# Patient Record
Sex: MALE | Race: WHITE | Employment: OTHER | URBAN - METROPOLITAN AREA
[De-identification: names, ages, dates, MRNs, and addresses within clinical notes are randomized per-mention and may not be internally consistent; named-entity substitution may affect disease eponyms.]

---

## 2017-06-15 ENCOUNTER — HOSPITAL ENCOUNTER (OUTPATIENT)
Dept: LAB | Age: 72
Discharge: HOME | End: 2017-06-15

## 2017-06-15 PROCEDURE — 88305 TISSUE EXAM BY PATHOLOGIST: CPT | Performed by: INTERNAL MEDICINE

## 2017-09-07 ENCOUNTER — ANESTHESIA EVENT (OUTPATIENT)
Dept: ENDOSCOPY | Age: 72
End: 2017-09-07
Payer: MEDICARE

## 2017-09-07 RX ORDER — SODIUM CHLORIDE 0.9 % (FLUSH) 0.9 %
5-10 SYRINGE (ML) INJECTION AS NEEDED
Status: CANCELLED | OUTPATIENT
Start: 2017-09-07

## 2017-09-07 RX ORDER — OXYCODONE HYDROCHLORIDE 5 MG/1
10 TABLET ORAL
Status: CANCELLED | OUTPATIENT
Start: 2017-09-07

## 2017-09-07 RX ORDER — DIPHENHYDRAMINE HYDROCHLORIDE 50 MG/ML
12.5 INJECTION, SOLUTION INTRAMUSCULAR; INTRAVENOUS ONCE
Status: CANCELLED | OUTPATIENT
Start: 2017-09-07 | End: 2017-09-07

## 2017-09-07 RX ORDER — ACETAMINOPHEN 500 MG
500 TABLET ORAL ONCE
Status: CANCELLED | OUTPATIENT
Start: 2017-09-07 | End: 2017-09-07

## 2017-09-07 RX ORDER — SODIUM CHLORIDE, SODIUM LACTATE, POTASSIUM CHLORIDE, CALCIUM CHLORIDE 600; 310; 30; 20 MG/100ML; MG/100ML; MG/100ML; MG/100ML
75 INJECTION, SOLUTION INTRAVENOUS CONTINUOUS
Status: CANCELLED | OUTPATIENT
Start: 2017-09-07

## 2017-09-07 RX ORDER — NALOXONE HYDROCHLORIDE 0.4 MG/ML
0.1 INJECTION, SOLUTION INTRAMUSCULAR; INTRAVENOUS; SUBCUTANEOUS AS NEEDED
Status: CANCELLED | OUTPATIENT
Start: 2017-09-07 | End: 2017-09-07

## 2017-09-07 RX ORDER — OXYCODONE HYDROCHLORIDE 5 MG/1
5 TABLET ORAL
Status: CANCELLED | OUTPATIENT
Start: 2017-09-07

## 2017-09-07 RX ORDER — HYDROMORPHONE HYDROCHLORIDE 2 MG/ML
0.5 INJECTION, SOLUTION INTRAMUSCULAR; INTRAVENOUS; SUBCUTANEOUS
Status: CANCELLED | OUTPATIENT
Start: 2017-09-07

## 2017-09-07 RX ORDER — ONDANSETRON 2 MG/ML
4 INJECTION INTRAMUSCULAR; INTRAVENOUS ONCE
Status: CANCELLED | OUTPATIENT
Start: 2017-09-07 | End: 2017-09-07

## 2017-09-08 ENCOUNTER — HOSPITAL ENCOUNTER (OUTPATIENT)
Age: 72
Setting detail: OUTPATIENT SURGERY
Discharge: HOME | End: 2017-09-08
Attending: INTERNAL MEDICINE | Admitting: INTERNAL MEDICINE
Payer: MEDICARE

## 2017-09-08 ENCOUNTER — ANESTHESIA (OUTPATIENT)
Dept: ENDOSCOPY | Age: 72
End: 2017-09-08
Payer: MEDICARE

## 2017-09-08 VITALS
OXYGEN SATURATION: 98 % | WEIGHT: 242 LBS | RESPIRATION RATE: 16 BRPM | DIASTOLIC BLOOD PRESSURE: 79 MMHG | HEIGHT: 67 IN | BODY MASS INDEX: 37.98 KG/M2 | TEMPERATURE: 98.6 F | SYSTOLIC BLOOD PRESSURE: 165 MMHG | HEART RATE: 65 BPM

## 2017-09-08 LAB — GLUCOSE BLD STRIP.AUTO-MCNC: 112 MG/DL (ref 65–100)

## 2017-09-08 PROCEDURE — 74011250636 HC RX REV CODE- 250/636: Performed by: ANESTHESIOLOGY

## 2017-09-08 PROCEDURE — 77030011640 HC PAD GRND REM COVD -A: Performed by: INTERNAL MEDICINE

## 2017-09-08 PROCEDURE — 74011250636 HC RX REV CODE- 250/636

## 2017-09-08 PROCEDURE — 77030029929: Performed by: INTERNAL MEDICINE

## 2017-09-08 PROCEDURE — 82962 GLUCOSE BLOOD TEST: CPT

## 2017-09-08 PROCEDURE — 88305 TISSUE EXAM BY PATHOLOGIST: CPT | Performed by: INTERNAL MEDICINE

## 2017-09-08 PROCEDURE — 77030014179 HC APPL CLP RESOL BSC -C: Performed by: INTERNAL MEDICINE

## 2017-09-08 PROCEDURE — 74011000250 HC RX REV CODE- 250

## 2017-09-08 PROCEDURE — 76040000026: Performed by: INTERNAL MEDICINE

## 2017-09-08 PROCEDURE — 76060000032 HC ANESTHESIA 0.5 TO 1 HR: Performed by: INTERNAL MEDICINE

## 2017-09-08 PROCEDURE — 77030009426 HC FCPS BIOP ENDOSC BSC -B: Performed by: INTERNAL MEDICINE

## 2017-09-08 PROCEDURE — 77030013991 HC SNR POLYP ENDOSC BSC -A: Performed by: INTERNAL MEDICINE

## 2017-09-08 RX ORDER — LIDOCAINE HYDROCHLORIDE 20 MG/ML
INJECTION, SOLUTION EPIDURAL; INFILTRATION; INTRACAUDAL; PERINEURAL AS NEEDED
Status: DISCONTINUED | OUTPATIENT
Start: 2017-09-08 | End: 2017-09-08 | Stop reason: HOSPADM

## 2017-09-08 RX ORDER — SODIUM CHLORIDE, SODIUM LACTATE, POTASSIUM CHLORIDE, CALCIUM CHLORIDE 600; 310; 30; 20 MG/100ML; MG/100ML; MG/100ML; MG/100ML
1000 INJECTION, SOLUTION INTRAVENOUS CONTINUOUS
Status: DISCONTINUED | OUTPATIENT
Start: 2017-09-08 | End: 2017-09-08 | Stop reason: HOSPADM

## 2017-09-08 RX ORDER — SODIUM CHLORIDE 0.9 % (FLUSH) 0.9 %
5-10 SYRINGE (ML) INJECTION AS NEEDED
Status: DISCONTINUED | OUTPATIENT
Start: 2017-09-08 | End: 2017-09-08 | Stop reason: HOSPADM

## 2017-09-08 RX ORDER — MIDAZOLAM HYDROCHLORIDE 1 MG/ML
2 INJECTION, SOLUTION INTRAMUSCULAR; INTRAVENOUS
Status: DISCONTINUED | OUTPATIENT
Start: 2017-09-08 | End: 2017-09-08 | Stop reason: HOSPADM

## 2017-09-08 RX ORDER — PROPOFOL 10 MG/ML
INJECTION, EMULSION INTRAVENOUS AS NEEDED
Status: DISCONTINUED | OUTPATIENT
Start: 2017-09-08 | End: 2017-09-08 | Stop reason: HOSPADM

## 2017-09-08 RX ORDER — SODIUM CHLORIDE 0.9 % (FLUSH) 0.9 %
5-10 SYRINGE (ML) INJECTION EVERY 8 HOURS
Status: DISCONTINUED | OUTPATIENT
Start: 2017-09-08 | End: 2017-09-08 | Stop reason: HOSPADM

## 2017-09-08 RX ORDER — PROPOFOL 10 MG/ML
INJECTION, EMULSION INTRAVENOUS
Status: DISCONTINUED | OUTPATIENT
Start: 2017-09-08 | End: 2017-09-08 | Stop reason: HOSPADM

## 2017-09-08 RX ORDER — FENTANYL CITRATE 50 UG/ML
100 INJECTION, SOLUTION INTRAMUSCULAR; INTRAVENOUS AS NEEDED
Status: DISCONTINUED | OUTPATIENT
Start: 2017-09-08 | End: 2017-09-08 | Stop reason: HOSPADM

## 2017-09-08 RX ORDER — LIDOCAINE HYDROCHLORIDE 10 MG/ML
0.1 INJECTION INFILTRATION; PERINEURAL AS NEEDED
Status: DISCONTINUED | OUTPATIENT
Start: 2017-09-08 | End: 2017-09-08 | Stop reason: HOSPADM

## 2017-09-08 RX ORDER — DIPHENHYDRAMINE HCL 25 MG
25 CAPSULE ORAL
COMMUNITY
End: 2018-01-03

## 2017-09-08 RX ADMIN — PROPOFOL 30 MG: 10 INJECTION, EMULSION INTRAVENOUS at 08:35

## 2017-09-08 RX ADMIN — LIDOCAINE HYDROCHLORIDE 60 MG: 20 INJECTION, SOLUTION EPIDURAL; INFILTRATION; INTRACAUDAL; PERINEURAL at 08:35

## 2017-09-08 RX ADMIN — PROPOFOL 180 MCG/KG/MIN: 10 INJECTION, EMULSION INTRAVENOUS at 08:35

## 2017-09-08 RX ADMIN — SODIUM CHLORIDE, SODIUM LACTATE, POTASSIUM CHLORIDE, AND CALCIUM CHLORIDE 1000 ML: 600; 310; 30; 20 INJECTION, SOLUTION INTRAVENOUS at 08:02

## 2017-09-08 NOTE — ANESTHESIA PREPROCEDURE EVALUATION
Anesthetic History   No history of anesthetic complications            Review of Systems / Medical History  Patient summary reviewed and pertinent labs reviewed    Pulmonary  Within defined limits                 Neuro/Psych       CVA: no residual symptoms       Cardiovascular    Hypertension: well controlled          CAD and cardiac stents (2003)    Exercise tolerance: >4 METS  Comments: Denies cardiac problems but endorses ECKERT   GI/Hepatic/Renal     GERD: well controlled           Endo/Other    Diabetes         Other Findings   Comments: Dysphagia           Physical Exam    Airway  Mallampati: III  TM Distance: 4 - 6 cm  Neck ROM: normal range of motion   Mouth opening: Normal     Cardiovascular    Rhythm: regular  Rate: normal         Dental  No notable dental hx       Pulmonary  Breath sounds clear to auscultation               Abdominal  GI exam deferred       Other Findings            Anesthetic Plan    ASA: 3  Anesthesia type: total IV anesthesia          Induction: Intravenous  Anesthetic plan and risks discussed with: Patient

## 2017-09-08 NOTE — IP AVS SNAPSHOT
[employer] 
 
 
 [address] [facility] [address] [facility] 
[phone] Patient: Kadeem Daley MRN: [id number] :1945 You are allergic to the following Allergen Reactions Pcn (Penicillins) Rash Recent Documentation Height Weight BMI Smoking Status 1.702 m 109.8 kg 37.9 kg/m2 Former Smoker Emergency Contacts Name Discharge Info Relation Home Work Mobile Frantz Yi  Son [22]   [phone] About your hospitalization You were admitted on:  2017 You last received care in the:  [facility] You were discharged on:  2017 Unit phone number:  [phone] Why you were hospitalized Your primary diagnosis was:  Not on File Providers Seen During Your Hospitalizations Provider Role Specialty Primary office phone Lei Finn MD Attending Provider Gastroenterology [phone] Your Primary Care Physician (PCP) Primary Care Physician Office Phone Office Fax [facility] [phone] Follow-up Information None Current Discharge Medication List  
  
ASK your doctor about these medications Dose & Instructions Dispensing Information Comments Morning Noon Evening Bedtime  
 allopurinol 100 mg tablet Commonly known as:  Erin Manzano Your last dose was: Your next dose is:    
   
   
 Dose:  100 mg Take 1 Tab by mouth daily. Quantity:  90 Tab Refills:  0  
     
   
   
   
  
 aspirin delayed-release 81 mg tablet Your last dose was: Your next dose is: Take  by mouth daily. Refills:  0  
     
   
   
   
  
 BENADRYL 25 mg capsule Generic drug:  diphenhydrAMINE Your last dose was: Your next dose is:    
   
   
 Dose:  25 mg Take 25 mg by mouth every six (6) hours as needed. Refills:  0 Blood-Glucose Meter Misc Commonly known as:  [address] Your last dose was: Your next dose is:    
   
   
 Ck fbs q day adn  A 2 hr prn bs after lunch or supper  Indications: sample given Quantity:  1 Each Refills:  0  
     
   
   
   
  
 colchicine 0.6 mg tablet Commonly known as:  COLCRYS Your last dose was: Your next dose is:    
   
   
 Dose:  0.6 mg Take 1 Tab by mouth daily. Indications: GOUT PREVENTION Quantity:  30 Tab Refills:  1  
     
   
   
   
  
 glucose blood VI test strips strip Commonly known as:  Fredderick Cover Your last dose was: Your next dose is:    
   
   
 Check fbs and  2 hr pc bs q  Day Quantity:  100 Strip Refills:  5  
     
   
   
   
  
 hydroCHLOROthiazide 25 mg tablet Commonly known as:  HYDRODIURIL Your last dose was: Your next dose is: Take 1 Tab by mouth daily. Quantity:  90 Tab Refills:  1  
     
   
   
   
  
 ibuprofen 200 mg tablet Commonly known as:  MOTRIN Your last dose was: Your next dose is:    
   
   
 Dose:  800 mg Take 800 mg by mouth nightly. Refills:  0  
     
   
   
   
  
 lancets 30 gauge Misc Your last dose was: Your next dose is:    
   
   
 Ck fbs and  2 hr pc bs q day and log it down daily Quantity:  100 Package Refills:  5  
     
   
   
   
  
 metFORMIN 500 mg tablet Commonly known as:  GLUCOPHAGE Your last dose was: Your next dose is:    
   
   
 Dose:  500 mg Take 1 Tab by mouth two (2) times daily (with meals). Quantity:  180 Tab Refills:  1  
     
   
   
   
  
 metoprolol succinate 100 mg tablet Commonly known as:  TOPROL-XL Your last dose was: Your next dose is: Take 1 Tab by mouth daily. Quantity:  90 Tab Refills:  1  
     
   
   
   
  
 nitroglycerin 0.4 mg SL tablet Commonly known as:  NITROSTAT Your last dose was: Your next dose is:    
   
   
 Dose:  0.4 mg  
1 Tab by SubLINGual route every five (5) minutes as needed for Chest Pain. Quantity:  1 Bottle Refills:  1  
     
   
   
   
  
 omeprazole 20 mg capsule Commonly known as:  PRILOSEC Your last dose was: Your next dose is: Take 1 Cap by mouth two (2) times a day. Quantity:  180 Cap Refills:  1  
     
   
   
   
  
 predniSONE 20 mg tablet Commonly known as:  Darene Reels Your last dose was: Your next dose is:    
   
   
 2 tabs today, then 1 tablet daily x 1 week Quantity:  9 Tab Refills:  0  
     
   
   
   
  
 simvastatin 40 mg tablet Commonly known as:  ZOCOR Your last dose was: Your next dose is:    
   
   
 Dose:  40 mg Take 1 Tab by mouth daily. Quantity:  90 Tab Refills:  1  
     
   
   
   
  
 spironolactone 25 mg tablet Commonly known as:  ALDACTONE Your last dose was: Your next dose is: Take 1 Tab by mouth daily. Quantity:  90 Tab Refills:  1  
     
   
   
   
  
 zolpidem 10 mg tablet Commonly known as:  AMBIEN Your last dose was: Your next dose is: TAKE 1 TABLET NIGHTLY AS NEEDED FOR SLEEP. MAX 1 TAB DAILY. Quantity:  30 Tab Refills:  5 Discharge Instructions Gastrointestinal Colonoscopy/Flexible Sigmoidoscopy - Lower Exam Discharge Instructions 1. Call Dr. Raya Hoyt at [phone] for any problems or questions. 2. Contact the doctors office for follow up appointment as directed 3. Medication may cause drowsiness for several hours, therefore, do not drive or operate machinery for remainder of the day. 4. No alcohol today. 5. Ordinarily, you may resume regular diet and activity after exam unless otherwise specified by your physician.  
6. Because of air put into your colon during exam, you may experience some abdominal distension, relieved by the passage of gas, for several hours. 7. Contact your physician if you have any of the following: 
a. Excessive amount of bleeding  large amount when having a bowel movement. Occasional specks of blood with bowel movement would not be unusual. 
b. Severe abdominal pain 
c. Fever or Chills 8. Polyp Removal  follow these additional instructions 
a. Resume regular diet  
b. Take Metamucil  1 tablespoon in juice every morning for 3 days 
c. No Aspirin, Advil, Aleve, Nuprin, Ibuprofen, or medications that contain these drugs for 2 weeks. Any additional instructions:  
 
- Post polypectomy instructions - Colonoscopy in 9months Instructions given to Palmer Cool and other family members. Instructions given by:  Gillian Osborne RN Discharge Orders None Introducing [facility] SERVICES! Janae Genao introduces Cree patient portal. Now you can access parts of your medical record, email your doctor's office, and request medication refills online. 1. In your internet browser, go to [email or web address] [employer]/[facility] 2. Click on the First Time User? Click Here link in the Sign In box. You will see the New Member Sign Up page. 3. Enter your Cree Access Code exactly as it appears below. You will not need to use this code after youve completed the sign-up process. If you do not sign up before the expiration date, you must request a new code. · Cree Access Code: [email or web address] Expires: 12/7/2017  7:04 AM 
 
4. Enter the last four digits of your Social Security Number (xxxx) and Date of Birth (mm/dd/yyyy) as indicated and click Submit. You will be taken to the next sign-up page. 5. Create a [employer] ID. This will be your Cree login ID and cannot be changed, so think of one that is secure and easy to remember. 6. Create a [employer] password. You can change your password at any time. 7. Enter your Password Reset Question and Answer. This can be used at a later time if you forget your password. 8. Enter your e-mail address. You will receive e-mail notification when new information is available in [address] 9. Click Sign Up. You can now view and download portions of your medical record. 10. Click the Download Summary menu link to download a portable copy of your medical information. If you have questions, please visit the Frequently Asked Questions section of the [employer] website. Remember, [employer] is NOT to be used for urgent needs. For medical emergencies, dial 911. Now available from your iPhone and Android! General Information Please provide this summary of care documentation to your next provider. Patient Signature:  ____________________________________________________________ Date:  ____________________________________________________________  
  
Marifer Gould Provider Signature:  ____________________________________________________________ Date:  ____________________________________________________________

## 2017-09-08 NOTE — ANESTHESIA POSTPROCEDURE EVALUATION
Post-Anesthesia Evaluation and Assessment    Patient: Kinza Zamudio MRN: [id number]  SSN: [id number]    YOB: 1945  Age: 73 y.o. Sex: male       Cardiovascular Function/Vital Signs  Visit Vitals    /85    Pulse 63    Temp 37 °C (98.6 °F)    Resp 16    Ht 5' 7\" (1.702 m)    Wt 109.8 kg (242 lb)    SpO2 98%    BMI 37.9 kg/m2       Patient is status post total IV anesthesia anesthesia for Procedure(s):  COLONOSCOPY WITH EMR/ 34  INJECTION  ENDOSCOPIC MUCOSAL RESECTION  ENDOSCOPIC POLYPECTOMY  RESOLUTION CLIP. Nausea/Vomiting: None    Postoperative hydration reviewed and adequate. Pain:  Pain Scale 1: Numeric (0 - 10) (09/08/17 0933)  Pain Intensity 1: 0 (09/08/17 0933)   Managed    Neurological Status: At baseline    Mental Status and Level of Consciousness: Arousable    Pulmonary Status:   O2 Device: Room air (09/08/17 0933)   Adequate oxygenation and airway patent    Complications related to anesthesia: None    Post-anesthesia assessment completed.  No concerns    Signed By: Jayde March MD     September 8, 2017

## 2017-09-08 NOTE — H&P
History and Physical      Patient: Goldie Skinner    Physician: Kun Lopes MD    Referring Physician: Felipa Aguirre MD    Chief Complaint: For colonoscopy    History of Present Illness: Pt presents for colonoscopy. Hx of HF polyp partially resected. History:  Past Medical History:   Diagnosis Date    Anxiety     r/t difficulty swallowing    CAD (coronary artery disease)     stent placed 2003    [facility]    type 2; avg blood sugar-100-120    Dysphasia     Esophageal dilatation     x 2 times, last in 2-12     GERD (gastroesophageal reflux disease)     controlled with medication    Hypercholesteremia     Hypertension     medication    Kidney stones     hx    Stroke ([facility]     2003-TIA    Unspecified adverse effect of anesthesia      Past Surgical History:   Procedure Laterality Date    HX OTHER SURGICAL      rectal polyps removed    HX OTHER SURGICAL      esophageal dilation with biopsy      Social History     Social History    Marital status:      Spouse name: N/A    Number of children: N/A    Years of education: N/A     Social History Main Topics    Smoking status: Former Smoker     Packs/day: 2.00     Years: 5.00     Types: Cigarettes     Quit date: 1/1/1980    Smokeless tobacco: Never Used    Alcohol use No    Drug use: No    Sexual activity: Not Currently     Other Topics Concern    None     Social History Narrative      Family History   Problem Relation Age of Onset    Heart Disease Mother     Heart Disease Father     Stroke Father     Cancer Father        Medications:   Prior to Admission medications    Medication Sig Start Date End Date Taking? Authorizing Provider   diphenhydrAMINE (BENADRYL) 25 mg capsule Take 25 mg by mouth every six (6) hours as needed. Yes Historical Provider   zolpidem (AMBIEN) 10 mg tablet TAKE 1 TABLET NIGHTLY AS NEEDED FOR SLEEP. MAX 1 TAB DAILY.  7/18/17  Yes Felipa Aguirre MD   metoprolol succinate (TOPROL-XL) 100 mg tablet Take 1 Tab by mouth daily. 7/18/17  Yes Chadd Barriga MD   spironolactone (ALDACTONE) 25 mg tablet Take 1 Tab by mouth daily. 7/18/17  Yes Chadd Barriga MD   hydroCHLOROthiazide (HYDRODIURIL) 25 mg tablet Take 1 Tab by mouth daily. 7/18/17  Yes Chadd Barriga MD   omeprazole (PRILOSEC) 20 mg capsule Take 1 Cap by mouth two (2) times a day. 7/18/17  Yes Chadd Barriga MD   allopurinol (ZYLOPRIM) 100 mg tablet Take 1 Tab by mouth daily. 4/28/17  Yes Leonidas Scott PA-C   colchicine (COLCRYS) 0.6 mg tablet Take 1 Tab by mouth daily. Indications: GOUT PREVENTION 4/28/17  Yes Leonidas Scott PA-C   aspirin delayed-release 81 mg tablet Take  by mouth daily. Yes Historical Provider   metFORMIN (GLUCOPHAGE) 500 mg tablet Take 1 Tab by mouth two (2) times daily (with meals). 11/16/16  Yes Chadd Barriga MD   simvastatin (ZOCOR) 40 mg tablet Take 1 Tab by mouth daily. 11/16/16  Yes Chadd Barriga MD   ibuprofen (MOTRIN) 200 mg tablet Take 800 mg by mouth nightly. Yes Historical Provider   glucose blood VI test strips (ONETOUCH VERIO) strip Check fbs and  2 hr pc bs q  Day 2/5/16  Yes Chadd Barriga MD   Blood-Glucose Meter (ONETOUCH VERIO SYSTEM) misc Ck fbs q day adn  A 2 hr prn bs after lunch or supper  Indications: sample given 2/5/16  Yes Chadd Barriga MD   lancets 30 gauge misc Ck fbs and  2 hr pc bs q day and log it down daily 2/5/16  Yes Chadd Barriga MD   predniSONE (DELTASONE) 20 mg tablet 2 tabs today, then 1 tablet daily x 1 week 4/26/17   Leonidas Scott PA-C   nitroglycerin (NITROSTAT) 0.4 mg SL tablet 1 Tab by SubLINGual route every five (5) minutes as needed for Chest Pain. 6/10/16   Chadd Barriga MD       Allergies:    Allergies   Allergen Reactions    Pcn [Penicillins] Rash       Physical Exam:     Vital Signs:   Visit Vitals    /77    Pulse 70    Temp 98.5 °F (36.9 °C)    Resp 18    Ht 5' 7\" (1.702 m)    Wt 109.8 kg (242 lb)    SpO2 96%    BMI 37.9 kg/m2     . General: no distress      Heart: regular   Lungs: unlabored   Abdominal: soft   Neurological: Grossly normal        Findings/Diagnosis: Colon polyp    Plan of Care/Planned Procedure: Colonoscopy, possible polypectomy and EMR. Pt/designee agree to proceed.       Signed:  Glen Zuñiga MD   9/8/2017

## 2017-09-08 NOTE — DISCHARGE INSTRUCTIONS
Gastrointestinal Colonoscopy/Flexible Sigmoidoscopy - Lower Exam Discharge Instructions  1. Call Dr. Jakob Mayberry at [phone] for any problems or questions. 2. Contact the doctors office for follow up appointment as directed  3. Medication may cause drowsiness for several hours, therefore, do not drive or operate machinery for remainder of the day. 4. No alcohol today. 5. Ordinarily, you may resume regular diet and activity after exam unless otherwise specified by your physician. 6. Because of air put into your colon during exam, you may experience some abdominal distension, relieved by the passage of gas, for several hours. 7. Contact your physician if you have any of the following:  a. Excessive amount of bleeding - large amount when having a bowel movement. Occasional specks of blood with bowel movement would not be unusual.  b. Severe abdominal pain  c. Fever or Chills  8. Polyp Removal - follow these additional instructions  a. Resume regular diet   b. Take Metamucil - 1 tablespoon in juice every morning for 3 days  c. No Aspirin, Advil, Aleve, Nuprin, Ibuprofen, or medications that contain these drugs for 2 weeks. Any additional instructions:     - Post polypectomy instructions  - Colonoscopy in 9months    Instructions given to Dudley Doctor and other family members.   Instructions given by:  Rafiq Ramon RN

## 2018-01-03 PROBLEM — F51.01 PRIMARY INSOMNIA: Chronic | Status: ACTIVE | Noted: 2018-01-03

## 2018-01-03 PROBLEM — K21.00 REFLUX ESOPHAGITIS: Chronic | Status: ACTIVE | Noted: 2018-01-03

## 2018-01-03 PROBLEM — I10 ESSENTIAL HYPERTENSION: Chronic | Status: ACTIVE | Noted: 2018-01-03

## 2018-01-03 PROBLEM — M1A.9XX0 CHRONIC GOUT INVOLVING TOE OF LEFT FOOT: Chronic | Status: ACTIVE | Noted: 2018-01-03

## 2019-05-15 PROBLEM — E78.2 MIXED HYPERLIPIDEMIA: Status: ACTIVE | Noted: 2019-05-15

## 2019-05-15 PROBLEM — I25.10 CORONARY ARTERY DISEASE INVOLVING NATIVE CORONARY ARTERY OF NATIVE HEART WITHOUT ANGINA PECTORIS: Status: ACTIVE | Noted: 2019-05-15

## 2019-05-15 PROBLEM — E78.5 HYPERLIPIDEMIA LDL GOAL <70: Status: ACTIVE | Noted: 2019-05-15

## 2019-05-15 PROBLEM — E79.0 HYPERURICEMIA: Status: ACTIVE | Noted: 2019-05-15

## 2019-05-15 PROBLEM — E11.40 CONTROLLED TYPE 2 DIABETES MELLITUS WITH DIABETIC NEUROPATHY, WITHOUT LONG-TERM CURRENT USE OF INSULIN (HCC): Status: ACTIVE | Noted: 2019-05-15

## 2019-06-05 PROBLEM — E04.2 MULTINODULAR GOITER: Status: ACTIVE | Noted: 2019-06-05

## 2019-06-05 PROBLEM — E05.90 HYPERTHYROIDISM: Status: ACTIVE | Noted: 2019-06-05

## 2019-06-05 PROBLEM — R13.19 ESOPHAGEAL DYSPHAGIA: Status: ACTIVE | Noted: 2019-06-05

## 2019-07-23 DIAGNOSIS — E05.90 HYPERTHYROIDISM: ICD-10-CM

## 2019-07-23 DIAGNOSIS — E05.20 TOXIC MULTINODULAR GOITER: Primary | ICD-10-CM

## 2019-07-24 ENCOUNTER — HOSPITAL ENCOUNTER (OUTPATIENT)
Dept: SURGERY | Age: 74
Discharge: HOME | End: 2019-07-24

## 2019-07-24 NOTE — PERIOP NOTES
Patient verified name and . Order for consent found in EHR and matches case posting; patient verifies procedure. total thyroidectomy    Type 2 surgery, PAT phone assessment complete. Orders received. Labs per surgeon: none  Labs per anesthesia protocol: Hgb, K+ and creatine- Pt instructed to come for lab work at entrance B (Monday- Friday 8:30 AM- 4:00 PM) prior to surgery day. Pt voiced an understanding. EKG: Done on 19 at [facility]. EKG tracing, Echo and stress test requested via fax to [facility] to be faxed to [phone]. Last cardiology office note dated 19 placed on chart for anesthesia reference. Last endocrinology office note dated 19 found in EHR if needed for anesthesia reference. Patient answered medical/surgical history questions at their best of ability. All prior to admission medications documented in [facility]. Patient instructed to take the following medications the day of surgery according to anesthesia guidelines with a small sip of water: allopurinol, aspirin, methimazole, metoprolol and prilosec if needed. Hold all vitamins 7 days prior to surgery and NSAIDS 5 days prior to surgery. Prescription meds to hold: ibuprofen. Patient instructed on the following:  Arrive at A Entrance, time of arrival to be called the day before by 1700  NPO after midnight including gum, mints, and ice chips  Responsible adult must drive patient to the hospital, stay during surgery, and patient will need supervision 24 hours after anesthesia  Use antibacterial soap in shower the night before surgery and on the morning of surgery  All piercings must be removed prior to arrival.    Leave all valuables (money and jewelry) at home but bring insurance card and ID on DOS. Do not wear make-up, nail polish, lotions, cologne, perfumes, powders, or oil on skin. Patient teach back successful and patient demonstrates knowledge of instruction.

## 2019-07-25 ENCOUNTER — HOSPITAL ENCOUNTER (OUTPATIENT)
Dept: LAB | Age: 74
Discharge: HOME | End: 2019-07-25
Attending: OTOLARYNGOLOGY
Payer: MEDICARE

## 2019-07-25 LAB
CREAT SERPL-MCNC: 1 MG/DL (ref 0.8–1.5)
HGB BLD-MCNC: 14.8 G/DL (ref 13.6–17.2)
POTASSIUM SERPL-SCNC: 4 MMOL/L (ref 3.5–5.1)

## 2019-07-25 PROCEDURE — 36415 COLL VENOUS BLD VENIPUNCTURE: CPT

## 2019-07-25 PROCEDURE — 82565 ASSAY OF CREATININE: CPT

## 2019-07-25 PROCEDURE — 85018 HEMOGLOBIN: CPT

## 2019-07-25 PROCEDURE — 84132 ASSAY OF SERUM POTASSIUM: CPT

## 2019-07-25 NOTE — PERIOP NOTES
Received faxed EKG (5/13/19) and echo (11/26/18) from [facility]. Placed on chart for reference.

## 2019-07-25 NOTE — PERIOP NOTES
Lab results Hgb, K+ and Creatinine within anesthesia guidelines.     Recent Results (from the past 12 hour(s))   CREATININE    Collection Time: 07/25/19  3:00 PM   Result Value Ref Range    Creatinine 1.00 0.8 - 1.5 MG/DL   HEMOGLOBIN    Collection Time: 07/25/19  3:00 PM   Result Value Ref Range    HGB 14.8 13.6 - 17.2 g/dL   POTASSIUM    Collection Time: 07/25/19  3:00 PM   Result Value Ref Range    Potassium 4.0 3.5 - 5.1 mmol/L

## 2019-07-29 ENCOUNTER — ANESTHESIA EVENT (OUTPATIENT)
Dept: SURGERY | Age: 74
End: 2019-07-29
Payer: MEDICARE

## 2019-07-29 RX ORDER — FENTANYL CITRATE 50 UG/ML
100 INJECTION, SOLUTION INTRAMUSCULAR; INTRAVENOUS ONCE
Status: CANCELLED | OUTPATIENT
Start: 2019-07-29 | End: 2019-07-29

## 2019-07-30 ENCOUNTER — HOSPITAL ENCOUNTER (OUTPATIENT)
Age: 74
Setting detail: OBSERVATION
Discharge: HOME | End: 2019-07-31
Attending: OTOLARYNGOLOGY | Admitting: OTOLARYNGOLOGY
Payer: MEDICARE

## 2019-07-30 ENCOUNTER — ANESTHESIA (OUTPATIENT)
Dept: SURGERY | Age: 74
End: 2019-07-30
Payer: MEDICARE

## 2019-07-30 DIAGNOSIS — E05.90 HYPERTHYROIDISM: ICD-10-CM

## 2019-07-30 DIAGNOSIS — E05.20 TOXIC MULTINODULAR GOITER: ICD-10-CM

## 2019-07-30 LAB
CALCIUM SERPL-MCNC: 9 MG/DL (ref 8.3–10.4)
GLUCOSE BLD STRIP.AUTO-MCNC: 109 MG/DL (ref 65–100)

## 2019-07-30 PROCEDURE — 77030021678 HC GLIDESCP STAT DISP VERT -B: Performed by: ANESTHESIOLOGY

## 2019-07-30 PROCEDURE — 77030040356 HC CORD BPLR FRCP COVD -A: Performed by: OTOLARYNGOLOGY

## 2019-07-30 PROCEDURE — 99218 HC RM OBSERVATION: CPT

## 2019-07-30 PROCEDURE — 77030019908 HC STETH ESOPH SIMS -A: Performed by: ANESTHESIOLOGY

## 2019-07-30 PROCEDURE — 77030031139 HC SUT VCRL2 J&J -A: Performed by: OTOLARYNGOLOGY

## 2019-07-30 PROCEDURE — 77030016570 HC BLNKT BAIR HGGR 3M -B: Performed by: ANESTHESIOLOGY

## 2019-07-30 PROCEDURE — 76010000175 HC OR TIME 4 TO 4.5 HR INTENSV-TIER 1: Performed by: OTOLARYNGOLOGY

## 2019-07-30 PROCEDURE — 74011000250 HC RX REV CODE- 250

## 2019-07-30 PROCEDURE — 74011250636 HC RX REV CODE- 250/636: Performed by: ANESTHESIOLOGY

## 2019-07-30 PROCEDURE — 77030020255 HC SOL INJ LR 1000ML BG

## 2019-07-30 PROCEDURE — 77030002933 HC SUT MCRYL J&J -A: Performed by: OTOLARYNGOLOGY

## 2019-07-30 PROCEDURE — 77030032988 HC TU ET NIM TRIVNTG EMG MEDT -D: Performed by: OTOLARYNGOLOGY

## 2019-07-30 PROCEDURE — 77030012406 HC DRN WND PENRS BARD -A: Performed by: OTOLARYNGOLOGY

## 2019-07-30 PROCEDURE — 76210000006 HC OR PH I REC 0.5 TO 1 HR: Performed by: OTOLARYNGOLOGY

## 2019-07-30 PROCEDURE — 74011250636 HC RX REV CODE- 250/636

## 2019-07-30 PROCEDURE — 77010033678 HC OXYGEN DAILY

## 2019-07-30 PROCEDURE — 77030011267 HC ELECTRD BLD COVD -A: Performed by: OTOLARYNGOLOGY

## 2019-07-30 PROCEDURE — 77030002996 HC SUT SLK J&J -A: Performed by: OTOLARYNGOLOGY

## 2019-07-30 PROCEDURE — 77030018836 HC SOL IRR NACL ICUM -A: Performed by: OTOLARYNGOLOGY

## 2019-07-30 PROCEDURE — 94760 N-INVAS EAR/PLS OXIMETRY 1: CPT

## 2019-07-30 PROCEDURE — 74011250636 HC RX REV CODE- 250/636: Performed by: OTOLARYNGOLOGY

## 2019-07-30 PROCEDURE — 77030019655 HC PRB STIM CRAN MEDT -B: Performed by: OTOLARYNGOLOGY

## 2019-07-30 PROCEDURE — 77030010512 HC APPL CLP LIG J&J -C: Performed by: OTOLARYNGOLOGY

## 2019-07-30 PROCEDURE — 77030032490 HC SLV COMPR SCD KNE COVD -B: Performed by: OTOLARYNGOLOGY

## 2019-07-30 PROCEDURE — 76060000039 HC ANESTHESIA 4 TO 4.5 HR: Performed by: OTOLARYNGOLOGY

## 2019-07-30 PROCEDURE — 82310 ASSAY OF CALCIUM: CPT

## 2019-07-30 PROCEDURE — 88307 TISSUE EXAM BY PATHOLOGIST: CPT

## 2019-07-30 PROCEDURE — 82962 GLUCOSE BLOOD TEST: CPT

## 2019-07-30 PROCEDURE — 74011000250 HC RX REV CODE- 250: Performed by: OTOLARYNGOLOGY

## 2019-07-30 PROCEDURE — 36415 COLL VENOUS BLD VENIPUNCTURE: CPT

## 2019-07-30 PROCEDURE — 74011250637 HC RX REV CODE- 250/637: Performed by: ANESTHESIOLOGY

## 2019-07-30 PROCEDURE — 74011250637 HC RX REV CODE- 250/637: Performed by: OTOLARYNGOLOGY

## 2019-07-30 RX ORDER — HYDROMORPHONE HYDROCHLORIDE 2 MG/ML
INJECTION, SOLUTION INTRAMUSCULAR; INTRAVENOUS; SUBCUTANEOUS AS NEEDED
Status: DISCONTINUED | OUTPATIENT
Start: 2019-07-30 | End: 2019-07-30 | Stop reason: HOSPADM

## 2019-07-30 RX ORDER — EPINEPHRINE 1 MG/ML
INJECTION INTRAMUSCULAR; INTRAVENOUS; SUBCUTANEOUS AS NEEDED
Status: DISCONTINUED | OUTPATIENT
Start: 2019-07-30 | End: 2019-07-30 | Stop reason: HOSPADM

## 2019-07-30 RX ORDER — NALOXONE HYDROCHLORIDE 0.4 MG/ML
0.4 INJECTION, SOLUTION INTRAMUSCULAR; INTRAVENOUS; SUBCUTANEOUS AS NEEDED
Status: DISCONTINUED | OUTPATIENT
Start: 2019-07-30 | End: 2019-07-31 | Stop reason: HOSPADM

## 2019-07-30 RX ORDER — EPHEDRINE SULFATE 50 MG/ML
INJECTION, SOLUTION INTRAVENOUS AS NEEDED
Status: DISCONTINUED | OUTPATIENT
Start: 2019-07-30 | End: 2019-07-30 | Stop reason: HOSPADM

## 2019-07-30 RX ORDER — ONDANSETRON 2 MG/ML
4 INJECTION INTRAMUSCULAR; INTRAVENOUS
Status: DISCONTINUED | OUTPATIENT
Start: 2019-07-30 | End: 2019-07-31 | Stop reason: HOSPADM

## 2019-07-30 RX ORDER — MIDAZOLAM HYDROCHLORIDE 1 MG/ML
2 INJECTION, SOLUTION INTRAMUSCULAR; INTRAVENOUS
Status: DISCONTINUED | OUTPATIENT
Start: 2019-07-30 | End: 2019-07-30 | Stop reason: HOSPADM

## 2019-07-30 RX ORDER — METFORMIN HYDROCHLORIDE 500 MG/1
500 TABLET ORAL 2 TIMES DAILY WITH MEALS
Status: DISCONTINUED | OUTPATIENT
Start: 2019-07-31 | End: 2019-07-31 | Stop reason: HOSPADM

## 2019-07-30 RX ORDER — PROPOFOL 10 MG/ML
INJECTION, EMULSION INTRAVENOUS AS NEEDED
Status: DISCONTINUED | OUTPATIENT
Start: 2019-07-30 | End: 2019-07-30 | Stop reason: HOSPADM

## 2019-07-30 RX ORDER — HEPARIN SODIUM 5000 [USP'U]/ML
5000 INJECTION, SOLUTION INTRAVENOUS; SUBCUTANEOUS EVERY 8 HOURS
Status: DISCONTINUED | OUTPATIENT
Start: 2019-07-31 | End: 2019-07-31 | Stop reason: HOSPADM

## 2019-07-30 RX ORDER — SODIUM CHLORIDE 0.9 % (FLUSH) 0.9 %
5-40 SYRINGE (ML) INJECTION AS NEEDED
Status: DISCONTINUED | OUTPATIENT
Start: 2019-07-30 | End: 2019-07-31 | Stop reason: HOSPADM

## 2019-07-30 RX ORDER — ACETAMINOPHEN 500 MG
1000 TABLET ORAL
Status: DISCONTINUED | OUTPATIENT
Start: 2019-07-30 | End: 2019-07-30 | Stop reason: HOSPADM

## 2019-07-30 RX ORDER — FAMOTIDINE 20 MG/1
20 TABLET, FILM COATED ORAL ONCE
Status: COMPLETED | OUTPATIENT
Start: 2019-07-30 | End: 2019-07-30

## 2019-07-30 RX ORDER — MORPHINE SULFATE 2 MG/ML
2 INJECTION, SOLUTION INTRAMUSCULAR; INTRAVENOUS
Status: DISCONTINUED | OUTPATIENT
Start: 2019-07-30 | End: 2019-07-31 | Stop reason: HOSPADM

## 2019-07-30 RX ORDER — LIDOCAINE HYDROCHLORIDE 10 MG/ML
0.1 INJECTION INFILTRATION; PERINEURAL AS NEEDED
Status: DISCONTINUED | OUTPATIENT
Start: 2019-07-30 | End: 2019-07-30 | Stop reason: HOSPADM

## 2019-07-30 RX ORDER — DEXAMETHASONE SODIUM PHOSPHATE 4 MG/ML
INJECTION, SOLUTION INTRA-ARTICULAR; INTRALESIONAL; INTRAMUSCULAR; INTRAVENOUS; SOFT TISSUE AS NEEDED
Status: DISCONTINUED | OUTPATIENT
Start: 2019-07-30 | End: 2019-07-30 | Stop reason: HOSPADM

## 2019-07-30 RX ORDER — SODIUM CHLORIDE, SODIUM LACTATE, POTASSIUM CHLORIDE, CALCIUM CHLORIDE 600; 310; 30; 20 MG/100ML; MG/100ML; MG/100ML; MG/100ML
150 INJECTION, SOLUTION INTRAVENOUS CONTINUOUS
Status: DISCONTINUED | OUTPATIENT
Start: 2019-07-30 | End: 2019-07-30 | Stop reason: HOSPADM

## 2019-07-30 RX ORDER — SUCCINYLCHOLINE CHLORIDE 20 MG/ML
INJECTION INTRAMUSCULAR; INTRAVENOUS AS NEEDED
Status: DISCONTINUED | OUTPATIENT
Start: 2019-07-30 | End: 2019-07-30 | Stop reason: HOSPADM

## 2019-07-30 RX ORDER — DOCUSATE SODIUM 100 MG/1
100 CAPSULE, LIQUID FILLED ORAL
Status: DISCONTINUED | OUTPATIENT
Start: 2019-07-30 | End: 2019-07-31 | Stop reason: HOSPADM

## 2019-07-30 RX ORDER — DIPHENHYDRAMINE HCL 25 MG
25 CAPSULE ORAL
Status: DISCONTINUED | OUTPATIENT
Start: 2019-07-30 | End: 2019-07-31 | Stop reason: HOSPADM

## 2019-07-30 RX ORDER — EPHEDRINE SULFATE 50 MG/ML
INJECTION, SOLUTION INTRAVENOUS AS NEEDED
Status: DISCONTINUED | OUTPATIENT
Start: 2019-07-30 | End: 2019-07-30

## 2019-07-30 RX ORDER — HYDROCODONE BITARTRATE AND ACETAMINOPHEN 5; 325 MG/1; MG/1
1 TABLET ORAL AS NEEDED
Status: DISCONTINUED | OUTPATIENT
Start: 2019-07-30 | End: 2019-07-30 | Stop reason: HOSPADM

## 2019-07-30 RX ORDER — PANTOPRAZOLE SODIUM 40 MG/1
40 TABLET, DELAYED RELEASE ORAL
Status: DISCONTINUED | OUTPATIENT
Start: 2019-07-31 | End: 2019-07-31 | Stop reason: HOSPADM

## 2019-07-30 RX ORDER — ASPIRIN 81 MG/1
81 TABLET ORAL DAILY
Status: DISCONTINUED | OUTPATIENT
Start: 2019-07-31 | End: 2019-07-31 | Stop reason: HOSPADM

## 2019-07-30 RX ORDER — ROSUVASTATIN CALCIUM 20 MG/1
20 TABLET, COATED ORAL
Status: DISCONTINUED | OUTPATIENT
Start: 2019-07-30 | End: 2019-07-31 | Stop reason: HOSPADM

## 2019-07-30 RX ORDER — SODIUM CHLORIDE 9 MG/ML
50 INJECTION, SOLUTION INTRAVENOUS CONTINUOUS
Status: DISCONTINUED | OUTPATIENT
Start: 2019-07-30 | End: 2019-07-30 | Stop reason: HOSPADM

## 2019-07-30 RX ORDER — FERROUS SULFATE, DRIED 160(50) MG
2 TABLET, EXTENDED RELEASE ORAL
Status: DISCONTINUED | OUTPATIENT
Start: 2019-07-30 | End: 2019-07-31 | Stop reason: HOSPADM

## 2019-07-30 RX ORDER — LIDOCAINE HYDROCHLORIDE AND EPINEPHRINE 10; 10 MG/ML; UG/ML
INJECTION, SOLUTION INFILTRATION; PERINEURAL AS NEEDED
Status: DISCONTINUED | OUTPATIENT
Start: 2019-07-30 | End: 2019-07-30 | Stop reason: HOSPADM

## 2019-07-30 RX ORDER — EPLERENONE 50 MG/1
50 TABLET, FILM COATED ORAL DAILY
Status: DISCONTINUED | OUTPATIENT
Start: 2019-07-31 | End: 2019-07-31 | Stop reason: HOSPADM

## 2019-07-30 RX ORDER — LISINOPRIL 20 MG/1
20 TABLET ORAL DAILY
Status: DISCONTINUED | OUTPATIENT
Start: 2019-07-31 | End: 2019-07-31 | Stop reason: HOSPADM

## 2019-07-30 RX ORDER — ALLOPURINOL 300 MG/1
300 TABLET ORAL DAILY
Status: DISCONTINUED | OUTPATIENT
Start: 2019-07-31 | End: 2019-07-31 | Stop reason: HOSPADM

## 2019-07-30 RX ORDER — HYDROCODONE BITARTRATE AND ACETAMINOPHEN 5; 325 MG/1; MG/1
1 TABLET ORAL
Status: DISCONTINUED | OUTPATIENT
Start: 2019-07-30 | End: 2019-07-31 | Stop reason: HOSPADM

## 2019-07-30 RX ORDER — NITROGLYCERIN 0.4 MG/1
0.4 TABLET SUBLINGUAL
Status: DISCONTINUED | OUTPATIENT
Start: 2019-07-30 | End: 2019-07-31 | Stop reason: HOSPADM

## 2019-07-30 RX ORDER — SODIUM CHLORIDE 0.9 % (FLUSH) 0.9 %
5-40 SYRINGE (ML) INJECTION EVERY 8 HOURS
Status: DISCONTINUED | OUTPATIENT
Start: 2019-07-30 | End: 2019-07-31 | Stop reason: HOSPADM

## 2019-07-30 RX ORDER — LEVOTHYROXINE SODIUM 75 UG/1
150 TABLET ORAL
Status: DISCONTINUED | OUTPATIENT
Start: 2019-07-31 | End: 2019-07-31 | Stop reason: HOSPADM

## 2019-07-30 RX ORDER — ROCURONIUM BROMIDE 10 MG/ML
INJECTION, SOLUTION INTRAVENOUS AS NEEDED
Status: DISCONTINUED | OUTPATIENT
Start: 2019-07-30 | End: 2019-07-30 | Stop reason: HOSPADM

## 2019-07-30 RX ORDER — HYDROMORPHONE HYDROCHLORIDE 2 MG/ML
0.5 INJECTION, SOLUTION INTRAMUSCULAR; INTRAVENOUS; SUBCUTANEOUS
Status: DISCONTINUED | OUTPATIENT
Start: 2019-07-30 | End: 2019-07-30 | Stop reason: HOSPADM

## 2019-07-30 RX ORDER — SODIUM CHLORIDE, SODIUM LACTATE, POTASSIUM CHLORIDE, CALCIUM CHLORIDE 600; 310; 30; 20 MG/100ML; MG/100ML; MG/100ML; MG/100ML
100 INJECTION, SOLUTION INTRAVENOUS CONTINUOUS
Status: DISCONTINUED | OUTPATIENT
Start: 2019-07-30 | End: 2019-07-31 | Stop reason: HOSPADM

## 2019-07-30 RX ORDER — LIDOCAINE HYDROCHLORIDE 20 MG/ML
INJECTION, SOLUTION EPIDURAL; INFILTRATION; INTRACAUDAL; PERINEURAL AS NEEDED
Status: DISCONTINUED | OUTPATIENT
Start: 2019-07-30 | End: 2019-07-30 | Stop reason: HOSPADM

## 2019-07-30 RX ORDER — ONDANSETRON 2 MG/ML
INJECTION INTRAMUSCULAR; INTRAVENOUS AS NEEDED
Status: DISCONTINUED | OUTPATIENT
Start: 2019-07-30 | End: 2019-07-30 | Stop reason: HOSPADM

## 2019-07-30 RX ORDER — METOPROLOL SUCCINATE 100 MG/1
100 TABLET, EXTENDED RELEASE ORAL DAILY
Status: DISCONTINUED | OUTPATIENT
Start: 2019-07-31 | End: 2019-07-31 | Stop reason: HOSPADM

## 2019-07-30 RX ORDER — FENTANYL CITRATE 50 UG/ML
INJECTION, SOLUTION INTRAMUSCULAR; INTRAVENOUS AS NEEDED
Status: DISCONTINUED | OUTPATIENT
Start: 2019-07-30 | End: 2019-07-30 | Stop reason: HOSPADM

## 2019-07-30 RX ADMIN — SODIUM CHLORIDE, SODIUM LACTATE, POTASSIUM CHLORIDE, AND CALCIUM CHLORIDE: 600; 310; 30; 20 INJECTION, SOLUTION INTRAVENOUS at 14:44

## 2019-07-30 RX ADMIN — ROSUVASTATIN CALCIUM 20 MG: 20 TABLET, COATED ORAL at 21:45

## 2019-07-30 RX ADMIN — HYDROMORPHONE HYDROCHLORIDE 0.2 MG: 2 INJECTION, SOLUTION INTRAMUSCULAR; INTRAVENOUS; SUBCUTANEOUS at 15:30

## 2019-07-30 RX ADMIN — PROPOFOL 200 MG: 10 INJECTION, EMULSION INTRAVENOUS at 13:20

## 2019-07-30 RX ADMIN — EPHEDRINE SULFATE 10 MG: 50 INJECTION, SOLUTION INTRAVENOUS at 13:51

## 2019-07-30 RX ADMIN — SODIUM CHLORIDE, SODIUM LACTATE, POTASSIUM CHLORIDE, AND CALCIUM CHLORIDE 150 ML/HR: 600; 310; 30; 20 INJECTION, SOLUTION INTRAVENOUS at 12:46

## 2019-07-30 RX ADMIN — SODIUM CHLORIDE, SODIUM LACTATE, POTASSIUM CHLORIDE, AND CALCIUM CHLORIDE 100 ML/HR: 600; 310; 30; 20 INJECTION, SOLUTION INTRAVENOUS at 21:49

## 2019-07-30 RX ADMIN — FENTANYL CITRATE 50 MCG: 50 INJECTION, SOLUTION INTRAMUSCULAR; INTRAVENOUS at 13:59

## 2019-07-30 RX ADMIN — SODIUM CHLORIDE, SODIUM LACTATE, POTASSIUM CHLORIDE, AND CALCIUM CHLORIDE: 600; 310; 30; 20 INJECTION, SOLUTION INTRAVENOUS at 13:36

## 2019-07-30 RX ADMIN — PHENOL 1 SPRAY: 1.5 LIQUID ORAL at 21:43

## 2019-07-30 RX ADMIN — SUCCINYLCHOLINE CHLORIDE 180 MG: 20 INJECTION INTRAMUSCULAR; INTRAVENOUS at 13:20

## 2019-07-30 RX ADMIN — HYDROMORPHONE HYDROCHLORIDE 0.5 MG: 2 INJECTION INTRAMUSCULAR; INTRAVENOUS; SUBCUTANEOUS at 17:39

## 2019-07-30 RX ADMIN — EPHEDRINE SULFATE 10 MG: 50 INJECTION, SOLUTION INTRAVENOUS at 13:39

## 2019-07-30 RX ADMIN — HYDROMORPHONE HYDROCHLORIDE 0.2 MG: 2 INJECTION, SOLUTION INTRAMUSCULAR; INTRAVENOUS; SUBCUTANEOUS at 14:43

## 2019-07-30 RX ADMIN — HYDROMORPHONE HYDROCHLORIDE 0.2 MG: 2 INJECTION, SOLUTION INTRAMUSCULAR; INTRAVENOUS; SUBCUTANEOUS at 15:54

## 2019-07-30 RX ADMIN — Medication 10 ML: at 21:46

## 2019-07-30 RX ADMIN — FENTANYL CITRATE 100 MCG: 50 INJECTION, SOLUTION INTRAMUSCULAR; INTRAVENOUS at 13:20

## 2019-07-30 RX ADMIN — ROCURONIUM BROMIDE 5 MG: 10 INJECTION, SOLUTION INTRAVENOUS at 13:21

## 2019-07-30 RX ADMIN — ONDANSETRON 4 MG: 2 INJECTION INTRAMUSCULAR; INTRAVENOUS at 21:44

## 2019-07-30 RX ADMIN — FENTANYL CITRATE 50 MCG: 50 INJECTION, SOLUTION INTRAMUSCULAR; INTRAVENOUS at 13:45

## 2019-07-30 RX ADMIN — EPHEDRINE SULFATE 10 MG: 50 INJECTION, SOLUTION INTRAVENOUS at 13:30

## 2019-07-30 RX ADMIN — FAMOTIDINE 20 MG: 20 TABLET, FILM COATED ORAL at 12:46

## 2019-07-30 RX ADMIN — HYDROMORPHONE HYDROCHLORIDE 0.2 MG: 2 INJECTION, SOLUTION INTRAMUSCULAR; INTRAVENOUS; SUBCUTANEOUS at 15:10

## 2019-07-30 RX ADMIN — ONDANSETRON 4 MG: 2 INJECTION INTRAMUSCULAR; INTRAVENOUS at 13:20

## 2019-07-30 RX ADMIN — CALCIUM CARBONATE 500 MG (1,250 MG)-VITAMIN D3 200 UNIT TABLET 2 TABLET: at 21:44

## 2019-07-30 RX ADMIN — LIDOCAINE HYDROCHLORIDE 0.1 ML: 10 INJECTION, SOLUTION INFILTRATION; PERINEURAL at 12:46

## 2019-07-30 RX ADMIN — HYDROMORPHONE HYDROCHLORIDE 0.2 MG: 2 INJECTION, SOLUTION INTRAMUSCULAR; INTRAVENOUS; SUBCUTANEOUS at 16:44

## 2019-07-30 RX ADMIN — HEPARIN SODIUM 5000 UNITS: 5000 INJECTION INTRAVENOUS; SUBCUTANEOUS at 23:52

## 2019-07-30 RX ADMIN — LIDOCAINE HYDROCHLORIDE 80 MG: 20 INJECTION, SOLUTION EPIDURAL; INFILTRATION; INTRACAUDAL; PERINEURAL at 13:20

## 2019-07-30 RX ADMIN — DEXAMETHASONE SODIUM PHOSPHATE 10 MG: 4 INJECTION, SOLUTION INTRA-ARTICULAR; INTRALESIONAL; INTRAMUSCULAR; INTRAVENOUS; SOFT TISSUE at 13:20

## 2019-07-30 RX ADMIN — HYDROMORPHONE HYDROCHLORIDE 0.5 MG: 2 INJECTION INTRAMUSCULAR; INTRAVENOUS; SUBCUTANEOUS at 17:44

## 2019-07-30 NOTE — BRIEF OP NOTE
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]      Procedure(s):  TOTAL THYROIDECTOMY / NIMS    Surgeon(s) and Role:     * Alice Smalls MD - Primary        Surgical Staff:  Circ-1: Gena Gotti RN  Circ-Relief: Carrington Milan RN  Scrub Tech-1: Lindsey Dumont  Scrub Tech-2: Rose Amezquita; Nabila Pacheco  Scrub Tech-Relief: Gonzalo Spring    Event Time In Time Out   Incision Start 1332    Incision Close       Anesthesia: General     IVF: 1800 cc    Estimated Blood Loss: 40 cc    Specimens:   ID Type Source Tests Collected by Time Destination   1 : total thyroidectomy [place]  Alice Smalls MD 7/30/2019 1617 Pathology      Findings: 8 cm L thyroid nodule    Complications: none    Implants: * No implants in log *

## 2019-07-30 NOTE — ANESTHESIA POSTPROCEDURE EVALUATION
Procedure(s):  TOTAL THYROIDECTOMY / NIMS.     general    Anesthesia Post Evaluation      Multimodal analgesia: multimodal analgesia used between 6 hours prior to anesthesia start to PACU discharge  Patient location during evaluation: PACU  Patient participation: complete - patient participated  Level of consciousness: responsive to verbal stimuli  Pain management: adequate  Airway patency: patent  Anesthetic complications: no  Cardiovascular status: acceptable  Respiratory status: acceptable, spontaneous ventilation and nonlabored ventilation  Hydration status: acceptable  Post anesthesia nausea and vomiting:  none      Vitals Value Taken Time   /74 7/30/2019  5:55 PM   Temp 36.2 °C (97.2 °F) 7/30/2019  5:25 PM   Pulse 93 7/30/2019  5:55 PM   Resp 18 7/30/2019  5:55 PM   SpO2 98 % 7/30/2019  5:55 PM

## 2019-07-30 NOTE — H&P
71 yo male who was dx recently w/ toxic MNG. He had lost considerable wt- almost 80 lbs- and had TSH checked and it was low. He has been on Tapazole and his most recent TSH was still too low. He c/o wt fluctuation and fatigue. There are no palpitations or excessive sweating. Denies any FH of thyroid cancer. His US revealed several nodules on L side and the dominant nodule was bx'd but the FNA was non-dx. He c/o some dysphagia to solid foods and pills and feels like it often takes a few swallows to get large pills to pass.      Past Medical History:   Diagnosis Date    Anxiety     r/t difficulty swallowing    Arthritis     CAD (coronary artery disease)     stent placed 2003, Folowed by Dr. Angelina Shea Chronic pain     Knees, hips, feet and neck     Diabetes ([facility] [address] 2003    type 2; oral meds, avg blood sugar-100-120, Last A1C 6.4 on 5/10/19, Hypoglycemic signs at 50    Dysphasia     Esophageal dilatation     x 2 times, last in 2-12     GERD (gastroesophageal reflux disease)     controlled with as needed medication    Gout     Hypercholesteremia     Daily meds     Hypertension     Managed with medication    Kidney stones     hx    Sleep apnea     Pt states history of and no longer uses c-pap machine     Stroke ([facility] [address]     2003-TIA- no deficits     Unspecified adverse effect of anesthesia      Past Surgical History:   Procedure Laterality Date    COLONOSCOPY N/A 9/8/2017    COLONOSCOPY/ 34 performed by Camryn Rowley MD at [facility]    HX COLONOSCOPY  2016    repeat in 2019    HX HEART CATHETERIZATION  2003    One stent     HX OTHER SURGICAL      rectal polyps removed    HX OTHER SURGICAL  2010    esophageal dilation with biopsy    HX WISDOM TEETH EXTRACTION      age 50     Social History     Socioeconomic History    Marital status:      Spouse name: Not on file    Number of children: Not on file    Years of education: Not on file    Highest education level: Not on file   Occupational History    Not on file   Social Needs    Financial resource strain: Not on file    Food insecurity:     Worry: Not on file     Inability: Not on file    Transportation needs:     Medical: Not on file     Non-medical: Not on file   Tobacco Use    Smoking status: Former Smoker     Packs/day: 2.00     Years: 5.00     Pack years: 10.00     Types: Cigarettes     Last attempt to quit: 1980     Years since quittin.6    Smokeless tobacco: Never Used   Substance and Sexual Activity    Alcohol use: No     Alcohol/week: 0.0 standard drinks    Drug use: No    Sexual activity: Not Currently   Lifestyle    Physical activity:     Days per week: Not on file     Minutes per session: Not on file    Stress: Not on file   Relationships    Social connections:     Talks on phone: Not on file     Gets together: Not on file     Attends Protestant service: Not on file     Active member of club or organization: Not on file     Attends meetings of clubs or organizations: Not on file     Relationship status: Not on file    Intimate partner violence:     Fear of current or ex partner: Not on file     Emotionally abused: Not on file     Physically abused: Not on file     Forced sexual activity: Not on file   Other Topics Concern    Not on file   Social History Narrative    Not on file     Family History   Problem Relation Age of Onset    Heart Disease Mother     Heart Disease Father     Stroke Father     Cancer Father         prostate cancer    No Known Problems Maternal Grandmother     No Known Problems Maternal Grandfather     No Known Problems Paternal Grandmother     No Known Problems Paternal Grandfather     Colon Cancer Brother     Prostate Cancer Brother     Heart Attack Paternal Uncle     Colon Cancer Brother     Thyroid Disease Daughter      Allergies   Allergen Reactions    Pcn [Penicillins] Rash     No current facility-administered medications on file prior to encounter.       Current Outpatient Medications on File Prior to Encounter   Medication Sig Dispense Refill    calcium-vitamin D (OYSTER SHELL) 500 mg(1,250mg) -200 unit per tablet Take 2 Tabs by mouth three (3) times daily (with meals). Start taking 1 week prior to surgery 100 Tab 0    eplerenone (INSPRA) 50 mg tab tablet Take 50 mg by mouth daily.  methIMAzole (TAPAZOLE) 5 mg tablet Take 1 Tab by mouth daily. 30 Tab 5    metFORMIN (GLUCOPHAGE) 500 mg tablet Take 1 Tab by mouth two (2) times daily (with meals). 180 Tab 1    benazepril (LOTENSIN) 20 mg tablet Take 1 Tab by mouth daily. 90 Tab 1    rosuvastatin (CRESTOR) 20 mg tablet Take 1 Tab by mouth nightly. 90 Tab 1    allopurinol (ZYLOPRIM) 300 mg tablet Take 1 Tab by mouth daily. Indications: treatment to prevent acute gout attack 90 Tab 1    metoprolol succinate (TOPROL-XL) 100 mg tablet Take 1 Tab by mouth daily. 90 Tab 1    aspirin delayed-release 81 mg tablet Take 1 Tab by mouth daily. 90 Tab 3    ibuprofen (MOTRIN) 200 mg tablet Take 800 mg by mouth nightly as needed.  levothyroxine (SYNTHROID) 150 mcg tablet Take 1 Tab by mouth Daily (before breakfast). Start after thyroid surgery (Patient not taking: Reported on 7/24/2019) 30 Tab 11    omeprazole (PRILOSEC) 20 mg capsule Take 1 Cap by mouth two (2) times a day. (Patient taking differently: Take 20 mg by mouth two (2) times daily as needed. Take 1 Cap by mouth two (2) times a day.) 180 Cap 1    colchicine (COLCRYS) 0.6 mg tablet Take 1 Tab by mouth daily. Indications: prevention of acute gout attack (Patient taking differently: Take 0.6 mg by mouth daily as needed. Indications: treatment to prevent acute gout attack) 30 Tab 0    nitroglycerin (NITROSTAT) 0.4 mg SL tablet 1 Tab by SubLINGual route every five (5) minutes as needed for Chest Pain.  1 Bottle 1    glucose blood VI test strips (ONETOUCH VERIO) strip Check fbs and  2 hr pc bs q  Day 100 Strip 5    Blood-Glucose Meter (ONETOUCH VERIO SYSTEM) misc Ck fbs q day adn  A 2 hr prn bs after lunch or supper  Indications: sample given 1 Each 0    lancets 30 gauge misc Ck fbs and  2 hr pc bs q day and log it down daily 100 Package 5     EXAM:  Visit Vitals  /89 (BP 1 Location: Right arm, BP Patient Position: At rest;Sitting)   Pulse 60   Temp 97.9 °F (36.6 °C)   Resp 16   Ht 5' 9\" (1.753 m)   Wt 215 lb 6.4 oz (97.7 kg)   SpO2 97%   BMI 31.81 kg/m²     General: NAD, well-appearing  Neuro: No gross neuro deficits. CN's II-XII intact. No facial weakness. Eyes: EOMI. Pupils reactive. No periorbital edema/ecchymosis. No nystagmus. Skin: No facial erythema, rashes or concerning lesions. Nose: No external deviations or saddling. Intranasally, septum is midline without perforations, nasal mucosa appears healthy with no erythema, mucopurulence, or polyps. Mouth: Moist mucus membranes, normal tongue/palate mobility, no concerning mucosal lesions. Oropharynx clear with no erythema/exudate, no tonsillar hypertrophy. Ears: Normal appearing auricles, no hematomas. EACs clear with no cerumen impaction, healthy canal skin, TM's intact with no perforations or retraction pockets. No middle ear effusions. Neck: Soft, supple, no palpable neck masses. There is L > R thyromegaly w/ dominant L sided nodule. No surgical scars. Lymphatics: No palpable cervical LAD. Resp: No audible stridor or wheezing.   Extremities: No clubbing or cyanosis.     IMAGING:  I reviewed his recent thyroid US-     Thyroid ultrasound 6/5/2019: Right lobe 1.72 x 1.94 x 4.40 cm.  In the superior right lobe there is a solid, isoechoic nodule measuring 1.16 x 1.27 x 1.42 cm containing peripheral blood flow and no obvious microcalcifications.  Isthmus measures 0.31 cm.  Left lobe 3.74 x 4.85 x 8.22 cm.  The left lobe is markedly enlarged and nodular.  There appears to be a complex, dominant nodule in the left lobe measuring approximately 3.37 x 4.83 x 5.49 cm containing no obvious microcalcifications.  The left lobe of the thyroid is causing displacement of the trachea towards the right.  Just medially there appears to be a more solid, isoechoic nodule measuring 2.15 x 2.68 x 3.31 cm.       Lab Results   Component Value Date/Time    TSH 0.383 (L) 05/10/2019 11:24 AM     A/P:  He has a toxic MNG w/ several large nodules on L side. I agree w/ Dr. Angie Peck recommendation for total thyroidectomy which would cure him of his hyperthyroidism, help w/ swallowing and ensure no underlying malignancy in his nodule. I discussed the risks of thyroid surgery including bleeding/hematoma, damage to recurrent laryngeal nerve w/ subsequent hoarseness or stridor, possible need for tracheostomy, hypocalcemia, need for lifetime thyroid supplementation, recurrence, and need for further procedures.      Juan J 1

## 2019-07-30 NOTE — PROGRESS NOTES
TRANSFER - IN REPORT:    Verbal report received from Rozina Xie RN on Adonna Band  being received from PACU for routine progression of care      Report consisted of patients Situation, Background, Assessment and   Recommendations(SBAR). Information from the following report(s) SBAR, Kardex, OR Summary and MAR was reviewed with the receiving nurse. Opportunity for questions and clarification was provided. Assessment completed upon patients arrival to unit and care assumed.

## 2019-07-30 NOTE — ANESTHESIA PREPROCEDURE EVALUATION
Anesthetic History   No history of anesthetic complications            Review of Systems / Medical History  Patient summary reviewed and pertinent labs reviewed    Pulmonary  Within defined limits                 Neuro/Psych       CVA: no residual symptoms       Cardiovascular    Hypertension: well controlled          CAD and cardiac stents (2003)    Exercise tolerance: >4 METS  Comments: Denies cardiac problems but endorses ECKERT   GI/Hepatic/Renal     GERD: well controlled           Endo/Other    Diabetes         Other Findings   Comments: Dysphagia       Anesthetic History   No history of anesthetic complications            Review of Systems / Medical History  Patient summary reviewed and pertinent labs reviewed    Pulmonary  Within defined limits                 Neuro/Psych       CVA: no residual symptoms       Cardiovascular    Hypertension: well controlled          CAD and cardiac stents (2003)    Exercise tolerance: >4 METS  Comments: Denies cardiac problems but endorses ECKERT   GI/Hepatic/Renal     GERD: well controlled           Endo/Other    Diabetes         Other Findings   Comments: Dysphagia           Physical Exam    Airway  Mallampati: III  TM Distance: 4 - 6 cm  Neck ROM: normal range of motion   Mouth opening: Normal     Cardiovascular    Rhythm: regular  Rate: normal         Dental  No notable dental hx       Pulmonary  Breath sounds clear to auscultation               Abdominal  GI exam deferred       Other Findings            Anesthetic Plan    ASA: 3  Anesthesia type: total IV anesthesia          Induction: Intravenous  Anesthetic plan and risks discussed with: Patient              Physical Exam    Airway  Mallampati: III  TM Distance: 4 - 6 cm  Neck ROM: normal range of motion   Mouth opening: Normal     Cardiovascular    Rhythm: regular  Rate: normal         Dental  No notable dental hx       Pulmonary  Breath sounds clear to auscultation               Abdominal  GI exam deferred       Other Findings            Anesthetic Plan    ASA: 3  Anesthesia type: general          Induction: Intravenous  Anesthetic plan and risks discussed with: Patient

## 2019-07-30 NOTE — PERIOP NOTES
TRANSFER - OUT REPORT:    Verbal report given Darcy WATKINS on Beatrice Chapman  being transferred to [facility] for routine progression of care       Report consisted of patients Situation, Background, Assessment and   Recommendations(SBAR). Information from the following report(s) SBAR was reviewed with the receiving nurse. Lines:   Peripheral IV 07/30/19 Posterior;Right Wrist (Active)   Site Assessment Clean, dry, & intact 7/30/2019  5:28 PM   Phlebitis Assessment 0 7/30/2019  5:28 PM   Infiltration Assessment 0 7/30/2019  5:28 PM   Dressing Status Clean, dry, & intact 7/30/2019  5:28 PM   Dressing Type Tape 7/30/2019  5:28 PM   Hub Color/Line Status Green; Infusing 7/30/2019 12:47 PM   Alcohol Cap Used No 7/30/2019 12:47 PM        Opportunity for questions and clarification was provided.       Patient transported with:   O2 @ 2 liters

## 2019-07-31 VITALS
HEART RATE: 79 BPM | BODY MASS INDEX: 31.9 KG/M2 | HEIGHT: 69 IN | OXYGEN SATURATION: 96 % | DIASTOLIC BLOOD PRESSURE: 67 MMHG | RESPIRATION RATE: 16 BRPM | WEIGHT: 215.4 LBS | TEMPERATURE: 97.3 F | SYSTOLIC BLOOD PRESSURE: 137 MMHG

## 2019-07-31 LAB
CALCIUM SERPL-MCNC: 8.3 MG/DL (ref 8.3–10.4)
CALCIUM SERPL-MCNC: 8.7 MG/DL (ref 8.3–10.4)

## 2019-07-31 PROCEDURE — 36415 COLL VENOUS BLD VENIPUNCTURE: CPT

## 2019-07-31 PROCEDURE — 94760 N-INVAS EAR/PLS OXIMETRY 1: CPT

## 2019-07-31 PROCEDURE — 74011250637 HC RX REV CODE- 250/637: Performed by: OTOLARYNGOLOGY

## 2019-07-31 PROCEDURE — 74011250636 HC RX REV CODE- 250/636: Performed by: OTOLARYNGOLOGY

## 2019-07-31 PROCEDURE — 99218 HC RM OBSERVATION: CPT

## 2019-07-31 PROCEDURE — 82310 ASSAY OF CALCIUM: CPT

## 2019-07-31 RX ADMIN — MORPHINE SULFATE 2 MG: 2 INJECTION, SOLUTION INTRAMUSCULAR; INTRAVENOUS at 03:15

## 2019-07-31 RX ADMIN — METFORMIN HYDROCHLORIDE 500 MG: 500 TABLET ORAL at 07:34

## 2019-07-31 RX ADMIN — ONDANSETRON 4 MG: 2 INJECTION INTRAMUSCULAR; INTRAVENOUS at 03:15

## 2019-07-31 RX ADMIN — LISINOPRIL 20 MG: 20 TABLET ORAL at 08:24

## 2019-07-31 RX ADMIN — PANTOPRAZOLE SODIUM 40 MG: 40 TABLET, DELAYED RELEASE ORAL at 08:23

## 2019-07-31 RX ADMIN — METOPROLOL SUCCINATE 100 MG: 100 TABLET, EXTENDED RELEASE ORAL at 08:24

## 2019-07-31 RX ADMIN — MORPHINE SULFATE 2 MG: 2 INJECTION, SOLUTION INTRAMUSCULAR; INTRAVENOUS at 00:00

## 2019-07-31 RX ADMIN — ALLOPURINOL 300 MG: 300 TABLET ORAL at 08:24

## 2019-07-31 RX ADMIN — CALCIUM CARBONATE 500 MG (1,250 MG)-VITAMIN D3 200 UNIT TABLET 2 TABLET: at 07:34

## 2019-07-31 RX ADMIN — ASPIRIN 81 MG: 81 TABLET ORAL at 08:24

## 2019-07-31 RX ADMIN — LEVOTHYROXINE SODIUM 150 MCG: 75 TABLET ORAL at 08:23

## 2019-07-31 RX ADMIN — HEPARIN SODIUM 5000 UNITS: 5000 INJECTION INTRAVENOUS; SUBCUTANEOUS at 07:36

## 2019-07-31 NOTE — DISCHARGE INSTRUCTIONS
-There are steri-strips in place over your neck incision. These will be removed at your post-op visit. You may shower/bathe as long as these steri-strips remain out of the water.  -No strenuous activity or heavy lifting for 2 weeks  -Please start w/ soft foods and advance to a regular diet  -You are stopping your Methimazole and will start taking Synthroid daily  -Please decrease your dose of Oscal-D to 1 tab TID and we will recheck your Ca at your next visit      DISCHARGE SUMMARY from Nurse    PATIENT INSTRUCTIONS:    After general anesthesia or intravenous sedation, for 24 hours or while taking prescription Narcotics:  · Limit your activities  · Do not drive and operate hazardous machinery  · Do not make important personal or business decisions  · Do  not drink alcoholic beverages  · If you have not urinated within 8 hours after discharge, please contact your surgeon on call. Report the following to your surgeon:  · Excessive pain, swelling, redness or odor of or around the surgical area  · Temperature over 100.5  · Nausea and vomiting lasting longer than 4 hours or if unable to take medications  · Any signs of decreased circulation or nerve impairment to extremity: change in color, persistent  numbness, tingling, coldness or increase pain  · Any questions    What to do at Home:  Recommended activity: Activity as tolerated, see above instructions from surgeon     If you experience any of the following symptoms: severe pain, nausea, or vomiting; fever or chills; s/s of wound infection; other concerns or questions, please follow up with Dr. Rosio Irvin. *  Please give a list of your current medications to your Primary Care Provider. *  Please update this list whenever your medications are discontinued, doses are      changed, or new medications (including over-the-counter products) are added. *  Please carry medication information at all times in case of emergency situations.     These are general instructions for a healthy lifestyle:    No smoking/ No tobacco products/ Avoid exposure to second hand smoke  Surgeon General's Warning:  Quitting smoking now greatly reduces serious risk to your health. Obesity, smoking, and sedentary lifestyle greatly increases your risk for illness    A healthy diet, regular physical exercise & weight monitoring are important for maintaining a healthy lifestyle    You may be retaining fluid if you have a history of heart failure or if you experience any of the following symptoms:  Weight gain of 3 pounds or more overnight or 5 pounds in a week, increased swelling in our hands or feet or shortness of breath while lying flat in bed. Please call your doctor as soon as you notice any of these symptoms; do not wait until your next office visit. The discharge information has been reviewed with the patient. The patient verbalized understanding. Discharge medications reviewed with the patient and appropriate educational materials and side effects teaching were provided. ___________________________________________________________________________________________________________________________________    Patient Education        Thyroidectomy: What to Expect at Home  Your Recovery    Thyroidectomy is the removal of the thyroid gland, which is shaped like a butterfly and lies across the windpipe (trachea). The gland makes hormones that control how your body makes and uses energy (metabolism). A doctor removes the gland when a tumor is present. The doctor may also remove the gland if you have an enlarged thyroid that is causing symptoms that bother you. Most tumors that grow in the thyroid gland are benign, meaning they are not cancer. You may leave the hospital with stitches in the cut (incision) the doctor made. Your doctor will tell you if you need to come back to have these removed. You may still have a tube called a drain in your neck.  Your doctor will take this out a few days after your surgery. You may have some trouble chewing and swallowing after you go home. Your voice probably will be hoarse, and you may have trouble talking. For most people, these problems get better within 3 to 4 months, but it can take as long as a year. In some cases, this surgery causes permanent problems with chewing, speaking, or swallowing. This care sheet gives you a general idea about how long it will take for you to recover. But each person recovers at a different pace. Follow the steps below to get better as quickly as possible. How can you care for yourself at home? Activity    · Rest when you feel tired. Getting enough sleep will help you recover. When you lie down, put two or three pillows under your head to keep it raised.     · Try to walk each day. Start by walking a little more than you did the day before. Bit by bit, increase the amount you walk. Walking boosts blood flow and helps prevent pneumonia and constipation.     · Avoid strenuous physical activity and lifting heavy objects for 3 weeks after surgery or until your doctor says it is okay.     · Do not over-extend your neck backwards for 2 weeks after surgery.     · Ask your doctor when you can drive again.     · You may take a shower, unless you still have a drain near your incision. Pat the incision dry. If you have a drain, follow your doctor's instructions to care for it. Diet    · If it is painful to swallow, start out with cold drinks, flavored ice pops, and ice cream. Next, try soft foods like pudding, yogurt, canned or cooked fruit, scrambled eggs, and mashed potatoes. Avoid eating hard or scratchy foods like chips or raw vegetables. Avoid orange or tomato juice and other acidic foods that can sting the throat.     · If you cough right after drinking, try drinking thicker liquids, such as a smoothie.     · You may notice that your bowel movements are not regular right after your surgery. This is common.  Try to avoid constipation and straining with bowel movements. You may want to take a fiber supplement every day. If you have not had a bowel movement after a couple of days, ask your doctor about taking a mild laxative. Medicines    · Your doctor will tell you if and when you can restart your medicines. He or she will also give you instructions about taking any new medicines.     · If you take blood thinners, such as warfarin (Coumadin), clopidogrel (Plavix), or aspirin, be sure to talk to your doctor. He or she will tell you if and when to start taking those medicines again. Make sure that you understand exactly what your doctor wants you to do.     · Be safe with medicines. Take pain medicines exactly as directed. ? If the doctor gave you a prescription medicine for pain, take it as prescribed. ? If you are not taking a prescription pain medicine, ask your doctor if you can take an over-the-counter medicine.     · If you think your pain medicine is making you sick to your stomach:  ? Take your medicine after meals (unless your doctor has told you not to). ? Ask your doctor for a different pain medicine.     · Your doctor may prescribe calcium to prevent problems after surgery from low calcium. Not having enough calcium can cause symptoms such as tingling around your mouth or in your hands and feet.     · Your doctor may have prescribed antibiotics. Take them as directed. Do not stop taking them just because you feel better. You need to take the full course of antibiotics. Incision care    · If your doctor told you how to care for your incision, follow your doctor's instructions. If you did not get instructions, follow this general advice:  ? After the first 24 to 48 hours, wash around the wound with clean water 2 times a day. Don't use hydrogen peroxide or alcohol, which can slow healing.     · You may have a drain near your incision. Your doctor will tell you how to take care of it.    Follow-up care is a key part of your treatment and safety. Be sure to make and go to all appointments, and call your doctor if you are having problems. It's also a good idea to know your test results and keep a list of the medicines you take. When should you call for help? Call 911 anytime you think you may need emergency care. For example, call if:    · You passed out (lost consciousness).     · You have sudden chest pain and shortness of breath, or you cough up blood.     · You have severe trouble breathing.    Call your doctor now or seek immediate medical care if:    · You have loose stitches, or your incision comes open.     · Bleeding from your incision soaks through your bandages.     · You have signs of infection, such as:  ? Increased pain, swelling, warmth, or redness. ? Red streaks leading from the incision. ? Pus draining from the incision. ? A fever.     · You have a tingling feeling around your mouth.     · You have cramping or tingling in your hands and feet.    Watch closely for changes in your health, and be sure to contact your doctor if:    · You have trouble talking.     · You are sick to your stomach or cannot keep fluids down.     · You do not have a bowel movement after taking a laxative. Where can you learn more? Go to http://juan-garfield.info/. Enter [id number] in the search box to learn more about \"Thyroidectomy: What to Expect at Home. \"  Current as of: November 6, 2018  Content Version: 12.1  © 4505-5504 [employer]. Care instructions adapted under license by [employer] (which disclaims liability or warranty for this information). If you have questions about a medical condition or this instruction, always ask your healthcare professional. [place] [address] any warranty or liability for your use of this information.

## 2019-07-31 NOTE — PROGRESS NOTES
Report received from [facility]. Admission assessment completed. PT is AAO x 4 with normal unlabored respirations present on 2L O2 via NC. IV site is CDI. SANIYA drain is CDI, patent charged and draining. Pt states pain is 2/10 denies any needs at this time. Bed is low and locked with alarm set, call light in reach, will continue to monitor.

## 2019-07-31 NOTE — OP NOTES
[place]  OPERATIVE REPORT    Name:  Edward Craft  MR#:  [id number]  :  1945  ACCOUNT #:  [de-identified]  DATE OF SERVICE:  2019    PREOPERATIVE DIAGNOSIS:  Toxic multinodular goiter. POSTOPERATIVE DIAGNOSIS:  Toxic multinodular goiter. PROCEDURE PERFORMED:  Total thyroidectomy. SURGEON:  Jluis Schultz MD    ANESTHESIA:  General endotracheal.    ANESTHESIOLOGIST:  Glendy Monroe MD    OPERATIVE FINDINGS:  1. There was an 8.5 cm nodule within a very hypervascular left thyroid lobe, consistent with a toxic multinodular goiter. The right thyroid lobe was normal in appearance. Total thyroidectomy was performed. 2.  Both recurrent laryngeal nerves were identified and carefully dissected out. There was strong stimulation of the right nerve, but weaker stimulation of the left nerve at the end of the case. 3.  The left superior, right superior, and right inferior parathyroid glands were all identified and preserved. IV FLUIDS:  1800 mL crystalloid. COMPLICATIONS:  None. SPECIMENS REMOVED:  Total thyroidectomy - permanent. DRAINS:  SANIYA x 1. ESTIMATED BLOOD LOSS:  40 mL. Disposition:  PACU then third floor. CONDITION:  Stable. BRIEF HISTORY:  The patient is a 32-year-old male with a long history of a thyroid goiter with a dominant left sided nodule. He has had rapid weight loss and was diagnosed with a toxic multinodular goiter by the endocrinologist.  He had an ultrasound, which revealed an 8 cm nodule within a very hypervascular left thyroid lobe. He underwent fine needle aspirate, which was fortunately benign. He had been having some dysphagia and had trouble getting his TSH to a normal level. Therefore, the decision was made to take him to the operating room for total thyroidectomy. DESCRIPTION OF PROCEDURE:  The patient was brought back to the operating room, placed on the table in a supine position.  General endotracheal anesthesia was inducted without any complications. A [employer] recurrent laryngeal nerve monitoring tube was placed and its position was confirmed within the glottis using the GlideScope. He was then sterilely prepped and draped. Once he was adequately sedated, I injected a total of 9 ml of 1% lidocaine with 100,000 epinephrine along the planned neck incision. He was then sterilely prepped and draped in the usual fashion. I began by designing a 7 cm incision along a natural neck skin crease approximately two fingerbreadths above the sternal notch. I incised the skin and dermis with a 15 blade and then dissected through some subcutaneous fat and a very thick platysma muscle using Bovie electrocautery. Next, superior and inferiorly based subplatysmal skin flaps were raised for improved exposure. I then dissected down along the midline raphe and lateralized the strap muscles. I dissected down onto a normal appearing thyroid isthmus. I then developed a dissection plane just deep to the strap muscles on the left side to expose a very large and hypervascular multinodular left lobe. I dissected laterally all the way to the carotid artery. To help with exposure, I incised across the strap muscles transversely. This helped for much improved exposure laterally. The middle thyroid vein was ligated and divided. I continued dissecting superiorly up towards the superior pole. I then grasped the superior pole with a Jackson clamp and used careful blunt dissection to ligate and divide the superior vascular pedicle. I continued dissecting along the superior lateral aspect of the gland. The left superior parathyroid gland was identified during this dissection and carefully dissected away from the thyroid capsule. I moved inferiorly and I dissected down onto the anterior tracheal wall and then ligated and divided the inferior thyroid vein.   I then retracted the gland from lateral to medial and identified the left recurrent laryngeal nerve distally near its insertion of the cricothyroid joint. The nerve was found easily, but there was fairly weak stimulation using the nerve monitor, which was unusual.  I carefully traced out the course of the nerve. I divided the inferior thyroid artery. I could not identify the left inferior parathyroid gland but I stayed tight to the thyroid capsule to preserve its integrity. While protecting the underlying nerve branch I dissected across Zhao's ligament onto the anterior tracheal wall. The left thyroid lobe was resected en bloc and then I turned my attention the right side. There was a much smaller right thyroid lobe with no dominant nodules. I dissected deep to the strap muscles to expose the right thyroid lobe. I then moved superiorly and after grasping the superior pole with a Gem clamp, I carefully ligated and divided the superior vascular pedicle. I continued my dissection along the superior and lateral aspect of the gland. I identified the right superior parathyroid gland and it was carefully dissected away from the thyroid capsule and preserved. I then moved inferiorly. I previously had dissected down onto the anterior tracheal wall during my dissection of the right left lobe. I continued this plane onto the right side. Inferior thyroid vein was ligated and divided. I identified the right inferior parathyroid gland and it was carefully dissected away from the thyroid capsule and preserved. I then retracted the gland from lateral to medial and identified the right recurrent laryngeal nerve distally near its insertion at the cricothyroid joint. There was much stronger stimulation of the right nerve compared to the left side. The inferior thyroid artery was ligated and divided. While protecting the underlying nerve branch, I carefully dissected across Zhao's ligament onto the anterior tracheal wall. There was no pyramidal lobe.   The gland was removed en bloc and passed off for permanent pathology with a suture on the left side for pathologic orientation. I irrigated out the wound bed and ensured adequate hemostasis using bipolar electrocautery. I then placed a single 10-[place]ian round SANIYA drain through a separate stab incision approximately 1 cm below the incision line. This was secured in place with a 2-0 silk drain suture. I then re-approximated the strap muscles in the midline with a running 3-0 Vicryl suture. The incision was then closed in layers using 3-0 undyed Vicryl deep sutures to reapproximate the platysma and the dermis followed by 5-0 running subcutaneous Monocryl suture for the skin. Mastisol and Steri-Strips were placed over the incision. This concluded the surgical portion of procedure. The patient was then awakened from anesthesia, extubated and taken to PACU in stable condition afterwards.         Lady Marta MD      [id number]  D:  07/30/2019 17:35  T:  07/30/2019 17:40  JOB #:  [id number]

## 2019-07-31 NOTE — PROGRESS NOTES
Pt's repeat Ca was 8.7- safe for D/C home. Will have him cut Oscal dose in 1/2 at home- 1 tab TID and will recheck Ca at post-op visit.

## 2019-07-31 NOTE — PROGRESS NOTES
07/30/19 1920   Dual Skin Pressure Injury Assessment   Dual Skin Pressure Injury Assessment WDL   Second Care Provider (Based on Facility Policy) Colt Pierre RN   Skin Integumentary   Skin Integumentary (WDL) X   Skin Color Appropriate for ethnicity; Ecchymosis (comment)  (base of neck, L heel)   Skin Condition/Temp Dry; Warm   Skin Integrity Incision (comment)  (base of neck, JPD CDI)   Turgor Non-tenting

## 2019-07-31 NOTE — DISCHARGE SUMMARY
Ear/Nose/Throat Discharge Summary      Patient ID:  Delmar Bridges  [id number]  51 y.o.  1945    Allergies: Pcn [penicillins]    Admit Date: 7/30/2019    Discharge Date: 7/31/2019     Admitting Physician: Kimberly Lopes MD     Discharge Physician: AIDNA Diamond    * Admission Diagnoses: Toxic MNG    * Discharge Diagnoses: Toxic MNG    Admission Condition: good    * Discharged Condition: good    [facility] Course: Admitted after undergoing total thyroidectomy on 7/31/19. Did well in PACU and then to 3rd floor. Pain well controlled. I left SANIYA drain in place due to size of goiter. Ca was stable 9.0/8/3/8.7. Drain removed on morning of POD 1 and deemed stable for D/C home    * Procedures:   Procedure(s):  TOTAL THYROIDECTOMY / NIMS      Consults: None    Significant Diagnostic Studies: routine post-op Ca checks     Treatments: routine post-op pain control and hydration    Discharge Exam:  -NAD, well-appearing  -Neck incision w/ steri strips in place, mild ecchymosis of skin but no hematomas  -Voice strong w/ no stridor or breathiness  -No cervical LAD or neck masses    * Disposition: Home    Discharge Medications:   Current Discharge Medication List      CONTINUE these medications which have NOT CHANGED    Details   calcium-vitamin D (OYSTER SHELL) 500 mg(1,250mg) -200 unit per tablet Take 2 Tabs by mouth three (3) times daily (with meals). Start taking 1 week prior to surgery  Qty: 100 Tab, Refills: 0      eplerenone (INSPRA) 50 mg tab tablet Take 50 mg by mouth daily. metFORMIN (GLUCOPHAGE) 500 mg tablet Take 1 Tab by mouth two (2) times daily (with meals). Qty: 180 Tab, Refills: 1    Associated Diagnoses: Controlled type 2 diabetes mellitus with diabetic neuropathy, without long-term current use of insulin (HCC)      benazepril (LOTENSIN) 20 mg tablet Take 1 Tab by mouth daily.   Qty: 90 Tab, Refills: 1    Associated Diagnoses: Essential hypertension      rosuvastatin (CRESTOR) 20 mg tablet Take 1 Tab by mouth nightly. Qty: 90 Tab, Refills: 1    Associated Diagnoses: Controlled type 2 diabetes mellitus with diabetic neuropathy, without long-term current use of insulin ([facility] [address] Mixed hyperlipidemia; Hyperlipidemia LDL goal <70; Coronary artery disease involving native coronary artery of native heart without angina pectoris      allopurinol (ZYLOPRIM) 300 mg tablet Take 1 Tab by mouth daily. Indications: treatment to prevent acute gout attack  Qty: 90 Tab, Refills: 1    Associated Diagnoses: Hyperuricemia      metoprolol succinate (TOPROL-XL) 100 mg tablet Take 1 Tab by mouth daily. Qty: 90 Tab, Refills: 1    Associated Diagnoses: Essential hypertension      aspirin delayed-release 81 mg tablet Take 1 Tab by mouth daily. Qty: 80 Tab, Refills: 3    Associated Diagnoses: Coronary artery disease involving native coronary artery of native heart without angina pectoris      ibuprofen (MOTRIN) 200 mg tablet Take 800 mg by mouth nightly as needed. !! levothyroxine (SYNTHROID) 150 mcg tablet Take 1 Tab by mouth Daily (before breakfast). Qty: 30 Tab, Refills: 6      ondansetron (ZOFRAN ODT) 8 mg disintegrating tablet Take 1 Tab by mouth every eight (8) hours as needed for Nausea. Qty: 8 Tab, Refills: 0    Associated Diagnoses: Toxic multinodular goiter; Hyperthyroidism      !! levothyroxine (SYNTHROID) 150 mcg tablet Take 1 Tab by mouth Daily (before breakfast). Start after thyroid surgery  Qty: 30 Tab, Refills: 11    Associated Diagnoses: Multinodular goiter      omeprazole (PRILOSEC) 20 mg capsule Take 1 Cap by mouth two (2) times a day. Qty: 180 Cap, Refills: 1    Associated Diagnoses: Gastroesophageal reflux disease, esophagitis presence not specified      colchicine (COLCRYS) 0.6 mg tablet Take 1 Tab by mouth daily. Indications: prevention of acute gout attack  Qty: 30 Tab, Refills: 0    Associated Diagnoses: Prophylactic measure; Hyperuricemia;  Idiopathic chronic gout of foot without tophus, unspecified laterality      nitroglycerin (NITROSTAT) 0.4 mg SL tablet 1 Tab by SubLINGual route every five (5) minutes as needed for Chest Pain. Qty: 1 Bottle, Refills: 1    Associated Diagnoses: Coronary artery disease involving other coronary artery bypass graft without angina pectoris      glucose blood VI test strips (ONETOUCH VERIO) strip Check fbs and  2 hr pc bs q  Day  Qty: 100 Strip, Refills: 5    Associated Diagnoses: Type II diabetes mellitus with complication, uncontrolled (HCC)      Blood-Glucose Meter (ONETOUCH VERIO SYSTEM) misc Ck fbs q day adn  A 2 hr prn bs after lunch or supper  Indications: sample given  Qty: 1 Each, Refills: 0    Associated Diagnoses: Type II diabetes mellitus with complication, uncontrolled (HCC)      lancets 30 gauge misc Ck fbs and  2 hr pc bs q day and log it down daily  Qty: 100 Package, Refills: 5    Associated Diagnoses: Type II diabetes mellitus with complication, uncontrolled ([address]       ! ! - Potential duplicate medications found. Please discuss with provider. STOP taking these medications       methIMAzole (TAPAZOLE) 5 mg tablet Comments:   Reason for Stopping:               * Follow-up Care/Patient Instructions:    Activity: No lifting, Driving, or Strenuous exercise for 1 wk    Diet: Regular Diet    Wound Care: Keep wound clean and dry- you may shower/bathe as long as incision stays out of water    POST-OP APPT- 8/12/19 @ 1030 w/ Dr. Doyle Ochoa at [facility] [address] ENT    Signed:  Aniyah Serrato MD  7/31/2019  9:57 AM

## 2019-07-31 NOTE — PROGRESS NOTES
Discharge instructions were reviewed with the patient and his wife. Opportunity for questions given. Patient verbalized understanding of discharge and follow up instructions, as well as S/S to report to MD or return to ER for. PIV was removed. Patient will D/C to home.

## 2019-07-31 NOTE — PROGRESS NOTES
71 yo male POD 1 after total thyroidectomy- did well overnight. Minimal pain. Ambulating. Taking in some liquids. Denies numbness/tingling in extremities. Swallowing better already. Visit Vitals  /66 (BP 1 Location: Left arm, BP Patient Position: At rest;Supine)   Pulse 80   Temp 97.4 °F (36.3 °C)   Resp 18   Ht 5' 9\" (1.753 m)   Wt 215 lb 6.4 oz (97.7 kg)   SpO2 96%   BMI 31.81 kg/m²     -Neck incision c/d/i, steri strips in place, mod ecchymosis of skin but no hematomas  -SANIYA w/ serosanguinous drainage- 20 cc overall  -Strong voice- no stridor    Ca: 9.0/8.3    A/P: Toxic MNG- doing well after thyroidectomy yesterday. Ca normal but decreasing- will recheck once more this morning. Drain removed. Hopeful D/c later this morning.     Gadiel Ayala MD

## 2019-07-31 NOTE — PROGRESS NOTES
Pt is alert and talkative; reports less pain. Drain removed. Nicol meds and liquids; will advance diet as nicol. Probable discharge today after calcium recheck.

## 2019-10-23 PROBLEM — C73 PAPILLARY THYROID CARCINOMA (HCC): Status: ACTIVE | Noted: 2019-10-23

## 2019-10-23 PROBLEM — E05.20 TOXIC MULTINODUL GOITER: Status: RESOLVED | Noted: 2019-07-30 | Resolved: 2019-10-23

## 2019-10-23 PROBLEM — E04.2 MULTINODULAR GOITER: Status: RESOLVED | Noted: 2019-06-05 | Resolved: 2019-10-23

## 2019-10-23 PROBLEM — E05.90 HYPERTHYROIDISM: Status: RESOLVED | Noted: 2019-06-05 | Resolved: 2019-10-23

## 2019-10-23 PROBLEM — E89.0 POSTSURGICAL HYPOTHYROIDISM: Status: ACTIVE | Noted: 2019-10-23

## 2021-10-15 ENCOUNTER — HOSPITAL ENCOUNTER (OUTPATIENT)
Dept: MRI IMAGING | Age: 76
Discharge: HOME | End: 2021-10-15
Attending: UROLOGY

## 2021-10-15 DIAGNOSIS — R97.20 ELEVATED PSA: ICD-10-CM

## 2022-01-26 PROBLEM — C73 PAPILLARY THYROID CARCINOMA (HCC): Status: RESOLVED | Noted: 2019-10-23 | Resolved: 2022-01-26

## 2022-03-18 PROBLEM — R13.19 ESOPHAGEAL DYSPHAGIA: Status: ACTIVE | Noted: 2019-06-05

## 2022-03-18 PROBLEM — E78.5 HYPERLIPIDEMIA LDL GOAL <70: Status: ACTIVE | Noted: 2019-05-15

## 2022-03-18 PROBLEM — K21.00 REFLUX ESOPHAGITIS: Status: ACTIVE | Noted: 2018-01-03

## 2022-03-18 PROBLEM — E11.40 CONTROLLED TYPE 2 DIABETES MELLITUS WITH DIABETIC NEUROPATHY, WITHOUT LONG-TERM CURRENT USE OF INSULIN (HCC): Status: ACTIVE | Noted: 2019-05-15

## 2022-03-19 PROBLEM — I10 ESSENTIAL HYPERTENSION: Status: ACTIVE | Noted: 2018-01-03

## 2022-03-19 PROBLEM — F51.01 PRIMARY INSOMNIA: Status: ACTIVE | Noted: 2018-01-03

## 2022-03-19 PROBLEM — E89.0 POSTSURGICAL HYPOTHYROIDISM: Status: ACTIVE | Noted: 2019-10-23

## 2022-03-19 PROBLEM — E78.2 MIXED HYPERLIPIDEMIA: Status: ACTIVE | Noted: 2019-05-15

## 2022-03-19 PROBLEM — I25.10 CORONARY ARTERY DISEASE INVOLVING NATIVE CORONARY ARTERY OF NATIVE HEART WITHOUT ANGINA PECTORIS: Status: ACTIVE | Noted: 2019-05-15

## 2022-03-20 PROBLEM — M1A.9XX0 CHRONIC GOUT INVOLVING TOE OF LEFT FOOT: Status: ACTIVE | Noted: 2018-01-03

## 2022-03-20 PROBLEM — E79.0 HYPERURICEMIA: Status: ACTIVE | Noted: 2019-05-15

## 2022-04-11 PROBLEM — C73 PAPILLARY THYROID CARCINOMA (HCC): Status: ACTIVE | Noted: 2019-10-23

## 2022-04-18 ENCOUNTER — HOSPITAL ENCOUNTER (INPATIENT)
Age: 77
LOS: 3 days | Discharge: HOME | DRG: 683 | End: 2022-04-21
Attending: EMERGENCY MEDICINE | Admitting: INTERNAL MEDICINE
Payer: MEDICARE

## 2022-04-18 ENCOUNTER — NURSE TRIAGE (OUTPATIENT)
Dept: OTHER | Facility: CLINIC | Age: 77
End: 2022-04-18

## 2022-04-18 ENCOUNTER — APPOINTMENT (OUTPATIENT)
Dept: CT IMAGING | Age: 77
DRG: 683 | End: 2022-04-18
Attending: EMERGENCY MEDICINE
Payer: MEDICARE

## 2022-04-18 ENCOUNTER — APPOINTMENT (OUTPATIENT)
Dept: ULTRASOUND IMAGING | Age: 77
DRG: 683 | End: 2022-04-18
Attending: INTERNAL MEDICINE
Payer: MEDICARE

## 2022-04-18 DIAGNOSIS — N17.9 ACUTE RENAL FAILURE, UNSPECIFIED ACUTE RENAL FAILURE TYPE (HCC): Primary | ICD-10-CM

## 2022-04-18 DIAGNOSIS — R11.2 NAUSEA AND VOMITING, UNSPECIFIED VOMITING TYPE: ICD-10-CM

## 2022-04-18 DIAGNOSIS — R10.31 ABDOMINAL PAIN, RIGHT LOWER QUADRANT: ICD-10-CM

## 2022-04-18 PROBLEM — K52.9 GASTROENTERITIS: Status: ACTIVE | Noted: 2022-04-18

## 2022-04-18 PROBLEM — I51.89 DIASTOLIC DYSFUNCTION: Status: ACTIVE | Noted: 2022-04-18

## 2022-04-18 PROBLEM — E87.20 METABOLIC ACIDOSIS: Status: ACTIVE | Noted: 2022-04-18

## 2022-04-18 PROBLEM — R31.9 HEMATURIA, UNSPECIFIED: Status: ACTIVE | Noted: 2022-04-18

## 2022-04-18 PROBLEM — E87.5 HYPERKALEMIA: Status: ACTIVE | Noted: 2022-04-18

## 2022-04-18 PROBLEM — D64.9 ANEMIA: Status: ACTIVE | Noted: 2022-04-18

## 2022-04-18 PROBLEM — I51.89 DIASTOLIC DYSFUNCTION: Chronic | Status: ACTIVE | Noted: 2022-04-18

## 2022-04-18 LAB
ABO + RH BLD: NORMAL
ALBUMIN SERPL-MCNC: 4 G/DL (ref 3.2–4.6)
ALBUMIN/GLOB SERPL: 1.2 {RATIO} (ref 1.2–3.5)
ALP SERPL-CCNC: 73 U/L (ref 50–136)
ALT SERPL-CCNC: 27 U/L (ref 12–65)
ANION GAP SERPL CALC-SCNC: 15 MMOL/L (ref 7–16)
AST SERPL-CCNC: 13 U/L (ref 15–37)
BACTERIA URNS QL MICRO: 0 /HPF
BASOPHILS # BLD: 0.1 K/UL (ref 0–0.2)
BASOPHILS NFR BLD: 1 % (ref 0–2)
BILIRUB SERPL-MCNC: 0.4 MG/DL (ref 0.2–1.1)
BILIRUB UR QL: ABNORMAL
BLOOD GROUP ANTIBODIES SERPL: NORMAL
BUN SERPL-MCNC: 114 MG/DL (ref 8–23)
CALCIUM SERPL-MCNC: 9.3 MG/DL (ref 8.3–10.4)
CASTS URNS QL MICRO: NORMAL /LPF
CHLORIDE SERPL-SCNC: 108 MMOL/L (ref 98–107)
CO2 SERPL-SCNC: 14 MMOL/L (ref 21–32)
COVID-19 RAPID TEST, COVR: NOT DETECTED
COVID-19 RAPID TEST, COVR: NOT DETECTED
CREAT SERPL-MCNC: 7.7 MG/DL (ref 0.8–1.5)
DIFFERENTIAL METHOD BLD: ABNORMAL
EOSINOPHIL # BLD: 0 K/UL (ref 0–0.8)
EOSINOPHIL NFR BLD: 0 % (ref 0.5–7.8)
EPI CELLS #/AREA URNS HPF: NORMAL /HPF
ERYTHROCYTE [DISTWIDTH] IN BLOOD BY AUTOMATED COUNT: 14.8 % (ref 11.9–14.6)
GLOBULIN SER CALC-MCNC: 3.4 G/DL (ref 2.3–3.5)
GLUCOSE BLD STRIP.AUTO-MCNC: 84 MG/DL (ref 65–100)
GLUCOSE SERPL-MCNC: 126 MG/DL (ref 65–100)
GLUCOSE UR QL STRIP.AUTO: NEGATIVE MG/DL
HCT VFR BLD AUTO: 40.1 % (ref 41.1–50.3)
HEMOCCULT STL QL: POSITIVE
HGB BLD-MCNC: 13.4 G/DL (ref 13.6–17.2)
IMM GRANULOCYTES # BLD AUTO: 0 K/UL (ref 0–0.5)
IMM GRANULOCYTES NFR BLD AUTO: 0 % (ref 0–5)
KETONES UR-MCNC: ABNORMAL MG/DL
LEUKOCYTE ESTERASE UR QL STRIP: ABNORMAL
LIPASE SERPL-CCNC: 565 U/L (ref 73–393)
LYMPHOCYTES # BLD: 2.8 K/UL (ref 0.5–4.6)
LYMPHOCYTES NFR BLD: 26 % (ref 13–44)
MAGNESIUM SERPL-MCNC: 2.2 MG/DL (ref 1.8–2.4)
MCH RBC QN AUTO: 31.4 PG (ref 26.1–32.9)
MCHC RBC AUTO-ENTMCNC: 33.4 G/DL (ref 31.4–35)
MCV RBC AUTO: 93.9 FL (ref 79.6–97.8)
MONOCYTES # BLD: 1 K/UL (ref 0.1–1.3)
MONOCYTES NFR BLD: 9 % (ref 4–12)
NEUTS SEG # BLD: 6.9 K/UL (ref 1.7–8.2)
NEUTS SEG NFR BLD: 64 % (ref 43–78)
NITRITE UR QL: NEGATIVE
NRBC # BLD: 0 K/UL (ref 0–0.2)
PH UR: 5 [PH] (ref 5–9)
PLATELET # BLD AUTO: 168 K/UL (ref 150–450)
PMV BLD AUTO: 11.8 FL (ref 9.4–12.3)
POTASSIUM SERPL-SCNC: 5.2 MMOL/L (ref 3.5–5.1)
PROT SERPL-MCNC: 7.4 G/DL (ref 6.3–8.2)
PROT UR QL: 30 MG/DL
RBC # BLD AUTO: 4.27 M/UL (ref 4.23–5.6)
RBC # UR STRIP: ABNORMAL /UL
RBC #/AREA URNS HPF: NORMAL /HPF
SARS-COV-2, COV2: NORMAL
SERVICE CMNT-IMP: ABNORMAL
SERVICE CMNT-IMP: NORMAL
SODIUM SERPL-SCNC: 137 MMOL/L (ref 136–145)
SOURCE, COVRS: NORMAL
SOURCE, COVRS: NORMAL
SP GR UR: >1.03 (ref 1–1.02)
SPECIMEN EXP DATE BLD: NORMAL
UROBILINOGEN UR QL: 0.2 EU/DL (ref 0.2–1)
WBC # BLD AUTO: 10.8 K/UL (ref 4.3–11.1)
WBC URNS QL MICRO: NORMAL /HPF

## 2022-04-18 PROCEDURE — 96361 HYDRATE IV INFUSION ADD-ON: CPT

## 2022-04-18 PROCEDURE — 83690 ASSAY OF LIPASE: CPT

## 2022-04-18 PROCEDURE — 81015 MICROSCOPIC EXAM OF URINE: CPT

## 2022-04-18 PROCEDURE — 83735 ASSAY OF MAGNESIUM: CPT

## 2022-04-18 PROCEDURE — 80048 BASIC METABOLIC PNL TOTAL CA: CPT

## 2022-04-18 PROCEDURE — 65270000046 HC RM TELEMETRY

## 2022-04-18 PROCEDURE — 99285 EMERGENCY DEPT VISIT HI MDM: CPT

## 2022-04-18 PROCEDURE — 81003 URINALYSIS AUTO W/O SCOPE: CPT

## 2022-04-18 PROCEDURE — 76770 US EXAM ABDO BACK WALL COMP: CPT

## 2022-04-18 PROCEDURE — 36415 COLL VENOUS BLD VENIPUNCTURE: CPT

## 2022-04-18 PROCEDURE — 86580 TB INTRADERMAL TEST: CPT | Performed by: INTERNAL MEDICINE

## 2022-04-18 PROCEDURE — 74011250636 HC RX REV CODE- 250/636: Performed by: EMERGENCY MEDICINE

## 2022-04-18 PROCEDURE — 74011000258 HC RX REV CODE- 258: Performed by: EMERGENCY MEDICINE

## 2022-04-18 PROCEDURE — 74011250636 HC RX REV CODE- 250/636: Performed by: INTERNAL MEDICINE

## 2022-04-18 PROCEDURE — 85025 COMPLETE CBC W/AUTO DIFF WBC: CPT

## 2022-04-18 PROCEDURE — 74011250637 HC RX REV CODE- 250/637: Performed by: INTERNAL MEDICINE

## 2022-04-18 PROCEDURE — 87635 SARS-COV-2 COVID-19 AMP PRB: CPT

## 2022-04-18 PROCEDURE — 74176 CT ABD & PELVIS W/O CONTRAST: CPT

## 2022-04-18 PROCEDURE — 96374 THER/PROPH/DIAG INJ IV PUSH: CPT

## 2022-04-18 PROCEDURE — 82962 GLUCOSE BLOOD TEST: CPT

## 2022-04-18 PROCEDURE — C9113 INJ PANTOPRAZOLE SODIUM, VIA: HCPCS | Performed by: INTERNAL MEDICINE

## 2022-04-18 PROCEDURE — 74011000250 HC RX REV CODE- 250: Performed by: INTERNAL MEDICINE

## 2022-04-18 PROCEDURE — 82272 OCCULT BLD FECES 1-3 TESTS: CPT

## 2022-04-18 PROCEDURE — 86900 BLOOD TYPING SEROLOGIC ABO: CPT

## 2022-04-18 PROCEDURE — 80053 COMPREHEN METABOLIC PANEL: CPT

## 2022-04-18 PROCEDURE — 87086 URINE CULTURE/COLONY COUNT: CPT

## 2022-04-18 PROCEDURE — 93005 ELECTROCARDIOGRAM TRACING: CPT | Performed by: INTERNAL MEDICINE

## 2022-04-18 RX ORDER — SODIUM CHLORIDE 0.9 % (FLUSH) 0.9 %
10 SYRINGE (ML) INJECTION
Status: ACTIVE | OUTPATIENT
Start: 2022-04-18 | End: 2022-04-19

## 2022-04-18 RX ORDER — SODIUM CHLORIDE 9 MG/ML
125 INJECTION, SOLUTION INTRAVENOUS CONTINUOUS
Status: DISCONTINUED | OUTPATIENT
Start: 2022-04-18 | End: 2022-04-19

## 2022-04-18 RX ORDER — ACETAMINOPHEN 325 MG/1
650 TABLET ORAL
Status: DISCONTINUED | OUTPATIENT
Start: 2022-04-18 | End: 2022-04-21 | Stop reason: HOSPADM

## 2022-04-18 RX ORDER — ONDANSETRON 4 MG/1
4 TABLET, ORALLY DISINTEGRATING ORAL
Status: DISCONTINUED | OUTPATIENT
Start: 2022-04-18 | End: 2022-04-21 | Stop reason: HOSPADM

## 2022-04-18 RX ORDER — INSULIN LISPRO 100 [IU]/ML
INJECTION, SOLUTION INTRAVENOUS; SUBCUTANEOUS
Status: DISCONTINUED | OUTPATIENT
Start: 2022-04-18 | End: 2022-04-21 | Stop reason: HOSPADM

## 2022-04-18 RX ORDER — ROSUVASTATIN CALCIUM 20 MG/1
20 TABLET, COATED ORAL
Status: DISCONTINUED | OUTPATIENT
Start: 2022-04-18 | End: 2022-04-21 | Stop reason: HOSPADM

## 2022-04-18 RX ORDER — ONDANSETRON 2 MG/ML
4 INJECTION INTRAMUSCULAR; INTRAVENOUS
Status: DISCONTINUED | OUTPATIENT
Start: 2022-04-18 | End: 2022-04-21 | Stop reason: HOSPADM

## 2022-04-18 RX ORDER — ACETAMINOPHEN 650 MG/1
650 SUPPOSITORY RECTAL
Status: DISCONTINUED | OUTPATIENT
Start: 2022-04-18 | End: 2022-04-21 | Stop reason: HOSPADM

## 2022-04-18 RX ORDER — ASPIRIN 81 MG/1
81 TABLET ORAL DAILY
Status: DISCONTINUED | OUTPATIENT
Start: 2022-04-19 | End: 2022-04-21 | Stop reason: HOSPADM

## 2022-04-18 RX ORDER — HEPARIN SODIUM 5000 [USP'U]/ML
5000 INJECTION, SOLUTION INTRAVENOUS; SUBCUTANEOUS EVERY 8 HOURS
Status: DISCONTINUED | OUTPATIENT
Start: 2022-04-18 | End: 2022-04-19

## 2022-04-18 RX ORDER — SODIUM CHLORIDE 0.9 % (FLUSH) 0.9 %
5-10 SYRINGE (ML) INJECTION EVERY 8 HOURS
Status: DISCONTINUED | OUTPATIENT
Start: 2022-04-18 | End: 2022-04-21 | Stop reason: HOSPADM

## 2022-04-18 RX ORDER — SODIUM CHLORIDE 0.9 % (FLUSH) 0.9 %
5-10 SYRINGE (ML) INJECTION AS NEEDED
Status: DISCONTINUED | OUTPATIENT
Start: 2022-04-18 | End: 2022-04-21 | Stop reason: HOSPADM

## 2022-04-18 RX ORDER — LEVOTHYROXINE SODIUM 112 UG/1
112 TABLET ORAL
Status: DISCONTINUED | OUTPATIENT
Start: 2022-04-19 | End: 2022-04-21 | Stop reason: HOSPADM

## 2022-04-18 RX ORDER — ONDANSETRON 2 MG/ML
4 INJECTION INTRAMUSCULAR; INTRAVENOUS
Status: COMPLETED | OUTPATIENT
Start: 2022-04-18 | End: 2022-04-18

## 2022-04-18 RX ORDER — SODIUM CHLORIDE 0.9 % (FLUSH) 0.9 %
5-40 SYRINGE (ML) INJECTION EVERY 8 HOURS
Status: DISCONTINUED | OUTPATIENT
Start: 2022-04-18 | End: 2022-04-21 | Stop reason: HOSPADM

## 2022-04-18 RX ORDER — SODIUM CHLORIDE 0.9 % (FLUSH) 0.9 %
5-40 SYRINGE (ML) INJECTION AS NEEDED
Status: DISCONTINUED | OUTPATIENT
Start: 2022-04-18 | End: 2022-04-21 | Stop reason: HOSPADM

## 2022-04-18 RX ADMIN — HEPARIN SODIUM 5000 UNITS: 5000 INJECTION INTRAVENOUS; SUBCUTANEOUS at 22:07

## 2022-04-18 RX ADMIN — SODIUM CHLORIDE 1000 ML: 900 INJECTION, SOLUTION INTRAVENOUS at 13:20

## 2022-04-18 RX ADMIN — Medication 5 UNITS: at 22:07

## 2022-04-18 RX ADMIN — CEFTRIAXONE 1 G: 1 INJECTION, POWDER, FOR SOLUTION INTRAMUSCULAR; INTRAVENOUS at 18:37

## 2022-04-18 RX ADMIN — SODIUM CHLORIDE 1000 ML: 900 INJECTION, SOLUTION INTRAVENOUS at 14:28

## 2022-04-18 RX ADMIN — SODIUM CHLORIDE, PRESERVATIVE FREE 10 ML: 5 INJECTION INTRAVENOUS at 22:22

## 2022-04-18 RX ADMIN — ROSUVASTATIN CALCIUM 20 MG: 20 TABLET, COATED ORAL at 22:06

## 2022-04-18 RX ADMIN — SODIUM CHLORIDE 125 ML/HR: 900 INJECTION, SOLUTION INTRAVENOUS at 22:17

## 2022-04-18 RX ADMIN — ONDANSETRON 4 MG: 2 INJECTION INTRAMUSCULAR; INTRAVENOUS at 13:21

## 2022-04-18 RX ADMIN — SODIUM CHLORIDE 40 MG: 9 INJECTION INTRAMUSCULAR; INTRAVENOUS; SUBCUTANEOUS at 20:31

## 2022-04-18 RX ADMIN — SODIUM CHLORIDE, PRESERVATIVE FREE 10 ML: 5 INJECTION INTRAVENOUS at 22:21

## 2022-04-18 NOTE — H&P
Hospitalist History and Physical   Admit Date:  2022  1:09 PM   Name:  Andrea Long   Age:  78 y.o. Sex:  male  :  1945   MRN:  [id number]   [facility]    Presenting Complaint: Vomiting    Reason(s) for Admission: RITA (acute kidney injury) ([facility] [N17.9]     History of Present Illness:       Andrea Long is a 78 y.o. male with medical history of BPH followed by urology (states needs biopsy of prostate), DM2, HTN, CAD, LVDD who is evaluated with ongoing weakness. Had GI bug for prior week with nausea/ emesis and diarrhea- has improved. No fever. Had RLQ ABD pain now resolved. Feels better after IVF. Daughter passed away this week with COVID but he denies sick contacts. Had prior COVID vaccine and no recent testing. Feels confused and dehydrated. Had some dark emesis and stooling but no josh bleeding. Has some longstanding dyspnea and sputum. No cough. Making urine in small amounts. Found with RITA, creatinine of 7.7, potassium 5.2. CTAP no acute issue, has chronic non obstructing right renal stone. Received 2 L NS bolus while in the ED. Has hematuria. Review of Systems:  10 systems reviewed and negative except as noted in HPI. -       Assessment & Plan:       RITA (acute kidney injury) ([facility]     Hyperkalemia  · Admit to remote tele  ·  cc/hr and reassess  · Nephrology consult   · Renal US  · Trend BMP  · Hold benazepril, mobic, metformin, inspra         Active Problems:    Essential hypertension   · Holding benazepril, inspra, lasix, metoprolol with RITA / borderline BPs          Controlled type 2 diabetes mellitus with diabetic neuropathy, without long-term current use of insulin (HCC)  ·  SSI      Mixed hyperlipidemia   · Continued crestor       Coronary artery disease involving native coronary artery of native heart without angina pectoris  · Continued crestor and asa      Postsurgical hypothyroidism   · Continued synthroid          Gastroenteritis · Resolved   · Clear liquids to advance as tolerant   · Check COVID swwab         Anemia  · Trend HGB  · Check occult stool       Metabolic acidosis   · Due to renal failure       Diastolic dysfunction   · Currently volume depleted       Hematuria   BPH:  · followup urology          Dispo/Discharge Planning:     Pending     Diet: No diet orders on file  VTE ppx: heparin  Code status: Prior       Anu Showers [phone]    [id number] Problems as of 4/18/2022 Date Reviewed: 4/7/2022          Codes Class Noted - Resolved POA    RITA (acute kidney injury) ([facility] ICD-10-CM: N17.9  ICD-9-CM: 584.9  4/18/2022 - Present Yes        Gastroenteritis ICD-10-CM: K52.9  ICD-9-CM: 558.9  4/18/2022 - Present Yes        Hyperkalemia ICD-10-CM: E87.5  ICD-9-CM: 276.7  4/18/2022 - Present Yes        Hematuria, unspecified ICD-10-CM: R31.9  ICD-9-CM: 599.70  4/18/2022 - Present Yes        Anemia ICD-10-CM: D64.9  ICD-9-CM: 285.9  4/18/2022 - Present Yes        Metabolic acidosis NZN-67-FQ: E87.2  ICD-9-CM: 276.2  4/18/2022 - Present Yes        Diastolic dysfunction (Chronic) ICD-10-CM: I51.89  ICD-9-CM: 429.9  4/18/2022 - Present Yes        Postsurgical hypothyroidism ICD-10-CM: E89.0  ICD-9-CM: 244.0  10/23/2019 - Present Yes        Controlled type 2 diabetes mellitus with diabetic neuropathy, without long-term current use of insulin (HCC) ICD-10-CM: E11.40  ICD-9-CM: 250.60, 357.2  5/15/2019 - Present Yes        Mixed hyperlipidemia ICD-10-CM: E78.2  ICD-9-CM: 272.2  5/15/2019 - Present Yes        Coronary artery disease involving native coronary artery of native heart without angina pectoris ICD-10-CM: I25.10  ICD-9-CM: 414.01  5/15/2019 - Present Yes        Essential hypertension (Chronic) ICD-10-CM: I10  ICD-9-CM: 401.9  1/3/2018 - Present Yes              Past History:  Past Medical History:   Diagnosis Date    Anxiety     r/t difficulty swallowing    Arthritis     CAD (coronary artery disease)     stent placed 2003, Folowed by Dr. Mitzy Robles Chronic pain     Knees, hips, feet and neck     Diabetes ([facility]     type 2; oral meds, avg blood sugar-100-120, Last A1C 6.4 on 5/10/19, Hypoglycemic signs at 50    Dysphasia     Esophageal dilatation     x 2 times, last in 2-12     GERD (gastroesophageal reflux disease)     controlled with as needed medication    Gout     Hypercholesteremia     Daily meds     Hypertension     Managed with medication    Kidney stones     hx    Sleep apnea     Pt states history of and no longer uses c-pap machine     Stroke ([facility]     -TIA- no deficits     Unspecified adverse effect of anesthesia      Past Surgical History:   Procedure Laterality Date    COLONOSCOPY N/A 2017    COLONOSCOPY/ 34 performed by Boubacar Og MD at [address] 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    HX WISDOM TEETH EXTRACTION      age 50      Allergies   Allergen Reactions    Pcn [Penicillins] Rash      Social History     Tobacco Use    Smoking status: Former Smoker     Packs/day: 2.00     Years: 5.00     Pack years: 10.00     Types: Cigarettes     Quit date: 1980     Years since quittin.3    Smokeless tobacco: Never Used   Substance Use Topics    Alcohol use: No     Alcohol/week: 0.0 standard drinks      Family History   Problem Relation Age of Onset    Heart Disease Mother     Heart Disease Father     Stroke Father     Cancer Father         prostate cancer    No Known Problems Maternal Grandmother     No Known Problems Maternal Grandfather     No Known Problems Paternal Grandmother     No Known Problems Paternal Grandfather     Colon Cancer Brother     Prostate Cancer Brother     Heart Attack Paternal Uncle     Colon Cancer Brother     Thyroid Disease Daughter       Family history reviewed and negative except as noted above. Immunization History   Administered Date(s) Administered    COVID-19, Moderna, Primary or Immunocompromised Series, MRNA, PF, 100mcg/0.5mL 12/22/2021, 01/21/2022    Influenza High Dose Vaccine PF 11/16/2016, 11/15/2018, 12/22/2021    Influenza Vaccine (Tri) Adjuvanted (>65 Yrs FLUAD TRI 32148) 11/14/2019    Pneumococcal Conjugate (PCV-13) 04/26/2017    Pneumococcal Polysaccharide (PPSV-23) 11/15/2018    Td 01/01/2008    Zoster Recombinant 11/15/2018     Prior to Admit Medications:  Current Outpatient Medications   Medication Instructions    allopurinoL (ZYLOPRIM) 300 mg, Oral, DAILY    aspirin delayed-release 81 mg, Oral, DAILY    benazepriL (LOTENSIN) 40 mg, Oral, DAILY    Blood-Glucose Meter monitoring kit Use kit for monitoring diabetes (e11.9)    cetirizine (ZYRTEC) 10 mg tablet No dose, route, or frequency recorded.  clotrimazole-betamethasone (LOTRISONE) topical cream Topical, 2 TIMES DAILY    eplerenone (INSPRA) 50 mg, Oral, DAILY    eszopiclone (LUNESTA) 3 mg, Oral, EVERY BEDTIME    furosemide (LASIX) 20 mg tablet No dose, route, or frequency recorded.     glucose blood VI test strips (blood glucose test) strip Check glucose once daily for E11.9    lancets misc Use once daily for E11.9    Lancing Device (Lancing Device with Lancets) misc Use daily for E11.9    levothyroxine (SYNTHROID) 112 mcg, Oral, DAILY BEFORE BREAKFAST    meloxicam (MOBIC) 15 mg, Oral, DAILY AS NEEDED    metFORMIN (GLUCOPHAGE) 500 mg, Oral, 2 TIMES DAILY WITH MEALS    methocarbamoL (ROBAXIN) 500 mg, Oral, BEDTIME PRN    metoprolol succinate (TOPROL-XL) 100 mg, Oral, DAILY    nitroglycerin (NITROSTAT) 0.4 mg, SubLINGual, EVERY 5 MIN AS NEEDED    rosuvastatin (CRESTOR) 20 mg, Oral, EVERY BEDTIME       Objective:     Patient Vitals for the past 24 hrs:   Temp Pulse Resp BP SpO2   04/18/22 1740 -- 90 15 115/61 100 % 04/18/22 1710 -- 90 19 110/64 100 %   04/18/22 1700 -- 91 16 105/61 100 %   04/18/22 1630 -- 87 15 (!) 113/59 100 %   04/18/22 1619 -- 98 -- 104/67 --   04/18/22 1556 -- 95 12 (!) 99/53 100 %   04/18/22 1526 -- 84 21 (!) 108/52 100 %   04/18/22 1507 -- -- -- 114/78 --   04/18/22 1505 -- -- -- (!) 102/90 --   04/18/22 1440 -- 82 15 (!) 94/51 100 %   04/18/22 1400 -- 82 14 (!) 101/49 --   04/18/22 1306 98.8 °F (37.1 °C) 88 18 (!) 92/58 100 %     Oxygen Therapy  O2 Sat (%): 100 % (04/18/22 1740)  Pulse via Oximetry: 91 beats per minute (04/18/22 1740)  O2 Device: None (Room air) (04/18/22 1306)    Estimated body mass index is 29.8 kg/m² as calculated from the following:    Height as of this encounter: 5' 8\" (1.727 m). Weight as of this encounter: 88.9 kg (196 lb). Intake/Output Summary (Last 24 hours) at 4/18/2022 1844  Last data filed at 4/18/2022 1505  Gross per 24 hour   Intake 1999 ml   Output --   Net 1999 ml         Physical Exam:    Blood pressure 115/61, pulse 90, temperature 98.8 °F (37.1 °C), resp. rate 15, height 5' 8\" (1.727 m), weight 88.9 kg (196 lb), SpO2 100 %. General:    Well nourished. No overt distress, elderly  Head:  Normocephalic, atraumatic  Eyes:  Sclerae appear normal.  Pupils equally round. ENT:  Nares appear normal, no drainage. Dry  oral mucosa  Neck:  No restricted ROM. Trachea midline   CV:   RRR. No m/r/g. No jugular venous distension. Lungs:   CTAB. No wheezing, rhonchi, or rales. Respirations even, unlabored  Abdomen: Bowel sounds present. Soft, nontender, nondistended. Extremities: No cyanosis or clubbing. No edema  Skin:     No rashes and normal coloration. Warm and dry. Neuro:   grossly intact. Psych:  Normal mood and affect.       I have reviewed ordered lab tests and independently visualized imaging below:    Last 24hr Labs:  Recent Results (from the past 24 hour(s))   CBC WITH AUTOMATED DIFF    Collection Time: 04/18/22  1:23 PM   Result Value Ref Range    WBC 10.8 4.3 - 11.1 K/uL    RBC 4.27 4.23 - 5.6 M/uL    HGB 13.4 (L) 13.6 - 17.2 g/dL    HCT 40.1 (L) 41.1 - 50.3 %    MCV 93.9 79.6 - 97.8 FL    MCH 31.4 26.1 - 32.9 PG    MCHC 33.4 31.4 - 35.0 g/dL    RDW 14.8 (H) 11.9 - 14.6 %    PLATELET 999 266 - 910 K/uL    MPV 11.8 9.4 - 12.3 FL    ABSOLUTE NRBC 0.00 0.0 - 0.2 K/uL    DF AUTOMATED      NEUTROPHILS 64 43 - 78 %    LYMPHOCYTES 26 13 - 44 %    MONOCYTES 9 4.0 - 12.0 %    EOSINOPHILS 0 (L) 0.5 - 7.8 %    BASOPHILS 1 0.0 - 2.0 %    IMMATURE GRANULOCYTES 0 0.0 - 5.0 %    ABS. NEUTROPHILS 6.9 1.7 - 8.2 K/UL    ABS. LYMPHOCYTES 2.8 0.5 - 4.6 K/UL    ABS. MONOCYTES 1.0 0.1 - 1.3 K/UL    ABS. EOSINOPHILS 0.0 0.0 - 0.8 K/UL    ABS. BASOPHILS 0.1 0.0 - 0.2 K/UL    ABS. IMM. GRANS. 0.0 0.0 - 0.5 K/UL   METABOLIC PANEL, COMPREHENSIVE    Collection Time: 04/18/22  1:23 PM   Result Value Ref Range    Sodium 137 136 - 145 mmol/L    Potassium 5.2 (H) 3.5 - 5.1 mmol/L    Chloride 108 (H) 98 - 107 mmol/L    CO2 14 (L) 21 - 32 mmol/L    Anion gap 15 7 - 16 mmol/L    Glucose 126 (H) 65 - 100 mg/dL     (H) 8 - 23 MG/DL    Creatinine 7.70 (H) 0.8 - 1.5 MG/DL    GFR est AA 9 (L) >60 ml/min/1.73m2    GFR est non-AA 7 (L) >60 ml/min/1.73m2    Calcium 9.3 8.3 - 10.4 MG/DL    Bilirubin, total 0.4 0.2 - 1.1 MG/DL    ALT (SGPT) 27 12 - 65 U/L    AST (SGOT) 13 (L) 15 - 37 U/L    Alk.  phosphatase 73 50 - 136 U/L    Protein, total 7.4 6.3 - 8.2 g/dL    Albumin 4.0 3.2 - 4.6 g/dL    Globulin 3.4 2.3 - 3.5 g/dL    A-G Ratio 1.2 1.2 - 3.5     MAGNESIUM    Collection Time: 04/18/22  1:23 PM   Result Value Ref Range    Magnesium 2.2 1.8 - 2.4 mg/dL   LIPASE    Collection Time: 04/18/22  1:23 PM   Result Value Ref Range    Lipase 565 (H) 73 - 393 U/L   POC URINE MACROSCOPIC    Collection Time: 04/18/22  3:11 PM   Result Value Ref Range    Spec. gravity (POC) >1.030 (H) 1.001 - 1.023    pH, urine  (POC) 5.0 5.0 - 9.0      Protein (POC) 30 (A) NEG mg/dL    Glucose, urine (POC) Negative NEG mg/dL    Ketones (POC) TRACE (A) NEG mg/dL    Bilirubin (POC) MODERATE (A) NEG      Blood (POC) LARGE (A) NEG      Urobilinogen (POC) 0.2 0.2 - 1.0 EU/dL    Nitrite (POC) Negative NEG      Leukocyte esterase (POC) TRACE (A) NEG      Performed by Sylvie Hwang MICROSCOPIC    Collection Time: 04/18/22  3:17 PM   Result Value Ref Range    WBC 10-20 0 /hpf    RBC 5-10 0 /hpf    Epithelial cells 0-3 0 /hpf    Bacteria 0 0 /hpf    Casts 10-20 0 /lpf       All Micro Results     Procedure Component Value Units Date/Time    CULTURE, URINE [[id number]] Collected: 04/18/22 1517    Order Status: Completed Specimen: Urine from Clean catch Updated: 04/18/22 1823          Other Studies:  CT ABD PELV WO CONT    Result Date: 4/18/2022  CT abdomen and pelvis without contrast CLINICAL INDICATION: One week of worsening severe vomiting, multiple falls, pain involving both flanks and the low back, dizziness and shortness of breath. Follow-up of left hydronephrosis and ureteral stone. History also includes hypertension, coronary artery disease, GERD, high cholesterol, diabetes type 2, gout, arthritis, rectal polyps. COMPARISON: August 22, 2012 TECHNIQUE: Dose reduction technique used: Automated exposure Control and/or adjustment of mA and kV according to patient size. Multiple contiguous axial CT images were obtained through the abdomen and pelvis without intravenous or oral contrast. Coronal reformatted images are obtained to further evaluate organs. FINDINGS: Partially included lung bases demonstrate no consolidation or effusion. There are tiny peripheral areas of pleural thickening and/or tiny nodules along the periphery of both bases, measuring 3 mm or less. There are partially visualized coronary artery and aortic calcifications, as well as bilateral gynecomastia. Abdomen: Previous left hydronephrosis and ureteral stones have resolved.  There is a right posterior collecting system renal stone measuring about 1.4 cm, not significantly changed. Gallbladder has been removed in the interval, without evidence of surrounding inflammation or biliary dilatation. There is no hydronephrosis. There is no evidence of renal mass on limited non-contrast evaluation. No discrete lesions are seen in the noncontrasted visualized portions of the liver or spleen. No suspicious adrenal or pancreas lesions. No free air. No focal inflammatory changes or fluid collections. Aorta normal caliber. Diverticulosis is noted of the large bowel. There are questionable scattered areas of wall thickening in the large bowel versus underdistention artifact. No evidence of bowel obstruction, hernia, or appendicitis. GI evaluation is limited given no oral contrast. Pelvis CT: Urinary bladder is decompressed, with nonspecific wall thickening that could be underdistention artifact or cystitis. Prostate is borderline enlarged. There are no distal ureteral or bladder calculi. No focal inflammatory changes or fluid collections in the pelvis. No evidence of lymphadenopathy. Bones: No acute osseous lesion. 1. No hydronephrosis or ureteral calculus. 2. Chronic nonobstructing right renal stone. 3. Cholecystectomy.        Medications Administered     cefTRIAXone (ROCEPHIN) 1 g in 0.9% sodium chloride (MBP/ADV) 50 mL MBP     Admin Date  04/18/2022 Action  New Bag Dose  1 g Rate  100 mL/hr Route  IntraVENous Administered By  Graciela Negro RN          ondansetron [facility]) injection 4 mg     Admin Date  04/18/2022 Action  Given Dose  4 mg Route  IntraVENous Administered By  Juarez Nino RN          sodium chloride 0.9 % bolus infusion 1,000 mL     Admin Date  04/18/2022 Action  New Bag Dose  1,000 mL Rate  1,000 mL/hr Route  IntraVENous Administered By  Juarez Nino RN           Admin Date  04/18/2022 Action  New Bag Dose  1,000 mL Rate  1,000 mL/hr Route  IntraVENous Administered By  Graciela Negro RN                Signed:  Jacqueline Matthews MD    Part of this note may have been written by using a voice dictation software. The note has been proof read but may still contain some grammatical/other typographical errors.

## 2022-04-18 NOTE — ED NOTES
Manager Desi Wade called to lab to inquire about in process specimens that have not yet resulted.  Per lab they are processing patients labs now

## 2022-04-18 NOTE — TELEPHONE ENCOUNTER
Received call from [facility] at [facility] with [employer]. Subjective: Caller states \"dehydrated\"     Current Symptoms: Vomiting and diarrhea;  Daughter with Covid  a couple days ago;  Vomiting black and black stools; Abdominal pain even to the touch;  States has passed out multiple times in the past week; Onset: 1 week ago; worsening    Associated Symptoms: reduced activity, reduced appetite, increased wakefulness    Pain Severity:  \"Pain even to the touch\"; constant    Temperature: has not taken his temp; but stays covered in several blankets; does not have a thermometer; What has been tried: Emitrol for vomiting    Recommended disposition: Call  Now    Care advice provided, patient verbalizes understanding; denies any other questions or concerns; instructed to call back for any new or worsening symptoms. Patient/caller agrees to calling [phone] Attempted to call patient back for follow up on call to 911. There was no answer. Message left on identified voicemail; Attention Provider: Thank you for allowing me to participate in the care of your patient. The patient was connected to triage in response to information provided to the [facility]. Please do not respond through this encounter as the response is not directed to a shared pool.       Reason for Disposition   Passed out (i.e., fainted, collapsed and was not responding)    Protocols used: ABDOMINAL PAIN - MALE-ADULT-[place]

## 2022-04-18 NOTE — ED PROVIDER NOTES
53-year-old white male history of diabetes, coronary artery disease, hypertension, CVA and previous cholecystectomy presents with nausea vomiting and diarrhea for the past week. Does report some \"soreness\" to the right side of his abdomen. The nausea and vomiting is aggravated by attempting intake. No blood in his stool or emesis. No definite fever. Has tried over-the-counter Emetrol with minimal improvement    The history is provided by the patient. Vomiting   Associated symptoms include abdominal pain and diarrhea. Pertinent negatives include no fever, no headaches, no cough and no headaches.         Past Medical History:   Diagnosis Date    Anxiety     r/t difficulty swallowing    Arthritis     CAD (coronary artery disease)     stent placed 2003, Folowed by Dr. Praveena Olguin Chronic pain     Knees, hips, feet and neck     Diabetes ([facility] [address] 2003    type 2; oral meds, avg blood sugar-100-120, Last A1C 6.4 on 5/10/19, Hypoglycemic signs at 50    Dysphasia     Esophageal dilatation     x 2 times, last in 2-12     GERD (gastroesophageal reflux disease)     controlled with as needed medication    Gout     Hypercholesteremia     Daily meds     Hypertension     Managed with medication    Kidney stones     hx    Sleep apnea     Pt states history of and no longer uses c-pap machine     Stroke ([facility] [address]     2003-TIA- no deficits     Unspecified adverse effect of anesthesia        Past Surgical History:   Procedure Laterality Date    COLONOSCOPY N/A 9/8/2017    COLONOSCOPY/ 34 performed by Wilda Chappell MD at [facility] HX COLONOSCOPY  2016    repeat in 2019    HX HEART CATHETERIZATION  2003    One stent     HX OTHER SURGICAL      rectal polyps removed    HX OTHER SURGICAL  2010    esophageal dilation with biopsy    HX THYROIDECTOMY  07/30/2019    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    HX WISDOM TEETH EXTRACTION      age 50         Family History:   Problem Relation Age of Onset    Heart Disease Mother     Heart Disease Father     Stroke Father     Cancer Father         prostate cancer    No Known Problems Maternal Grandmother     No Known Problems Maternal Grandfather     No Known Problems Paternal Grandmother     No Known Problems Paternal Grandfather     Colon Cancer Brother     Prostate Cancer Brother     Heart Attack Paternal Uncle     Colon Cancer Brother     Thyroid Disease Daughter        Social History     Socioeconomic History    Marital status:      Spouse name: Not on file    Number of children: Not on file    Years of education: Not on file    Highest education level: Not on file   Occupational History    Not on file   Tobacco Use    Smoking status: Former Smoker     Packs/day: 2.00     Years: 5.00     Pack years: 10.00     Types: Cigarettes     Quit date: 1980     Years since quittin.3    Smokeless tobacco: Never Used   Vaping Use    Vaping Use: Never used   Substance and Sexual Activity    Alcohol use: No     Alcohol/week: 0.0 standard drinks    Drug use: No    Sexual activity: Not Currently   Other Topics Concern    Not on file   Social History Narrative    Not on file     Social Determinants of Health     Financial Resource Strain:     Difficulty of Paying Living Expenses: Not on file   Food Insecurity:     Worried About Running Out of Food in the Last Year: Not on file    Luis Miguel of Food in the Last Year: Not on file   Transportation Needs:     Lack of Transportation (Medical): Not on file    Lack of Transportation (Non-Medical):  Not on file   Physical Activity:     Days of Exercise per Week: Not on file    Minutes of Exercise per Session: Not on file   Stress:     Feeling of Stress : Not on file   Social Connections:     Frequency of Communication with Friends and Family: Not on file    Frequency of Social Gatherings with Friends and Family: Not on file    Attends Jehovah's witness Services: Not on file   [employer] of Clubs or Organizations: Not on file    Attends Club or Organization Meetings: Not on file    Marital Status: Not on file   Intimate Partner Violence:     Fear of Current or Ex-Partner: Not on file    Emotionally Abused: Not on file    Physically Abused: Not on file    Sexually Abused: Not on file   Housing Stability:     Unable to Pay for Housing in the Last Year: Not on file    Number of [place] in the Last Year: Not on file    Unstable Housing in the Last Year: Not on file         ALLERGIES: Pcn [penicillins]    Review of Systems   Constitutional: Negative for fever. HENT: Negative for congestion. Respiratory: Negative for cough and shortness of breath. Cardiovascular: Negative for chest pain. Gastrointestinal: Positive for abdominal pain, diarrhea, nausea and vomiting. Genitourinary: Negative for dysuria. Musculoskeletal: Negative for back pain and neck pain. Skin: Negative for rash. Neurological: Negative for headaches. All other systems reviewed and are negative. Vitals:    04/18/22 1306   BP: (!) 92/58   Pulse: 88   Resp: 18   Temp: 98.8 °F (37.1 °C)   SpO2: 100%   Weight: 88.9 kg (196 lb)   Height: 5' 8\" (1.727 m)            Physical Exam  Vitals and nursing note reviewed. Constitutional:       General: He is not in acute distress. Appearance: Normal appearance. He is not toxic-appearing. HENT:      Head: Normocephalic and atraumatic. Nose: Nose normal.      Mouth/Throat:      Mouth: Mucous membranes are moist.      Pharynx: Oropharynx is clear. Eyes:      Conjunctiva/sclera: Conjunctivae normal.      Pupils: Pupils are equal, round, and reactive to light. Cardiovascular:      Rate and Rhythm: Normal rate and regular rhythm. Heart sounds: No murmur heard. Pulmonary:      Effort: Pulmonary effort is normal.      Breath sounds: Normal breath sounds. Abdominal:      Palpations: Abdomen is soft. Tenderness:  There is abdominal tenderness in the right lower quadrant. There is no guarding or rebound. Musculoskeletal:         General: No swelling. Normal range of motion. Cervical back: Normal range of motion and neck supple. Skin:     General: Skin is warm and dry. Neurological:      Mental Status: He is alert and oriented to person, place, and time. Psychiatric:         Mood and Affect: Mood normal.         Behavior: Behavior normal.          MDM  Number of Diagnoses or Management Options  Diagnosis management comments: Blood work shows acute renal failure with creatinine 7.7, . Potassium is 5.2. Urinalysis has 10-20 white cells but no bacteria. Culture is pending. Bedside ultrasound shows no urinary retention. CT scan shows no acute abnormality. Patient is being treated with IV fluids. Rocephin for possible UTI.   Hospitalist consulted for admission       Amount and/or Complexity of Data Reviewed  Clinical lab tests: ordered and reviewed  Tests in the radiology section of CPT®: ordered and reviewed  Discuss the patient with other providers: yes  Independent visualization of images, tracings, or specimens: yes    Risk of Complications, Morbidity, and/or Mortality  Presenting problems: moderate  Diagnostic procedures: moderate  Management options: moderate           Procedures

## 2022-04-18 NOTE — ED TRIAGE NOTES
Pt reports to ED via EMS w c/c of vomiting for 1 week. Reports falling several times over the past week. Reports px in shoulders/back/lower left leg. Reports dizziness, sob. Pt denies fever, chills.

## 2022-04-18 NOTE — ED NOTES
Patient stood at bedside with this rn to use urinal. Patient able to stand, denies dizziness at this time

## 2022-04-19 LAB
ANION GAP SERPL CALC-SCNC: 11 MMOL/L (ref 7–16)
ANION GAP SERPL CALC-SCNC: 13 MMOL/L (ref 7–16)
ATRIAL RATE: 89 BPM
BASOPHILS # BLD: 0.1 K/UL (ref 0–0.2)
BASOPHILS NFR BLD: 1 % (ref 0–2)
BUN SERPL-MCNC: 100 MG/DL (ref 8–23)
BUN SERPL-MCNC: 101 MG/DL (ref 8–23)
CALCIUM SERPL-MCNC: 8.2 MG/DL (ref 8.3–10.4)
CALCIUM SERPL-MCNC: 8.3 MG/DL (ref 8.3–10.4)
CALCULATED P AXIS, ECG09: 61 DEGREES
CALCULATED R AXIS, ECG10: 62 DEGREES
CALCULATED T AXIS, ECG11: 53 DEGREES
CHLORIDE SERPL-SCNC: 117 MMOL/L (ref 98–107)
CHLORIDE SERPL-SCNC: 119 MMOL/L (ref 98–107)
CK SERPL-CCNC: 62 U/L (ref 21–215)
CO2 SERPL-SCNC: 12 MMOL/L (ref 21–32)
CO2 SERPL-SCNC: 14 MMOL/L (ref 21–32)
CREAT SERPL-MCNC: 4 MG/DL (ref 0.8–1.5)
CREAT SERPL-MCNC: 4.7 MG/DL (ref 0.8–1.5)
CREAT UR-MCNC: 112 MG/DL
CREAT UR-MCNC: 115 MG/DL
DIAGNOSIS, 93000: NORMAL
DIFFERENTIAL METHOD BLD: ABNORMAL
EOSINOPHIL # BLD: 0.1 K/UL (ref 0–0.8)
EOSINOPHIL NFR BLD: 1 % (ref 0.5–7.8)
ERYTHROCYTE [DISTWIDTH] IN BLOOD BY AUTOMATED COUNT: 14.6 % (ref 11.9–14.6)
GLUCOSE BLD STRIP.AUTO-MCNC: 106 MG/DL (ref 65–100)
GLUCOSE BLD STRIP.AUTO-MCNC: 109 MG/DL (ref 65–100)
GLUCOSE BLD STRIP.AUTO-MCNC: 125 MG/DL (ref 65–100)
GLUCOSE BLD STRIP.AUTO-MCNC: 79 MG/DL (ref 65–100)
GLUCOSE SERPL-MCNC: 75 MG/DL (ref 65–100)
GLUCOSE SERPL-MCNC: 85 MG/DL (ref 65–100)
HCT VFR BLD AUTO: 34.9 % (ref 41.1–50.3)
HGB BLD-MCNC: 11.6 G/DL (ref 13.6–17.2)
IMM GRANULOCYTES # BLD AUTO: 0 K/UL (ref 0–0.5)
IMM GRANULOCYTES NFR BLD AUTO: 0 % (ref 0–5)
LACTATE SERPL-SCNC: 1 MMOL/L (ref 0.4–2)
LYMPHOCYTES # BLD: 2.1 K/UL (ref 0.5–4.6)
LYMPHOCYTES NFR BLD: 26 % (ref 13–44)
MCH RBC QN AUTO: 31.1 PG (ref 26.1–32.9)
MCHC RBC AUTO-ENTMCNC: 33.2 G/DL (ref 31.4–35)
MCV RBC AUTO: 93.6 FL (ref 79.6–97.8)
MM INDURATION POC: 0 MM (ref 0–5)
MONOCYTES # BLD: 0.7 K/UL (ref 0.1–1.3)
MONOCYTES NFR BLD: 8 % (ref 4–12)
NEUTS SEG # BLD: 5.3 K/UL (ref 1.7–8.2)
NEUTS SEG NFR BLD: 65 % (ref 43–78)
NRBC # BLD: 0 K/UL (ref 0–0.2)
P-R INTERVAL, ECG05: 172 MS
PHOSPHATE SERPL-MCNC: 4.7 MG/DL (ref 2.3–3.7)
PLATELET # BLD AUTO: 150 K/UL (ref 150–450)
PMV BLD AUTO: 12.4 FL (ref 9.4–12.3)
POTASSIUM SERPL-SCNC: 5 MMOL/L (ref 3.5–5.1)
POTASSIUM SERPL-SCNC: 5.1 MMOL/L (ref 3.5–5.1)
PPD POC: NEGATIVE NEGATIVE
PROT UR-MCNC: 26 MG/DL
PROT/CREAT UR-RTO: 0.2
Q-T INTERVAL, ECG07: 370 MS
QRS DURATION, ECG06: 100 MS
QTC CALCULATION (BEZET), ECG08: 450 MS
RBC # BLD AUTO: 3.73 M/UL (ref 4.23–5.6)
SARS-COV-2, COV2: NORMAL
SARS-COV-2, COV2: NOT DETECTED
SERVICE CMNT-IMP: ABNORMAL
SERVICE CMNT-IMP: NORMAL
SODIUM SERPL-SCNC: 142 MMOL/L (ref 136–145)
SODIUM SERPL-SCNC: 144 MMOL/L (ref 136–145)
SODIUM UR-SCNC: 31 MMOL/L
SPECIMEN SOURCE, FCOV2M: NORMAL
VENTRICULAR RATE, ECG03: 89 BPM
WBC # BLD AUTO: 8.2 K/UL (ref 4.3–11.1)

## 2022-04-19 PROCEDURE — 82570 ASSAY OF URINE CREATININE: CPT

## 2022-04-19 PROCEDURE — 97165 OT EVAL LOW COMPLEX 30 MIN: CPT

## 2022-04-19 PROCEDURE — 83605 ASSAY OF LACTIC ACID: CPT

## 2022-04-19 PROCEDURE — 82550 ASSAY OF CK (CPK): CPT

## 2022-04-19 PROCEDURE — 83521 IG LIGHT CHAINS FREE EACH: CPT

## 2022-04-19 PROCEDURE — 74011250637 HC RX REV CODE- 250/637: Performed by: INTERNAL MEDICINE

## 2022-04-19 PROCEDURE — 81015 MICROSCOPIC EXAM OF URINE: CPT

## 2022-04-19 PROCEDURE — 74011250636 HC RX REV CODE- 250/636: Performed by: INTERNAL MEDICINE

## 2022-04-19 PROCEDURE — 82962 GLUCOSE BLOOD TEST: CPT

## 2022-04-19 PROCEDURE — 82784 ASSAY IGA/IGD/IGG/IGM EACH: CPT

## 2022-04-19 PROCEDURE — 84100 ASSAY OF PHOSPHORUS: CPT

## 2022-04-19 PROCEDURE — 85025 COMPLETE CBC W/AUTO DIFF WBC: CPT

## 2022-04-19 PROCEDURE — 65270000046 HC RM TELEMETRY

## 2022-04-19 PROCEDURE — 80048 BASIC METABOLIC PNL TOTAL CA: CPT

## 2022-04-19 PROCEDURE — U0005 INFEC AGEN DETEC AMPLI PROBE: HCPCS

## 2022-04-19 PROCEDURE — C9113 INJ PANTOPRAZOLE SODIUM, VIA: HCPCS | Performed by: INTERNAL MEDICINE

## 2022-04-19 PROCEDURE — 84300 ASSAY OF URINE SODIUM: CPT

## 2022-04-19 PROCEDURE — 97530 THERAPEUTIC ACTIVITIES: CPT

## 2022-04-19 PROCEDURE — 97161 PT EVAL LOW COMPLEX 20 MIN: CPT

## 2022-04-19 PROCEDURE — 81003 URINALYSIS AUTO W/O SCOPE: CPT

## 2022-04-19 PROCEDURE — 74011000250 HC RX REV CODE- 250: Performed by: INTERNAL MEDICINE

## 2022-04-19 PROCEDURE — 84156 ASSAY OF PROTEIN URINE: CPT

## 2022-04-19 PROCEDURE — 97535 SELF CARE MNGMENT TRAINING: CPT

## 2022-04-19 PROCEDURE — 74011000250 HC RX REV CODE- 250: Performed by: NURSE PRACTITIONER

## 2022-04-19 RX ORDER — BISACODYL 5 MG
20 TABLET, DELAYED RELEASE (ENTERIC COATED) ORAL
Status: COMPLETED | OUTPATIENT
Start: 2022-04-19 | End: 2022-04-19

## 2022-04-19 RX ORDER — SODIUM BICARBONATE 650 MG/1
650 TABLET ORAL 2 TIMES DAILY
Status: DISCONTINUED | OUTPATIENT
Start: 2022-04-19 | End: 2022-04-19

## 2022-04-19 RX ORDER — POLYETHYLENE GLYCOL 3350 17 G/17G
238 POWDER, FOR SOLUTION ORAL ONCE
Status: COMPLETED | OUTPATIENT
Start: 2022-04-19 | End: 2022-04-19

## 2022-04-19 RX ADMIN — SODIUM CHLORIDE, PRESERVATIVE FREE 10 ML: 5 INJECTION INTRAVENOUS at 21:24

## 2022-04-19 RX ADMIN — SODIUM CHLORIDE 40 MG: 9 INJECTION INTRAMUSCULAR; INTRAVENOUS; SUBCUTANEOUS at 08:57

## 2022-04-19 RX ADMIN — HEPARIN SODIUM 5000 UNITS: 5000 INJECTION INTRAVENOUS; SUBCUTANEOUS at 06:23

## 2022-04-19 RX ADMIN — SODIUM CHLORIDE 125 ML/HR: 900 INJECTION, SOLUTION INTRAVENOUS at 06:28

## 2022-04-19 RX ADMIN — Medication: at 11:00

## 2022-04-19 RX ADMIN — SODIUM CHLORIDE, PRESERVATIVE FREE 10 ML: 5 INJECTION INTRAVENOUS at 21:25

## 2022-04-19 RX ADMIN — SODIUM BICARBONATE 650 MG TABLET 650 MG: at 08:58

## 2022-04-19 RX ADMIN — ASPIRIN 81 MG: 81 TABLET ORAL at 08:56

## 2022-04-19 RX ADMIN — LEVOTHYROXINE SODIUM 112 MCG: 0.11 TABLET ORAL at 06:23

## 2022-04-19 RX ADMIN — SODIUM CHLORIDE, PRESERVATIVE FREE 10 ML: 5 INJECTION INTRAVENOUS at 18:08

## 2022-04-19 RX ADMIN — ROSUVASTATIN CALCIUM 20 MG: 20 TABLET, COATED ORAL at 21:24

## 2022-04-19 RX ADMIN — SODIUM CHLORIDE, PRESERVATIVE FREE 10 ML: 5 INJECTION INTRAVENOUS at 06:24

## 2022-04-19 RX ADMIN — BISACODYL 20 MG: 5 TABLET, COATED ORAL at 14:51

## 2022-04-19 RX ADMIN — Medication: at 23:36

## 2022-04-19 RX ADMIN — POLYETHYLENE GLYCOL 3350 238 G: 17 POWDER, FOR SOLUTION ORAL at 18:05

## 2022-04-19 NOTE — PROGRESS NOTES
TRANSFER - IN REPORT:    Verbal report received from ED, RN(name) on Veronica Jackson  being received from ED(unit) for routine progression of care      Report consisted of patients Situation, Background, Assessment and   Recommendations(SBAR). Information from the following report(s) SBAR was reviewed with the receiving nurse. Opportunity for questions and clarification was provided. Assessment completed upon patients arrival to unit and care assumed.

## 2022-04-19 NOTE — PROGRESS NOTES
Pt is alert and oriented times 3. Pt has no complaints of pain or discomfort. Pt is on room air with even respirations. Pt has NS infusing at 125 ml/h. Pt made aware to notify staff when wanting get up. Call light is in place, will continue to monitor.

## 2022-04-19 NOTE — PROGRESS NOTES
ACUTE PHYSICAL THERAPY GOALS:  (Developed with and agreed upon by patient and/or caregiver. )  LTG:  (1.)Mr. Sagar Heath will move from supine to sit and sit to supine  in bed with INDEPENDENCE within 7 treatment day(s). (2.)Mr. Sagar Heath will transfer from bed to chair and chair to bed with INDEPENDENCE using the least restrictive device within 7 treatment day(s). (3.)Mr. Sagar Heath will ambulate with MODIFIED INDEPENDENCE for 300 feet with the least restrictive device within 7 treatment day(s). (4.)Mr. Sagar Heath will perform standing static and dynamic balance activities x 25 minutes with SUPERVISION to improve safety within 7 treatment day(s). (5.)Mr. Sagar Heath will ambulate and/or perform functional activities for  25 consecutive minutes with stable vital signs and no rests required to improve activity tolerance within 7 treatment days.   ________________________________________________________________________________________________        PHYSICAL THERAPY ASSESSMENT: Initial Assessment, Daily Note and AM PT Treatment Day # 1      Jones Samuel is a 78 y.o. male   PRIMARY DIAGNOSIS: RITA (acute kidney injury) ([facility] [address]  RITA (acute kidney injury) ([facility] [address] [N17.9]       Reason for Referral:    ICD-10: Treatment Diagnosis: Generalized Muscle Weakness (M62.81)  Repeated Falls (R29.6)  INPATIENT: Payor: [employer] [place] [employer]ARE / Plan: [employer] [place] MEDICARE HMO/PPO / Product Type: Managed Care Medicare /     ASSESSMENT:     REHAB RECOMMENDATIONS:   Recommendation to date pending progress:  [address] [place] [address]  Equipment:    To Be Determined     PRIOR LEVEL OF FUNCTION:  (Prior to Hospitalization) INITIAL/CURRENT LEVEL OF FUNCTION:  (Most Recently Demonstrated)   Bed Mobility:   Independent  Sit to Stand:   Independent  Transfers:   Independent  Gait/Mobility:   Independent to Mod I with RW recently due to falls Bed Mobility:  Jamari Alvarez  Sit to Stand:  [employer] Assistance  Transfers:   Contact Guard Assistance to [address] [place] [address] for [employer] management   Gait/Mobility:  [employer] Assistance without RW     ASSESSMENT:  Mr. Sagar Heath is a 78year old M who presents admitted to hospital with c/o generalized weakness, nausea, vomiting and diarrhea. Pt recently lost his daughter to covid last week, but in good spirits this AM. Pt lives alone with a \"roommate\" who in separate portion of the home. Pt lives in 2 level home but remains on first level. Pt reports PLOF I with all ADLs and gait, until about a week ago when he got sick and began falling a lot due to dizziness. Pt since then has been using a RW he has. This date pt performs mobility including supine>sit with SBA and STS with SBA. Pt required CGA/Min A for gait to transfer bed>bathroom with RW due to safety with AD use. Pt then transferred to sink with RW and demonstrated fair balance with out RW and ambulated to chair without AD and was only requiring CGA. Pt obviously does not use RW very well and might have been a contributing factor to his falls. Pt presents as functioning below his baseline, with deficits in mobility including transfers, gait, balance, and activity tolerance. Pt will benefit from skilled therapy services to address stated deficits to promote return to highest level of function, independence, and safety. Pt will benefit from HHPT at d/c from hospital. Will continue to follow. SUBJECTIVE:   Mr. Sagar Heath states, \"Im not dizzy no more\"    SOCIAL HISTORY/LIVING ENVIRONMENT: lives alone in 2 level home, no [employer], lives on 1st floor. and has a roommate that lives in separate area Pt has RW he has recently been using due to frequent falls from being sick/dizzy. PLOF I with ADLs.    Home Environment: Apartment  One/Two Story Residence: Two story, live on 1st floor  Living Alone: No  Support Systems: Other Family Member(s)  OBJECTIVE:     PAIN: VITAL SIGNS: LINES/DRAINS:   Pre Treatment:    Post Treatment: 0 IV  O2 Device: None (Room air)     GROSS EVALUATION:  B LE Within Functional Limits Abnormal/ Functional Abnormal/ Non-Functional (see comments) Not Tested Comments:   AROM [x] [] [] []    PROM [] [] [] []    Strength [] [x] [] [] Generalized weakness   Balance [] [x] [] [] Fair standing balance   Posture [] [] [] []    Sensation [] [] [] []    Coordination [] [] [] []    Tone [] [] [] []    Edema [] [] [] []    Activity Tolerance [] [x] [] []     [] [] [] []      COGNITION/  PERCEPTION: Intact Impaired   (see comments) Comments:   Orientation [x] []    Vision [x] []    Hearing [x] []    Command Following [x] []    Safety Awareness [x] [x] A lot of tactile/manual/verbal cues for RW safety use.    [] []      MOBILITY: I Mod I S SBA CGA Min Mod Max Total  NT x2 Comments:   Bed Mobility    Rolling [] [] [] [] [] [] [] [] [] [x] []    Supine to Sit [] [] [] [x] [] [] [] [] [] [] []    Scooting [] [] [] [x] [] [] [] [] [] [] []    Sit to Supine [] [] [] [] [] [] [] [] [] [x] []    Transfers    Sit to Stand [] [] [] [x] [] [] [] [] [] [] []    Bed to Chair [] [] [] [] [x] [] [] [] [] [] []    Stand to Sit [] [] [] [x] [] [] [] [] [] [] []    I=Independent, Mod I=Modified Independent, S=Supervision, SBA=Standby Assistance, CGA=Contact Guard Assistance,   Min=Minimal Assistance, Mod=Moderate Assistance, Max=Maximal Assistance, Total=Total Assistance, NT=Not Tested  GAIT: I Mod I S SBA CGA Min Mod Max Total  NT x2 Comments:   Level of Assistance [] [] [] [] [x] [x] [] [] [] [] []    Distance 8ft to bathroom with RW and 6ft to chair no AD    DME Rolling Walker and None    Gait Quality Carries RW with him and poor safety management;  Shuffled, but more steady without AD    Weightbearing Status N/A     I=Independent, Mod I=Modified Independent, S=Supervision, SBA=Standby Assistance, CGA=Contact Guard Assistance,   Min=Minimal Assistance, Mod=Moderate Assistance, Max=Maximal Assistance, Total=Total Assistance, NT=Not Tested    [facility] Mobility Inpatient Short Form       How much difficulty does the patient currently have. .. Unable A Lot A Little None   1. Turning over in bed (including adjusting bedclothes, sheets and blankets)? [] 1   [] 2   [] 3   [x] 4   2. Sitting down on and standing up from a chair with arms ( e.g., wheelchair, bedside commode, etc.)   [] 1   [] 2   [] 3   [x] 4   3. Moving from lying on back to sitting on the side of the bed? [] 1   [] 2   [] 3   [x] 4   How much help from another person does the patient currently need. .. Total A Lot A Little None   4. Moving to and from a bed to a chair (including a wheelchair)? [] 1   [] 2   [x] 3   [] 4   5. Need to walk in hospital room? [] 1   [] 2   [x] 3   [] 4   6. Climbing 3-5 steps with a railing? [] 1   [] 2   [x] 3   [] 4   © 2007, Trustees of [address] [place] [address] [address] [id number], under license to [employer]. All rights reserved     Score:  Initial: 21 Most Recent: X (Date: -- )    Interpretation of Tool:  Represents activities that are increasingly more difficult (i.e. Bed mobility, Transfers, Gait). PLAN:   FREQUENCY/DURATION: PT Plan of Care: 3 times/week for duration of hospital stay or until stated goals are met, whichever comes first.    PROBLEM LIST:   (Skilled intervention is medically necessary to address:)  1. Decreased ADL/Functional Activities  2. Decreased Activity Tolerance  3. Decreased Balance  4. Decreased Gait Ability  5. Decreased Strength  6. Decreased Transfer Abilities   INTERVENTIONS PLANNED:   (Benefits and precautions of physical therapy have been discussed with the patient.)  1. Therapeutic Activity  2. Therapeutic Exercise/HEP  3. Neuromuscular Re-education  4. Gait Training  5.  Education     TREATMENT:     EVALUATION: Low Complexity : (Untimed Charge)    TREATMENT:   ($$ Therapeutic Activity: 8-22 mins    )  Co-Treatment PT/OT necessary due to patient's decreased overall endurance/tolerance levels, as well as need for high level skilled assistance to complete functional transfers/mobility and functional tasks  Therapeutic Activity (15 Minutes): Therapeutic activity included Supine to Sit, Scooting, Transfer Training, Ambulation on level ground, Sitting balance  and Standing balance to improve functional Mobility, Strength and Activity tolerance.     TREATMENT GRID:  N/A    AFTER TREATMENT POSITION/PRECAUTIONS:  Alarm Activated, Chair, Needs within reach and RN notified    INTERDISCIPLINARY COLLABORATION:  RN/PCT, PT/PTA and OT/ETIENNE    TOTAL TREATMENT DURATION:  PT Patient Time In/Time Out  Time In: 0178  Time Out: [address]

## 2022-04-19 NOTE — CONSULTS
Nephrology consult    Admission Date:  4/18/2022    Admission Diagnosis  RITA (acute kidney injury) ([facility] [address] [N17.9]    We are asked by Dr. Clement Hebert    History of Present Illness: Mr. Manan Jones is a 78 y.o male with PMH significant for arthritis, CAD, DM type II, dysphagia s/p esophageal dilatation, gout, HLD, KARI, TIA and kidney stones reportedly admitted with complaints of generalized weakness, nausea, vomiting and diarrhea with decrease in PO intake and uop. Non contrast CT pelvis revealed right renal non obstructing stone, cholecystectomy, otherwise negative for acute process. Lipase 565. We are consulted for RITA. From a renal standpoint his Cr/BUN on admission was 7.70/114- has been on IVF and Cr/BUN trending down 4.70/101->4.00/100, CO2 14->12, noted episodes of hypotension since admission. Home meds significant for metformin, allopurinol, mobic, lasix. Pt seen and examined in room complaints of abdominal tenderness with N/V/D, coffee ground emesis and dark loose stools, decrease in PO intake and uop past week since GI symptoms started. He states that he has had multiple falls with dizziness and lightheadedness for the past week. Pt lost his daughter Friday 4/15 Venu Haney at [facility] from a 6 month hospitalization course originally from covid and multiorgan failure, she was on out service. He denies CP, SOB, dysuria, or edema, no fever/chills.      Past Medical History:   Diagnosis Date    Anxiety     r/t difficulty swallowing    Arthritis     CAD (coronary artery disease)     stent placed 2003, Folowed by Dr. Nikkie Alford Chronic pain     Knees, hips, feet and neck     Diabetes ([facility] [address] 2003    type 2; oral meds, avg blood sugar-100-120, Last A1C 6.4 on 5/10/19, Hypoglycemic signs at 50    Dysphasia     Esophageal dilatation     x 2 times, last in 2-12     GERD (gastroesophageal reflux disease)     controlled with as needed medication    Gout     Hypercholesteremia     Daily meds     Hypertension Managed with medication    Kidney stones     hx    Sleep apnea     Pt states history of and no longer uses c-pap machine     Stroke ([facility] [address]     2003-TIA- no deficits     Unspecified adverse effect of anesthesia       Past Surgical History:   Procedure Laterality Date    COLONOSCOPY N/A 9/8/2017    COLONOSCOPY/ 34 performed by Daniel Alvarenga MD at [facility] HX COLONOSCOPY  2016    repeat in 2019    [address] [facility] [address]  2003    One stent     HX OTHER SURGICAL      rectal polyps removed    HX OTHER SURGICAL  2010    esophageal dilation with biopsy    HX THYROIDECTOMY  07/30/2019    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    HX WISDOM TEETH EXTRACTION      age 50      Current Facility-Administered Medications   Medication Dose Route Frequency    sodium bicarbonate tablet 650 mg  650 mg Oral BID    sodium chloride (NS) flush 5-10 mL  5-10 mL IntraVENous Q8H    sodium chloride (NS) flush 5-10 mL  5-10 mL IntraVENous PRN    aspirin delayed-release tablet 81 mg  81 mg Oral DAILY    levothyroxine (SYNTHROID) tablet 112 mcg  112 mcg Oral ACB    rosuvastatin (CRESTOR) tablet 20 mg  20 mg Oral QHS    sodium chloride (NS) flush 5-40 mL  5-40 mL IntraVENous Q8H    sodium chloride (NS) flush 5-40 mL  5-40 mL IntraVENous PRN    acetaminophen (TYLENOL) tablet 650 mg  650 mg Oral Q6H PRN    Or    acetaminophen (TYLENOL) suppository 650 mg  650 mg Rectal Q6H PRN    ondansetron (ZOFRAN ODT) tablet 4 mg  4 mg Oral Q8H PRN    Or    ondansetron (ZOFRAN) injection 4 mg  4 mg IntraVENous Q6H PRN    heparin (porcine) injection 5,000 Units  5,000 Units SubCUTAneous Q8H    0.9% sodium chloride infusion  125 mL/hr IntraVENous CONTINUOUS    tuberculin injection 5 Units  5 Units IntraDERMal ONCE    insulin lispro (HUMALOG) injection   SubCUTAneous AC&HS    pantoprazole (PROTONIX) 40 mg in 0.9% sodium chloride 10 mL injection  40 mg IntraVENous DAILY     Allergies   Allergen Reactions    Pcn [Penicillins] Rash      Social History     Tobacco Use    Smoking status: Former Smoker     Packs/day: 2.00     Years: 5.00     Pack years: 10.00     Types: Cigarettes     Quit date: 1980     Years since quittin.3    Smokeless tobacco: Never Used   Substance Use Topics    Alcohol use: No     Alcohol/week: 0.0 standard drinks      Family History   Problem Relation Age of Onset    Heart Disease Mother     Heart Disease Father     Stroke Father     Cancer Father         prostate cancer    No Known Problems Maternal Grandmother     No Known Problems Maternal Grandfather     No Known Problems Paternal Grandmother     No Known Problems Paternal Grandfather     Colon Cancer Brother     Prostate Cancer Brother     Heart Attack Paternal Uncle     Colon Cancer Brother     Thyroid Disease Daughter         Review of Systems  Gen - no fever, no chills, appetite poor  HEENT - no sore throat  CV - no chest pain, no palpitation, no orthopnea  Lung - no shortness of breath, no cough, no hemoptysis  Abd - + tenderness, + nausea/vomiting/diarrhea- better  Ext - no edema, no clubbing, no cyanosis  Musculoskeletal - no joint pain, no back pain  Neurologic - no headaches, no dizziness, no seizures  Psychiatric - no anxiety, no depression  Skin - no rashes, no pupura  Genitourinary - + decreased urine output, no hematuria, no dysuria    Objective:     Vitals:    22 0000 22 0400 22 0408 22 0752   BP:   (!) 98/56 (!) 89/55   Pulse: 90 87 87 92   Resp:   20 18   Temp:   97.5 °F (36.4 °C) 97.3 °F (36.3 °C)   SpO2:   98% 100%   Weight:       Height:           Intake/Output Summary (Last 24 hours) at 2022 0941  Last data filed at 2022 0930  Gross per 24 hour   Intake 2239 ml   Output --   Net 2239 ml       Physical Exam  GEN :in no distress, alert and oriented  HEENT: anicteric sclerae, Mucous membranes are moist.  Neck - supple without JVD  CV - regular rate, S1, S2, no Rub   Lung - clear bilaterally, lungs expand symmetrically  Abd - soft, nontender, bowel sounds present, no hepatosplenomegaly  Ext - no clubbing, no cyanosis, no edema  Neurologic - nonfocal, no asterixis   Genitourinary - bladder nonpalpable  Skin - no rashes, no purpura  Psychiatric: Normal mood and affect. Data Review:   Recent Labs     04/19/22  0325 04/18/22  1323   WBC 8.2 10.8   HGB 11.6* 13.4*   HCT 34.9* 40.1*    168     Recent Labs     04/19/22  0325 04/18/22  2356 04/18/22  1323    142 137   K 5.1 5.0 5.2*   * 117* 108*   CO2 12* 14* 14*   * 101* 114*   CREA 4.00* 4.70* 7.70*   GLU 75 85 126*   CA 8.2* 8.3 9.3   MG  --   --  2.2     No results for input(s): PH, PCO2, PO2, PCO2 in the last 72 hours. Problem List:     Patient Active Problem List    Diagnosis Date Noted    [facility] 04/18/2022    Gastroenteritis 04/18/2022    Hyperkalemia 04/18/2022    Hematuria, unspecified 04/18/2022    Anemia 50/61/1615    Metabolic acidosis 04/95/6883    Diastolic dysfunction 58/58/7822    Papillary thyroid carcinoma ([facility] [address] 10/23/2019    Postsurgical hypothyroidism 10/23/2019    Esophageal dysphagia 06/05/2019    Controlled type 2 diabetes mellitus with diabetic neuropathy, without long-term current use of insulin ([facility] [address] 05/15/2019    Mixed hyperlipidemia 05/15/2019    Hyperuricemia 05/15/2019    Coronary artery disease involving native coronary artery of native heart without angina pectoris 05/15/2019    Hyperlipidemia LDL goal <70 05/15/2019    Uncontrolled type 2 diabetes mellitus with complication, without long-term current use of insulin 01/03/2018    Primary insomnia 01/03/2018    Essential hypertension 01/03/2018    Reflux esophagitis 01/03/2018    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 01/03/2018       Impression:    Plan:   1.  RITA likely pre renal azotemia in the setting of N/V/D, hypotension.  -renal US, abd CT revealed non obstructing right upper pole renal stone, normal echogenicity no hydronephrosis. Baseline Cr 0.8-1.0  Admitting Cr 7.70-. 4.70->4.00  Obtain UA, urine studies, P/C ratio, uric acid, CK, lactic acid and phosphorus. Med hx significant for mobic   -no indication for acute RRT at this time, trend renal indices with strict I&O  Hold lasix    2. Metabolic acidosis- add NaHCO3 gtts    3. DM type II- SSI per hosp  hold metformin     4. Abd tenderness, N/V/D- tolerating liquids this AM  -work up per hosp    5. Hypertension- hold antihypertensives with hypotension   Check AM cortisol     6.  Hx hypothyroidism, dysphagia s/p esophageal dilatation, KARI

## 2022-04-19 NOTE — PROGRESS NOTES
Iv site leaking, iv discontinued, site w/o redness, swelling, ,  New iv site to left wrist per student, tolerated well. ivf resumed.

## 2022-04-19 NOTE — PROGRESS NOTES
Ordered labs drawn, seen per nephrology, aware urine sample needed for lab, collection cup at bedside.

## 2022-04-19 NOTE — PROGRESS NOTES
Care Management Interventions  PCP Verified by CM: Yes  Physical Therapy Consult: Yes  Occupational Therapy Consult: Yes  Speech Therapy Consult: No  Support Systems: Other Family Member(s)  Confirm Follow Up Transport: Self  Freedom of Choice List was Provided with Basic Dialogue that Supports the Patient's Individualized Plan of Care/Goals, Treatment Preferences and Shares the Quality Data Associated with the Providers?: Yes  [place] Resource Information Provided?: No  Discharge Location  Patient Expects to be Discharged to[de-identified] Home  Patient is alert and oriented in all spheres. Lives alone. Uses a walker to ambulate. Independent with ADLs. Drives. Confirmed patient has a pcp. Patient has a nephew that lives near by but he has his own family so doesn't come around often. Patient also has a son that moved out about 7 months ago that doesn't come by very much. Patient doesn't think he has every had involvement with  or rehab but isn't certain at this time. PT/OT consulted. CM following.

## 2022-04-19 NOTE — PROGRESS NOTES
Resting in bed, alert, oriented, talkative. Hob elevated remains on room air. Lung sounds clear. Abdomen soft, bs present, denies any nausea this am. States voiding w/o any difficulty. Trace edema noted. Iv right arm intact with NS infusing at 125cc/hr via pump. Lung sounds clear. Remains on remote tele with nsr reported. Denies any pain aware to call for asst, call button w/i reach. . Labs resulted and reviewed.

## 2022-04-19 NOTE — PROGRESS NOTES
ACUTE OT GOALS:  (Developed with and agreed upon by patient and/or caregiver.)  1. Pt will toilet with SBA   2. Pt will complete functional mobility for ADLs with SBA using AD as needed  3. Pt will complete lower body dressing with SBA using AE as needed  4. Pt will complete grooming and hygiene at sink with SBA  5. Pt will demonstrate independence with HEP to promote increased BUE strength and functional use for ADLs  6. Pt will independently verbalize/ demonstrate 2+ fall prevention techniques to promote > safety during ADLs    Timeframe: 7 days      OCCUPATIONAL THERAPY ASSESSMENT: Initial Assessment and Daily Note OT Treatment Day # 1    Valentina Cheadle is a 78 y.o. male   PRIMARY DIAGNOSIS: RITA (acute kidney injury) ([facility] [address]  RITA (acute kidney injury) ([facility] [address] [N17.9]       Reason for Referral:  RITA, frequent falls  ICD-10: Treatment Diagnosis: Generalized Muscle Weakness (M62.81)  Repeated Falls (R29.6)  History of falling (Z91.81)  INPATIENT: Payor: [employer] [place] / [place] / Product Type: Managed Care Medicare /   ASSESSMENT:     REHAB RECOMMENDATIONS:   Recommendation to date pending progress:  [address] [place] [address]  Equipment:    3 in 1 Bedside Commode     PRIOR LEVEL OF FUNCTION:  (Prior to Hospitalization)  INITIAL/CURRENT LEVEL OF FUNCTION:  (Based on today's evaluation)   Bathing:   Independent  Dressing:   Independent  Feeding/Grooming:   Independent  Toileting:   Independent  Functional Mobility:   Modified Independent Bathing:   Contact Guard Assistance  Dressing:   Contact Guard Assistance  Feeding/Grooming:   Contact Guard Assistance  Toileting:   Contact Guard Assistance  Functional Mobility:   Contact Guard Assistance     ASSESSMENT:  Mr. Elvia Elias presented generally weak with deficits in mobility, balance, and endurance impacting ADLs.  Pt demonstrated ADLs and mobility for ADLs with CGA, initially used RW however unsafe w/ use and much safer and more functional w/o an AD. Pt denied having any dizziness today. Pt is below his functional baseline and would benefit from skilled OT services to address deficits. SUBJECTIVE:   Mr. Lorna Rodriguez states, \"I\"m not dizzy today. \" Pt verbalized grief over his daughter's recent passing d/t COVID. SOCIAL HISTORY/LIVING ENVIRONMENT: Lives alone, friend (homeless) lives in his basement. Independent, normally does not use an AD but recently started using a RW d/t falls. Frequent falls w/ onset of illness and dizziness. WIS.    Home Environment: Apartment  One/Two Story Residence: Two story, live on 1st floor  Living Alone: No  Support Systems: Other Family Member(s)    OBJECTIVE:     PAIN: VITAL SIGNS: LINES/DRAINS:   Pre Treatment: Pain Screen  Pain Scale 1: Numeric (0 - 10)  Pain Intensity 1: 0  Post Treatment: 0   IV  O2 Device: None (Room air)     GROSS EVALUATION:  BUE Within Functional Limits Abnormal/ Functional Abnormal/ Non-Functional (see comments) Not Tested Comments:   AROM [x] [] [] []    PROM [] [] [] []    Strength [] [x] [] []    Balance [] [x] [] []    Posture [] [] [] []    Sensation [] [] [] []    Coordination [x] [] [] []    Tone [] [] [] []    Edema [] [] [] []    Activity Tolerance [] [x] [] []     [] [] [] []      COGNITION/  PERCEPTION: Intact Impaired   (see comments) Comments:   Orientation [x] []    Vision [x] []    Hearing [] [x]    Judgment/ Insight [] [x]    Attention [x] []    Memory [x] []    Command Following [x] []    Emotional Regulation [x] []     [] []      ACTIVITIES OF DAILY LIVING: I Mod I S SBA CGA Min Mod Max Total NT Comments   BASIC ADLs:              Bathing/ Showering [] [] [] [] [] [] [] [] [] []    Toileting [] [] [] [] [x] [] [] [] [] []    Dressing [] [] [] [] [x] [] [] [] [] []    Feeding [] [] [] [] [] [] [] [] [] []    Grooming [] [] [] [] [x] [] [] [] [] []    Personal Device Care [] [] [] [] [] [] [] [] [] []    Functional Mobility [] [] [] [] [x] [] [] [] [] []    I=Independent, Mod I=Modified Independent, S=Supervision, SBA=Standby Assistance, CGA=Contact Guard Assistance,   Min=Minimal Assistance, Mod=Moderate Assistance, Max=Maximal Assistance, Total=Total Assistance, NT=Not Tested    MOBILITY: I Mod I S SBA CGA Min Mod Max Total  NT x2 Comments:   Supine to sit [] [] [] [x] [] [] [] [] [] [] []    Sit to supine [] [] [] [] [] [] [] [] [] [] []    Sit to stand [] [] [] [] [x] [] [] [] [] [] []    Bed to chair [] [] [] [] [x] [] [] [] [] [] []    I=Independent, Mod I=Modified Independent, S=Supervision, SBA=Standby Assistance, CGA=Contact Guard Assistance,   Min=Minimal Assistance, Mod=Moderate Assistance, Max=Maximal Assistance, Total=Total Assistance, NT=Not [facility] [address] [facility]\"" 116   Daily Activity Inpatient Short Form        How much help from another person does the patient currently need. .. Total A Lot A Little None   1. Putting on and taking off regular lower body clothing? [] 1   [] 2   [x] 3   [] 4   2. Bathing (including washing, rinsing, drying)? [] 1   [] 2   [x] 3   [] 4   3. Toileting, which includes using toilet, bedpan or urinal?   [] 1   [] 2   [x] 3   [] 4   4. Putting on and taking off regular upper body clothing? [] 1   [] 2   [] 3   [x] 4   5. Taking care of personal grooming such as brushing teeth? [] 1   [] 2   [] 3   [x] 4   6. Eating meals? [] 1   [] 2   [] 3   [x] 4   © 2007, Trustees of [facility] [employer], under license to [employer]. All rights reserved     Score:  Initial: 21 Most Recent: X (Date: -- )   Interpretation of Tool:  Represents activities that are increasingly more difficult (i.e. Bed mobility, Transfers, Gait). PLAN:   FREQUENCY/DURATION: OT Plan of Care: 3 times/week for duration of hospital stay or until stated goals are met, whichever comes first.    PROBLEM LIST:   (Skilled intervention is medically necessary to address:)  1. Decreased ADL/Functional Activities  2.  Decreased Activity Tolerance  3. Decreased Balance  4. Decreased Strength  5. Decreased Transfer Abilities   INTERVENTIONS PLANNED:   (Benefits and precautions of occupational therapy have been discussed with the patient.)  1. Self Care Training  2. Therapeutic Activity  3. Therapeutic Exercise/HEP  4. Neuromuscular Re-education  5. Education     TREATMENT:     EVALUATION: Low Complexity : (Untimed Charge)    TREATMENT:   ($$ Self Care/Home Management: 8-22 mins    )  Co-Treatment PT/OT necessary due to patient's decreased overall endurance/tolerance levels, as well as need for high level skilled assistance to complete functional transfers/mobility and functional tasks  Self Care (10 Minutes): Self care including Toileting, Grooming and Energy Conservation Training to increase independence and decrease level of assistance required.     TREATMENT GRID:  N/A    AFTER TREATMENT POSITION/PRECAUTIONS:  Alarm Activated, Chair, Needs within reach and RN notified    INTERDISCIPLINARY COLLABORATION:  RN/PCT and PT/PTA    TOTAL TREATMENT DURATION:  OT Patient Time In/Time Out  Time In: 0612  Time Out: Mark Anthony  Eduardo Sweet

## 2022-04-19 NOTE — PROGRESS NOTES
Pt alert and oriented times 3. Pt has no complaints of pain at this time. Pt is on room air with even and unlabored respirations. Pt ambulates well, but states has had multiple falls In the past due to dizziness. Dual skin assessment was completed with [place], RN. No skin breakdown or pressure injuries noted. Pt has 20 in right ac that flushes well with no issues. Pt made aware to notify staff when wanting to get up. Call light is in place, will continue to monitor.

## 2022-04-19 NOTE — PROGRESS NOTES
Hospitalist Progress Note   Admit Date:  2022  1:09 PM   Name:  Ulises Carpenter   Age:  78 y.o. Sex:  male  :  1945   MRN:  [id number]   Room:  3/    Presenting Complaint: Vomiting    Reason(s) for Admission: RITA (acute kidney injury) [facility]) [N17.9]     Hospital Course & Interval History:   Please refer to the admission H&P for details of presentation. In summary, Ulises Carpenter is a 78 y.o. male with medical history significant for  BPH followed by urology (states needs biopsy of prostate), DM2, HTN, CAD, LVDD who is evaluated with ongoing weakness and found to have RITA. Patient also had multiple episodes of dark emesis and melanotic stools. Subjective/24 hr Events (22) : Patient is seen and examined at bedside. No acute events reported overnight by nursing staff. Reports he is able to take some PO intake this AM without n/v. No longer having any emesis and melanotic stools. Patient denies fever, chills, chest pains, shortness of breath, n/v, abdominal pain. Review of Systems: 10 point review of systems is otherwise negative with the exception of the elements mentioned above. Assessment & Plan: RITA with metabolic acidosis  Hyperkalemia has resolved  Creatinine of 7.0 on admission baseline less than 1.  22: Cr of 4.0 today  - nephrology consulted.  Appreciate recs  - IVF  - hold benazepril, mobic, metformin, inspra   - continue on bicarb drip    Essential hypertension // HLD // CAD  BP still borderline this morning.  - Holding benazepril, inspra, lasix, metoprolol with RITA / borderline BPs  - crestor, Asa    T2DM:   SSI      Postsurgical hypothyroidism : Continued synthroid         Gastroenteritis - Resolved   - Clear liquids to advance as tolerant         Anemia with concern for GI Bleed  Dark emesis + melena and +ve stool occult test  - Consulted GI: plan for EGD and colonoscopy       Hematuria // BPH - followup urology out pt      Diet:  ADULT DIET Clear Liquid  DIET NPO  DVT PPx: SCDs  Code status: Full Code      I have reviewed and ordered clinical lab tests and independently visualized images, tracing. I spent 36 minutes of time caring for this patient at bedside or nearby, more than 50 percent of which was spent on coordination of care and/or counseling regarding the disease process, treatment options, and treatment plan.         Hospital Problems as of 4/19/2022 Date Reviewed: 4/7/2022          Codes Class Noted - Resolved POA    * (Principal) RITA (acute kidney injury) ([facility] [address] ICD-10-CM: N17.9  ICD-9-CM: 584.9  4/18/2022 - Present Yes        Gastroenteritis ICD-10-CM: K52.9  ICD-9-CM: 558.9  4/18/2022 - Present Yes        Hyperkalemia ICD-10-CM: E87.5  ICD-9-CM: 276.7  4/18/2022 - Present Yes        Hematuria, unspecified ICD-10-CM: R31.9  ICD-9-CM: 599.70  4/18/2022 - Present Yes        Anemia ICD-10-CM: D64.9  ICD-9-CM: 285.9  4/18/2022 - Present Yes        Metabolic acidosis [id number]: E87.2  ICD-9-CM: 276.2  4/18/2022 - Present Yes        Diastolic dysfunction (Chronic) ICD-10-CM: I51.89  ICD-9-CM: 429.9  4/18/2022 - Present Yes        Postsurgical hypothyroidism ICD-10-CM: E89.0  ICD-9-CM: 244.0  10/23/2019 - Present Yes        Controlled type 2 diabetes mellitus with diabetic neuropathy, without long-term current use of insulin (HCC) ICD-10-CM: E11.40  ICD-9-CM: 250.60, 357.2  5/15/2019 - Present Yes        Mixed hyperlipidemia ICD-10-CM: E78.2  ICD-9-CM: 272.2  5/15/2019 - Present Yes        Coronary artery disease involving native coronary artery of native heart without angina pectoris ICD-10-CM: I25.10  ICD-9-CM: 414.01  5/15/2019 - Present Yes        Essential hypertension (Chronic) ICD-10-CM: I10  ICD-9-CM: 401.9  1/3/2018 - Present Yes              Objective:     Patient Vitals for the past 24 hrs:   Temp Pulse Resp BP SpO2   04/19/22 1509 97.3 °F (36.3 °C) 85 18 126/65 100 %   04/19/22 1051 98.2 °F (36.8 °C) 92 18 (!) 112/55 99 % 04/19/22 0752 97.3 °F (36.3 °C) 92 18 (!) 89/55 100 %   04/19/22 0408 97.5 °F (36.4 °C) 87 20 (!) 98/56 98 %   04/19/22 0400 -- 87 -- -- --   04/19/22 0000 -- 90 -- -- --   04/18/22 2321 97.2 °F (36.2 °C) 89 16 (!) 112/56 100 %   04/18/22 2058 97.6 °F (36.4 °C) 89 20 116/72 100 %   04/18/22 2000 -- 95 -- -- --   04/18/22 1940 -- 92 19 109/62 100 %   04/18/22 1907 -- 94 20 (!) 124/57 100 %   04/18/22 1740 -- 90 15 115/61 100 %   04/18/22 1710 -- 90 19 110/64 100 %   04/18/22 1700 -- 91 16 105/61 100 %   04/18/22 1630 -- 87 15 (!) 113/59 100 %   04/18/22 1619 -- 98 -- 104/67 --     Oxygen Therapy  O2 Sat (%): 100 % (04/19/22 1509)  Pulse via Oximetry: 92 beats per minute (04/18/22 1940)  O2 Device: None (Room air) (04/19/22 1444)    Estimated body mass index is 28.89 kg/m² as calculated from the following:    Height as of this encounter: 5' 8\" (1.727 m). Weight as of this encounter: 86.2 kg (190 lb). Intake/Output Summary (Last 24 hours) at 4/19/2022 1559  Last data filed at 4/19/2022 1357  Gross per 24 hour   Intake 480 ml   Output --   Net 480 ml         Physical Exam:     Blood pressure 126/65, pulse 85, temperature 97.3 °F (36.3 °C), resp. rate 18, height 5' 8\" (1.727 m), weight 86.2 kg (190 lb), SpO2 100 %. General:    Well nourished. No overt distress  Head:  Normocephalic, atraumatic  Eyes:  Sclerae appear normal.  Pupils equally round. ENT:  Nares appear normal, no drainage. Moist oral mucosa  Neck:  No restricted ROM. Trachea midline   CV:   RRR. No m/r/g. No jugular venous distension. Lungs:   CTAB. No wheezing, Respirations even, unlabored  Abdomen: Bowel sounds present. Soft, nontender, nondistended. Extremities: No cyanosis or clubbing. No edema  Skin:     No rashes and normal coloration. Warm and dry. Neuro:  CN II-XII grossly intact. .  A&Ox3  Psych:  Normal mood and affect.       I have reviewed ordered lab tests and independently visualized imaging below:    Recent Labs:  Recent Results (from the past 48 hour(s))   TYPE & SCREEN    Collection Time: 04/18/22  1:22 PM   Result Value Ref Range    Crossmatch Expiration 04/21/2022,2359     ABO/Rh(D) A POSITIVE     Antibody screen NEG    CBC WITH AUTOMATED DIFF    Collection Time: 04/18/22  1:23 PM   Result Value Ref Range    WBC 10.8 4.3 - 11.1 K/uL    RBC 4.27 4.23 - 5.6 M/uL    HGB 13.4 (L) 13.6 - 17.2 g/dL    HCT 40.1 (L) 41.1 - 50.3 %    MCV 93.9 79.6 - 97.8 FL    MCH 31.4 26.1 - 32.9 PG    MCHC 33.4 31.4 - 35.0 g/dL    RDW 14.8 (H) 11.9 - 14.6 %    PLATELET 580 473 - 713 K/uL    MPV 11.8 9.4 - 12.3 FL    ABSOLUTE NRBC 0.00 0.0 - 0.2 K/uL    DF AUTOMATED      NEUTROPHILS 64 43 - 78 %    LYMPHOCYTES 26 13 - 44 %    MONOCYTES 9 4.0 - 12.0 %    EOSINOPHILS 0 (L) 0.5 - 7.8 %    BASOPHILS 1 0.0 - 2.0 %    IMMATURE GRANULOCYTES 0 0.0 - 5.0 %    ABS. NEUTROPHILS 6.9 1.7 - 8.2 K/UL    ABS. LYMPHOCYTES 2.8 0.5 - 4.6 K/UL    ABS. MONOCYTES 1.0 0.1 - 1.3 K/UL    ABS. EOSINOPHILS 0.0 0.0 - 0.8 K/UL    ABS. BASOPHILS 0.1 0.0 - 0.2 K/UL    ABS. IMM. GRANS. 0.0 0.0 - 0.5 K/UL   METABOLIC PANEL, COMPREHENSIVE    Collection Time: 04/18/22  1:23 PM   Result Value Ref Range    Sodium 137 136 - 145 mmol/L    Potassium 5.2 (H) 3.5 - 5.1 mmol/L    Chloride 108 (H) 98 - 107 mmol/L    CO2 14 (L) 21 - 32 mmol/L    Anion gap 15 7 - 16 mmol/L    Glucose 126 (H) 65 - 100 mg/dL     (H) 8 - 23 MG/DL    Creatinine 7.70 (H) 0.8 - 1.5 MG/DL    GFR est AA 9 (L) >60 ml/min/1.73m2    GFR est non-AA 7 (L) >60 ml/min/1.73m2    Calcium 9.3 8.3 - 10.4 MG/DL    Bilirubin, total 0.4 0.2 - 1.1 MG/DL    ALT (SGPT) 27 12 - 65 U/L    AST (SGOT) 13 (L) 15 - 37 U/L    Alk.  phosphatase 73 50 - 136 U/L    Protein, total 7.4 6.3 - 8.2 g/dL    Albumin 4.0 3.2 - 4.6 g/dL    Globulin 3.4 2.3 - 3.5 g/dL    A-G Ratio 1.2 1.2 - 3.5     MAGNESIUM    Collection Time: 04/18/22  1:23 PM   Result Value Ref Range    Magnesium 2.2 1.8 - 2.4 mg/dL   LIPASE    Collection Time: 04/18/22 1:23 PM   Result Value Ref Range    Lipase 565 (H) 73 - 393 U/L   POC URINE MACROSCOPIC    Collection Time: 04/18/22  3:11 PM   Result Value Ref Range    Spec. gravity (POC) >1.030 (H) 1.001 - 1.023    pH, urine  (POC) 5.0 5.0 - 9.0      Protein (POC) 30 (A) NEG mg/dL    Glucose, urine (POC) Negative NEG mg/dL    Ketones (POC) TRACE (A) NEG mg/dL    Bilirubin (POC) MODERATE (A) NEG      Blood (POC) LARGE (A) NEG      Urobilinogen (POC) 0.2 0.2 - 1.0 EU/dL    Nitrite (POC) Negative NEG      Leukocyte esterase (POC) TRACE (A) NEG      Performed by Timothy Wong    URINE MICROSCOPIC    Collection Time: 04/18/22  3:17 PM   Result Value Ref Range    WBC 10-20 0 /hpf    RBC 5-10 0 /hpf    Epithelial cells 0-3 0 /hpf    Bacteria 0 0 /hpf    Casts 10-20 0 /lpf   CULTURE, URINE    Collection Time: 04/18/22  3:17 PM    Specimen: Clean catch; Urine   Result Value Ref Range    Special Requests: NO SPECIAL REQUESTS      Culture result:        NO GROWTH AFTER SHORT PERIOD OF INCUBATION. FURTHER RESULTS TO FOLLOW AFTER OVERNIGHT INCUBATION. COVID-19 RAPID TEST    Collection Time: 04/18/22  7:05 PM   Result Value Ref Range    Specimen source Nasopharyngeal      COVID-19 rapid test Not detected NOTD     OCCULT BLOOD, STOOL    Collection Time: 04/18/22  8:34 PM   Result Value Ref Range    Occult blood, stool Positive (A) NEG     GLUCOSE, POC    Collection Time: 04/18/22  8:55 PM   Result Value Ref Range    Glucose (POC) 84 65 - 100 mg/dL    Performed by [facility]    EKG, 12 LEAD, INITIAL    Collection Time: 04/18/22  9:04 PM   Result Value Ref Range    Ventricular Rate 89 BPM    Atrial Rate 89 BPM    P-R Interval 172 ms    QRS Duration 100 ms    Q-T Interval 370 ms    QTC Calculation (Bezet) 450 ms    Calculated P Axis 61 degrees    Calculated R Axis 62 degrees    Calculated T Axis 53 degrees    Diagnosis       Normal sinus rhythm  Normal ECG  When compared with ECG of 27-FEB-2012 10:07,  Vent.  rate has increased BY 37 BPM  QT has lengthened  Confirmed by ST UBALDO DILLARD MD (), DEON MCKAY ([id number]) on 4/19/2022 9:03:41 AM     SARS-COV-2    Collection Time: 04/18/22 10:40 PM   Result Value Ref Range    SARS-CoV-2 by PCR Please find results under separate order     COVID-19 RAPID TEST    Collection Time: 04/18/22 10:40 PM   Result Value Ref Range    Specimen source Nasopharyngeal      COVID-19 rapid test Not detected NOTD     METABOLIC PANEL, BASIC    Collection Time: 04/18/22 11:56 PM   Result Value Ref Range    Sodium 142 136 - 145 mmol/L    Potassium 5.0 3.5 - 5.1 mmol/L    Chloride 117 (H) 98 - 107 mmol/L    CO2 14 (L) 21 - 32 mmol/L    Anion gap 11 7 - 16 mmol/L    Glucose 85 65 - 100 mg/dL     (H) 8 - 23 MG/DL    Creatinine 4.70 (H) 0.8 - 1.5 MG/DL    GFR est AA 16 (L) >60 ml/min/1.73m2    GFR est non-AA 13 (L) >60 ml/min/1.73m2    Calcium 8.3 8.3 - 10.4 MG/DL   SARS-COV-2    Collection Time: 04/19/22 12:43 AM   Result Value Ref Range    SARS-CoV-2 by PCR Please find results under separate order     SARS-COV-2, PCR    Collection Time: 04/19/22 12:43 AM    Specimen: Nasopharyngeal   Result Value Ref Range    Specimen source Nasopharyngeal      SARS-CoV-2 by PCR Not detected NOTD     METABOLIC PANEL, BASIC    Collection Time: 04/19/22  3:25 AM   Result Value Ref Range    Sodium 144 136 - 145 mmol/L    Potassium 5.1 3.5 - 5.1 mmol/L    Chloride 119 (H) 98 - 107 mmol/L    CO2 12 (L) 21 - 32 mmol/L    Anion gap 13 7 - 16 mmol/L    Glucose 75 65 - 100 mg/dL     (H) 8 - 23 MG/DL    Creatinine 4.00 (H) 0.8 - 1.5 MG/DL    GFR est AA 19 (L) >60 ml/min/1.73m2    GFR est non-AA 16 (L) >60 ml/min/1.73m2    Calcium 8.2 (L) 8.3 - 10.4 MG/DL   CBC WITH AUTOMATED DIFF    Collection Time: 04/19/22  3:25 AM   Result Value Ref Range    WBC 8.2 4.3 - 11.1 K/uL    RBC 3.73 (L) 4.23 - 5.6 M/uL    HGB 11.6 (L) 13.6 - 17.2 g/dL    HCT 34.9 (L) 41.1 - 50.3 %    MCV 93.6 79.6 - 97.8 FL    MCH 31.1 26.1 - 32.9 PG    MCHC 33.2 31.4 - 35.0 g/dL RDW 14.6 11.9 - 14.6 %    PLATELET 487 593 - 698 K/uL    MPV 12.4 (H) 9.4 - 12.3 FL    ABSOLUTE NRBC 0.00 0.0 - 0.2 K/uL    DF AUTOMATED      NEUTROPHILS 65 43 - 78 %    LYMPHOCYTES 26 13 - 44 %    MONOCYTES 8 4.0 - 12.0 %    EOSINOPHILS 1 0.5 - 7.8 %    BASOPHILS 1 0.0 - 2.0 %    IMMATURE GRANULOCYTES 0 0.0 - 5.0 %    ABS. NEUTROPHILS 5.3 1.7 - 8.2 K/UL    ABS. LYMPHOCYTES 2.1 0.5 - 4.6 K/UL    ABS. MONOCYTES 0.7 0.1 - 1.3 K/UL    ABS. EOSINOPHILS 0.1 0.0 - 0.8 K/UL    ABS. BASOPHILS 0.1 0.0 - 0.2 K/UL    ABS. IMM. GRANS. 0.0 0.0 - 0.5 K/UL   GLUCOSE, POC    Collection Time: 04/19/22  7:47 AM   Result Value Ref Range    Glucose (POC) 79 65 - 100 mg/dL    Performed by [facility]    LACTIC ACID    Collection Time: 04/19/22 11:05 AM   Result Value Ref Range    Lactic acid 1.0 0.4 - 2.0 MMOL/L   CK    Collection Time: 04/19/22 11:05 AM   Result Value Ref Range    CK 62 21 - 215 U/L   PHOSPHORUS    Collection Time: 04/19/22 11:05 AM   Result Value Ref Range    Phosphorus 4.7 (H) 2.3 - 3.7 MG/DL   GLUCOSE, POC    Collection Time: 04/19/22 11:11 AM   Result Value Ref Range    Glucose (POC) 109 (H) 65 - 100 mg/dL    Performed by [employer]        All Micro Results     Procedure Component Value Units Date/Time    SARS-COV-2, PCR [[id number]] Collected: 04/19/22 0043    Order Status: Completed Specimen: Nasopharyngeal Updated: 04/19/22 0756     Specimen source Nasopharyngeal        SARS-CoV-2 by PCR Not detected        Comment:      The specimen is NEGATIVE for SARS-CoV-2, the novel coronavirus associated with COVID-19. This test has been authorized by the FDA under an Emergency Use Authorization (EUA) for use by authorized laboratories.         Fact sheet for Healthcare Providers: ConventionUpdate.co.nz       Fact sheet for Patients: ConventionUpdate.co.nz       Methodology: RT-PCR         CULTURE, URINE [[id number]] Collected: 04/18/22 9555 Order Status: Completed Specimen: Urine from Clean catch Updated: 04/19/22 0656     Special Requests: NO SPECIAL REQUESTS        Culture result:       NO GROWTH AFTER SHORT PERIOD OF INCUBATION. FURTHER RESULTS TO FOLLOW AFTER OVERNIGHT INCUBATION. COVID-19 RAPID TEST [[id number]] Collected: 04/18/22 2240    Order Status: Completed Specimen: Nasopharyngeal Updated: 04/18/22 2311     Specimen source Nasopharyngeal        COVID-19 rapid test Not detected        Comment:      The specimen is NEGATIVE for SARS-CoV-2, the novel coronavirus associated with COVID-19. A negative result does not rule out COVID-19. This test has been authorized by the FDA under an Emergency Use Authorization (EUA) for use by authorized laboratories. Fact sheet for Healthcare Providers: R + B Groupdate.co.nz  Fact sheet for Patients: R + B Groupdate.co.nz       Methodology: Isothermal Nucleic Acid Amplification         COVID-19 RAPID TEST [[id number]] Collected: 04/18/22 1905    Order Status: Completed Specimen: Nasopharyngeal Updated: 04/18/22 1948     Specimen source Nasopharyngeal        COVID-19 rapid test Not detected        Comment:      The specimen is NEGATIVE for SARS-CoV-2, the novel coronavirus associated with COVID-19. A negative result does not rule out COVID-19. This test has been authorized by the FDA under an Emergency Use Authorization (EUA) for use by authorized laboratories. Fact sheet for Healthcare Providers: ConventionLassodate.co.nz  Fact sheet for Patients: R + B Groupdate.co.nz       Methodology: Isothermal Nucleic Acid Amplification               Other Studies:  CT ABD PELV WO CONT    Result Date: 4/18/2022  CT abdomen and pelvis without contrast CLINICAL INDICATION: One week of worsening severe vomiting, multiple falls, pain involving both flanks and the low back, dizziness and shortness of breath.  Follow-up of left hydronephrosis and ureteral stone. History also includes hypertension, coronary artery disease, GERD, high cholesterol, diabetes type 2, gout, arthritis, rectal polyps. COMPARISON: August 22, 2012 TECHNIQUE: Dose reduction technique used: Automated exposure Control and/or adjustment of mA and kV according to patient size. Multiple contiguous axial CT images were obtained through the abdomen and pelvis without intravenous or oral contrast. Coronal reformatted images are obtained to further evaluate organs. FINDINGS: Partially included lung bases demonstrate no consolidation or effusion. There are tiny peripheral areas of pleural thickening and/or tiny nodules along the periphery of both bases, measuring 3 mm or less. There are partially visualized coronary artery and aortic calcifications, as well as bilateral gynecomastia. Abdomen: Previous left hydronephrosis and ureteral stones have resolved. There is a right posterior collecting system renal stone measuring about 1.4 cm, not significantly changed. Gallbladder has been removed in the interval, without evidence of surrounding inflammation or biliary dilatation. There is no hydronephrosis. There is no evidence of renal mass on limited non-contrast evaluation. No discrete lesions are seen in the noncontrasted visualized portions of the liver or spleen. No suspicious adrenal or pancreas lesions. No free air. No focal inflammatory changes or fluid collections. Aorta normal caliber. Diverticulosis is noted of the large bowel. There are questionable scattered areas of wall thickening in the large bowel versus underdistention artifact. No evidence of bowel obstruction, hernia, or appendicitis. GI evaluation is limited given no oral contrast. Pelvis CT: Urinary bladder is decompressed, with nonspecific wall thickening that could be underdistention artifact or cystitis. Prostate is borderline enlarged. There are no distal ureteral or bladder calculi.  No focal inflammatory changes or fluid collections in the pelvis. No evidence of lymphadenopathy. Bones: No acute osseous lesion. 1. No hydronephrosis or ureteral calculus. 2. Chronic nonobstructing right renal stone. 3. Cholecystectomy. US RETROPERITONEUM COMP    Result Date: 4/19/2022  EXAMINATION: US RETROPERITONEUM COMP HISTORY: RITA. TECHNIQUE: Grayscale and limited color Doppler ultrasound of the kidneys and bladder. COMPARISON: CT abdomen and pelvis dated 4/18/2022 FINDINGS: The right kidney measures 9.9 cm. There is normal cortical thickness and echogenicity. There is no evidence of hydronephrosis. No solid or cystic renal lesion is demonstrated. There is a 1.1 cm shadowing structure with twinkle artifact in the upper pole. This likely corresponds to the renal stone seen on prior CT. The left kidney measures 10.3 cm. There is normal cortical thickness and echogenicity. There is no evidence of hydronephrosis. No solid or cystic renal lesion is demonstrated. The inferior vena cava and aorta are visualized. Bladder is not visualized. The patient voided just prior to this examination. Nonobstructing right upper pole renal stone. Otherwise unremarkable examination of the kidneys.       Current Meds:  Current Facility-Administered Medications   Medication Dose Route Frequency    sodium bicarbonate (8.4%) 150 mEq in dextrose 5 % - 0.45% NaCl 1,000 mL infusion   IntraVENous CONTINUOUS    polyethylene glycol (MIRALAX) powder 238 g  238 g Oral ONCE    sodium chloride (NS) flush 5-10 mL  5-10 mL IntraVENous Q8H    sodium chloride (NS) flush 5-10 mL  5-10 mL IntraVENous PRN    aspirin delayed-release tablet 81 mg  81 mg Oral DAILY    levothyroxine (SYNTHROID) tablet 112 mcg  112 mcg Oral ACB    rosuvastatin (CRESTOR) tablet 20 mg  20 mg Oral QHS    sodium chloride (NS) flush 5-40 mL  5-40 mL IntraVENous Q8H    sodium chloride (NS) flush 5-40 mL  5-40 mL IntraVENous PRN    acetaminophen (TYLENOL) tablet 650 mg 650 mg Oral Q6H PRN    Or    acetaminophen (TYLENOL) suppository 650 mg  650 mg Rectal Q6H PRN    ondansetron (ZOFRAN ODT) tablet 4 mg  4 mg Oral Q8H PRN    Or    ondansetron (ZOFRAN) injection 4 mg  4 mg IntraVENous Q6H PRN    heparin (porcine) injection 5,000 Units  5,000 Units SubCUTAneous Q8H    tuberculin injection 5 Units  5 Units IntraDERMal ONCE    insulin lispro (HUMALOG) injection   SubCUTAneous AC&HS    pantoprazole (PROTONIX) 40 mg in 0.9% sodium chloride 10 mL injection  40 mg IntraVENous DAILY       Signed:  Jose Monaco MD    Part of this note may have been written by using a voice dictation software. The note has been proof read but may still contain some grammatical/other typographical errors.

## 2022-04-19 NOTE — PROGRESS NOTES
Pt alert and oriented times 3. Pt has no complaints of pain or discomfort. Pt has NS infusing at 125ml/h. Pt is on room air with even respirations. Pt still on droplet precautions pending PCR test. Call light is in place, will continue to monitor.

## 2022-04-19 NOTE — CONSULTS
[facility] Consult Note       Primary GI Physician: Dr. Chris Haile    Referring Provider:  Dr. Jin Crabtree Date:  4/19/2022    Admit Date:  4/18/2022    Chief Complaint:  Heme + stool, Dark Colored Emesis, melena, Anemia, Hx of colon polyps    Subjective:     History of Present Illness:  Patient is a 78 y.o. male with PMH including but not limited to BPH, DM II, HTN, CAD, LVDD, who is seen in consultation at the request of Dr. Irina Goodson for Heme + stool, dark colored stools and emesis, anemia, hx of colon polyps. Patient presented on 4/18/2022 for ongoing weakness with complaints of \"GI bug\" for 1 week prior to arrival. Pt is being treated for RITA. Labs on arrival: WBC 10.8, HGB 13.4, MCV 93, , Na 137, Creatinine 7.70, , TB 0.4, AST 13, ALT 27, AP 73, Lipase 565. Covid Negative. CT A/P without contrast 4/18/2022: 1. No hydronephrosis or ureteral calculus. 2. Chronic nonobstructing right renal stone. 3. Cholecystectomy. Colonoscopy 9/8/2017 with Dr. Diane Poster for surveillance purposes: left sided diverticulosis, IH, Hepatic flexure polyp. Path: TVA Polyp. Surveillance colon recommended for June [facility] at [facility].. We have not seen him since that time. Patient reports right flank pain that has improved. He has a hx of IH and colon polyps, overdue for surveillance colonoscopy. He reports a week of melena stools (6-7 stools a day) and coffee ground emesis. He reports the last time he vomited was on 4/17/22. He reports his stool this morning was tan/brown in color and liquid. He denies ETOH, smoking, or NSAIDs (reports he has not taken for 3-4 months now). Pts Hgb is 11.6 today from 13-15 range as baseline. Home medication list has ASA 81 mg daily and Meloxicam PRN listed. No blood thinners listed. On Heparin SC q 8 hrs.      PMH:  Past Medical History:   Diagnosis Date    Anxiety     r/t difficulty swallowing    Arthritis     CAD (coronary artery disease)     stent placed 2003, Folowed by Dr. Nikolai Forrester Chronic pain     Knees, hips, feet and neck     Diabetes ([facility] [address] 2003    type 2; oral meds, avg blood sugar-100-120, Last A1C 6.4 on 5/10/19, Hypoglycemic signs at 50    Dysphasia     Esophageal dilatation     x 2 times, last in 2-12     GERD (gastroesophageal reflux disease)     controlled with as needed medication    Gout     Hypercholesteremia     Daily meds     Hypertension     Managed with medication    Kidney stones     hx    Sleep apnea     Pt states history of and no longer uses c-pap machine     Stroke ([facility] [address]     2003-TIA- no deficits     Unspecified adverse effect of anesthesia        PSH:  Past Surgical History:   Procedure Laterality Date    COLONOSCOPY N/A 9/8/2017    COLONOSCOPY/ 34 performed by Eden Soto MD at [address] 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 Authorizing Provider   furosemide (LASIX) 20 mg tablet  3/1/22  Yes Provider, Historical   levothyroxine (SYNTHROID) 112 mcg tablet Take 1 Tablet by mouth Daily (before breakfast). 4/7/22  Yes Jovanni Higuera MD   methocarbamoL (ROBAXIN) 500 mg tablet Take 1 Tablet by mouth nightly as needed for Muscle Spasm(s). 1/26/22  Yes Robin Garcia MD   meloxicam (MOBIC) 15 mg tablet Take 1 Tablet by mouth daily as needed for Pain. 1/26/22  Yes Robin Garcia MD   metFORMIN (GLUCOPHAGE) 500 mg tablet Take 1 Tablet by mouth two (2) times daily (with meals).  1/26/22  Yes Robin Garcia MD   rosuvastatin (CRESTOR) 20 mg tablet Take 1 Tablet by mouth nightly. 1/26/22  Yes Beatrice Garcia MD   metoprolol succinate (TOPROL-XL) 100 mg tablet Take 1 Tablet by mouth daily. 1/26/22  Yes Beatrice Garcia MD   benazepriL (LOTENSIN) 40 mg tablet Take 1 Tablet by mouth daily. 1/26/22  Yes Beatrice Garcia MD   allopurinoL (ZYLOPRIM) 300 mg tablet Take 1 Tab by mouth daily. Indications: treatment to prevent acute gout attack 10/25/21  Yes Beatrice Garcia MD   glucose blood VI test strips (blood glucose test) strip Check glucose once daily for E11.9 7/21/21  Yes Joi Birmingham PA-C   lancets misc Use once daily for E11.9 7/21/21  Yes Joi Birmingham PA-C   Lancing Device (Lancing Device with Lancets) misc Use daily for E11.9 7/21/21  Yes Joi Birmingham PA-C   eszopiclone (LUNESTA) 3 mg tablet Take 1 Tab by mouth nightly. Max Daily Amount: 3 mg. 5/7/20  Yes Beatrice Garcia MD   eplerenone (INSPRA) 50 mg tab tablet Take 50 mg by mouth daily. Yes Provider, Historical   clotrimazole-betamethasone (LOTRISONE) topical cream Apply  to affected area two (2) times a day. 1/26/22   Beatrice Garcia MD   Blood-Glucose Meter monitoring kit Use kit for monitoring diabetes (e11.9) 7/21/21   Joi Birmingham PA-C   cetirizine (ZYRTEC) 10 mg tablet  4/19/21   Provider, Historical   aspirin delayed-release 81 mg tablet Take 1 Tab by mouth daily. 11/16/18   Joi Birmingham PA-C   nitroglycerin (NITROSTAT) 0.4 mg SL tablet 1 Tab by SubLINGual route every five (5) minutes as needed for Chest Pain.  6/10/16   Lexi Mcdonnell MD       [facility] Medications:  Current Facility-Administered Medications   Medication Dose Route Frequency    sodium bicarbonate (8.4%) 150 mEq in dextrose 5 % - 0.45% NaCl 1,000 mL infusion   IntraVENous CONTINUOUS    sodium chloride (NS) flush 5-10 mL  5-10 mL IntraVENous Q8H    sodium chloride (NS) flush 5-10 mL  5-10 mL IntraVENous PRN    aspirin delayed-release tablet 81 mg  81 mg Oral DAILY    levothyroxine (SYNTHROID) tablet 112 mcg  112 mcg Oral ACB    rosuvastatin (CRESTOR) tablet 20 mg  20 mg Oral QHS    sodium chloride (NS) flush 5-40 mL  5-40 mL IntraVENous Q8H    sodium chloride (NS) flush 5-40 mL  5-40 mL IntraVENous PRN    acetaminophen (TYLENOL) tablet 650 mg  650 mg Oral Q6H PRN    Or    acetaminophen (TYLENOL) suppository 650 mg  650 mg Rectal Q6H PRN    ondansetron (ZOFRAN ODT) tablet 4 mg  4 mg Oral Q8H PRN    Or    ondansetron (ZOFRAN) injection 4 mg  4 mg IntraVENous Q6H PRN    heparin (porcine) injection 5,000 Units  5,000 Units SubCUTAneous Q8H    tuberculin injection 5 Units  5 Units IntraDERMal ONCE    insulin lispro (HUMALOG) injection   SubCUTAneous AC&HS    pantoprazole (PROTONIX) 40 mg in 0.9% sodium chloride 10 mL injection  40 mg IntraVENous DAILY       Social History:  Social History     Tobacco Use    Smoking status: Former Smoker     Packs/day: 2.00     Years: 5.00     Pack years: 10.00     Types: Cigarettes     Quit date: 1980     Years since quittin.3    Smokeless tobacco: Never Used   Substance Use Topics    Alcohol use: No     Alcohol/week: 0.0 standard drinks       Pt denies any history of drug use, blood transfusions, or tattoos. Family History:  Family History   Problem Relation Age of Onset    Heart Disease Mother     Heart Disease Father     Stroke Father     Cancer Father         prostate cancer    No Known Problems Maternal Grandmother     No Known Problems Maternal Grandfather     No Known Problems Paternal Grandmother     No Known Problems Paternal Grandfather     Colon Cancer Brother     Prostate Cancer Brother     Heart Attack Paternal Uncle     Colon Cancer Brother     Thyroid Disease Daughter        Review of Systems:  A detailed 10 system ROS is obtained, with pertinent positives as listed above. All others are negative.     Diet:  Clear liquid     Objective:     Physical Exam:  Vitals:  Visit Vitals  BP (!) 112/55   Pulse 92   Temp 98.2 °F (36.8 °C)   Resp 18   Ht 5' 8\" (1.727 m)   Wt 86.2 kg (190 lb)   SpO2 99%   BMI 28.89 kg/m²     Gen:  Pt is alert, cooperative, no acute distress, sitting up in chair   Skin:  Extremities and face reveal no rashes. HEENT: Sclerae anicteric. Extra-occular muscles are intact. No abnormal pigmentation of the lips. The neck is supple. Cardiovascular: Regular rate and rhythm. No murmurs, gallops, or rubs. Respiratory:  Comfortable breathing with no accessory muscle use. Clear breath sounds anteriorly with no wheezes, rales, or rhonchi. GI:  Abdomen nondistended, soft, and mild tenderness to RUQ. Normal active bowel sounds. No enlargement of the liver or spleen. No masses palpable. Rectal:  Deferred. Occult positive stool. Hx of IH. Musculoskeletal:  No pitting edema of the lower legs. Neurological:  Patient is alert and oriented. Psychiatric:  Mood appears appropriate with judgement intact. Lymphatic:  No cervical or supraclavicular adenopathy. Laboratory:    Recent Labs     04/19/22  0325 04/18/22  2356 04/18/22  1323   WBC 8.2  --  10.8   HGB 11.6*  --  13.4*   HCT 34.9*  --  40.1*     --  168   MCV 93.6  --  93.9    142 137   K 5.1 5.0 5.2*   * 117* 108*   CO2 12* 14* 14*   * 101* 114*   CREA 4.00* 4.70* 7.70*   CA 8.2* 8.3 9.3   MG  --   --  2.2   GLU 75 85 126*   AP  --   --  73   AST  --   --  13*   ALT  --   --  27   TBILI  --   --  0.4   ALB  --   --  4.0   TP  --   --  7.4   LPSE  --   --  565*      CT abdomen and pelvis without contrast 4/18/22  CLINICAL INDICATION: One week of worsening severe vomiting, multiple falls, pain  involving both flanks and the low back, dizziness and shortness of breath. Follow-up of left hydronephrosis and ureteral stone. History also includes  hypertension, coronary artery disease, GERD, high cholesterol, diabetes type 2,  gout, arthritis, rectal polyps.   COMPARISON: August 22, 2012  TECHNIQUE: Dose reduction technique used:  Automated exposure Control and/or  adjustment of mA and kV according to patient size. Multiple contiguous axial CT  images were obtained through the abdomen and pelvis without intravenous or oral  contrast. Coronal reformatted images are obtained to further evaluate organs. FINDINGS: Partially included lung bases demonstrate no consolidation or  effusion. There are tiny peripheral areas of pleural thickening and/or tiny  nodules along the periphery of both bases, measuring 3 mm or less. There are  partially visualized coronary artery and aortic calcifications, as well as  bilateral gynecomastia. Abdomen: Previous left hydronephrosis and ureteral stones have resolved. There  is a right posterior collecting system renal stone measuring about 1.4 cm, not  significantly changed. Gallbladder has been removed in the interval, without  evidence of surrounding inflammation or biliary dilatation. There is no  hydronephrosis. There is no evidence of renal mass on limited non-contrast  evaluation.   No discrete lesions are seen in the noncontrasted visualized portions of the  liver or spleen. No suspicious adrenal or pancreas lesions. No free air. No  focal inflammatory changes or fluid collections. Aorta normal caliber. Diverticulosis is noted of the large bowel. There are questionable scattered  areas of wall thickening in the large bowel versus underdistention artifact. No  evidence of bowel obstruction, hernia, or appendicitis. GI evaluation is limited  given no oral contrast.   Pelvis CT: Urinary bladder is decompressed, with nonspecific wall thickening  that could be underdistention artifact or cystitis. Prostate is borderline  enlarged. There are no distal ureteral or bladder calculi. No focal inflammatory  changes or fluid collections in the pelvis. No evidence of lymphadenopathy. Bones: No acute osseous lesion. IMPRESSION  1. No hydronephrosis or ureteral calculus. 2. Chronic nonobstructing right renal stone.   3. Cholecystectomy.     Assessment:     Principal Problem:    RITA (acute kidney injury) ([facility] [address] (4/18/2022)    Active Problems:    Essential hypertension (1/3/2018)      Controlled type 2 diabetes mellitus with diabetic neuropathy, without long-term current use of insulin ([facility] [address] (5/15/2019)      Mixed hyperlipidemia (5/15/2019)      Coronary artery disease involving native coronary artery of native heart without angina pectoris (5/15/2019)      Postsurgical hypothyroidism (10/23/2019)      Gastroenteritis (4/18/2022)      Hyperkalemia (4/18/2022)      Hematuria, unspecified (4/18/2022)      Anemia (7/00/1176)      Metabolic acidosis (2/29/8008)      Diastolic dysfunction (8/52/4757)      78 y.o. male with PMH including but not limited to BPH, DM II, HTN, CAD, LVDD, who is seen in consultation at the request of Dr. Elvira Lowe for Heme + stool, dark colored stools and emesis, anemia, hx of colon polyps. Patient has a hx of IH and colon polyps, overdue for surveillance colonoscopy. He reports a week of melena stools (6-7 stools a day) and coffee ground emesis. He reports the last time he vomited was on 4/17/22. He reports his stool this morning was tan/brown in color and liquid. He denies ETOH, smoking, or NSAIDs (reports he has not taken for 3-4 months now). Pts Hgb is 11.6 today from 13-15 range as baseline. Home medication list has ASA 81 mg daily and Meloxicam PRN listed. No blood thinners listed. On Heparin SC q 8 hrs. Pt is being treated for RITA. CT reveals a chronic non obstructing right renal stone. Plan:     - Supportive care, maintain electrolytes   - Monitor H & H, transfuse PRN   - Monitor for overt bleeding   - Hold Heparin for procedure tomorrow. - Plan for EGD and Colonoscopy tomorrow 4/20/22 with Dr. Sheldon Verma to further evaluate for possible ulcerations, erosions, AVM's, polyps, and/or malignancy.         NAGI Samson  [facility]   Patient is seen and examined in collaboration with Dr. Luz Elena Calderon. Assessment and plan as per Dr. Luz Elena Calderon. ATTENDING NOTE:  I have seen the patient and agree with the above assessment and plan. Patient seen in consultation for coffee ground emesis, melena, heme-positive anemia, drop in hemoglobin from baseline and history of colon polyps. Patient will receive bowel prep today for EGD and colonoscopy tomorrow.      Luz Elena Calderon MD

## 2022-04-19 NOTE — PROGRESS NOTES
No n/v/d this shift. ivf D% 1/2NS infusing at 125cc/hr via pump. Prep with miralax started, instructions given, states he has taken before. bsc in room. Instructed to call for asst. Aware not to eat/drink past midnight. Remains on remote tele with nsr reported.

## 2022-04-20 ENCOUNTER — ANESTHESIA EVENT (OUTPATIENT)
Dept: ENDOSCOPY | Age: 77
DRG: 683 | End: 2022-04-20
Payer: MEDICARE

## 2022-04-20 ENCOUNTER — APPOINTMENT (OUTPATIENT)
Dept: ULTRASOUND IMAGING | Age: 77
DRG: 683 | End: 2022-04-20
Attending: INTERNAL MEDICINE
Payer: MEDICARE

## 2022-04-20 ENCOUNTER — ANESTHESIA (OUTPATIENT)
Dept: ENDOSCOPY | Age: 77
DRG: 683 | End: 2022-04-20
Payer: MEDICARE

## 2022-04-20 PROBLEM — K29.80 DUODENITIS: Status: ACTIVE | Noted: 2022-04-20

## 2022-04-20 PROBLEM — K64.9 RECTAL VARICES: Status: ACTIVE | Noted: 2022-04-20

## 2022-04-20 PROBLEM — K29.60 EROSIVE GASTRITIS: Status: ACTIVE | Noted: 2022-04-20

## 2022-04-20 LAB
ANION GAP SERPL CALC-SCNC: 6 MMOL/L (ref 7–16)
APPEARANCE UR: CLEAR
BACTERIA URNS QL MICRO: 0 /HPF
BILIRUB UR QL: ABNORMAL
BUN SERPL-MCNC: 67 MG/DL (ref 8–23)
CALCIUM SERPL-MCNC: 8.4 MG/DL (ref 8.3–10.4)
CASTS URNS QL MICRO: ABNORMAL /LPF
CHLORIDE SERPL-SCNC: 119 MMOL/L (ref 98–107)
CO2 SERPL-SCNC: 23 MMOL/L (ref 21–32)
COLOR UR: YELLOW
CORTIS AM PEAK SERPL-MCNC: 14.9 UG/DL (ref 7–25)
CREAT SERPL-MCNC: 1.5 MG/DL (ref 0.8–1.5)
EPI CELLS #/AREA URNS HPF: 0 /HPF
ERYTHROCYTE [DISTWIDTH] IN BLOOD BY AUTOMATED COUNT: 14.7 % (ref 11.9–14.6)
GLUCOSE BLD STRIP.AUTO-MCNC: 108 MG/DL (ref 65–100)
GLUCOSE BLD STRIP.AUTO-MCNC: 119 MG/DL (ref 65–100)
GLUCOSE BLD STRIP.AUTO-MCNC: 141 MG/DL (ref 65–100)
GLUCOSE SERPL-MCNC: 150 MG/DL (ref 65–100)
GLUCOSE UR STRIP.AUTO-MCNC: 100 MG/DL
HCT VFR BLD AUTO: 33.8 % (ref 41.1–50.3)
HGB BLD-MCNC: 11.6 G/DL (ref 13.6–17.2)
HGB UR QL STRIP: ABNORMAL
KAPPA LC FREE SER-MCNC: 37 MG/L (ref 3.3–19.4)
KAPPA LC FREE/LAMBDA FREE SER: 1.46 {RATIO} (ref 0.26–1.65)
KETONES UR QL STRIP.AUTO: NEGATIVE MG/DL
LAMBDA LC FREE SERPL-MCNC: 25.4 MG/L (ref 5.7–26.3)
LEUKOCYTE ESTERASE UR QL STRIP.AUTO: NEGATIVE
MCH RBC QN AUTO: 31.4 PG (ref 26.1–32.9)
MCHC RBC AUTO-ENTMCNC: 34.3 G/DL (ref 31.4–35)
MCV RBC AUTO: 91.6 FL (ref 79.6–97.8)
MM INDURATION POC: 0 MM (ref 0–5)
MUCOUS THREADS URNS QL MICRO: 0 /LPF
NITRITE UR QL STRIP.AUTO: NEGATIVE
NRBC # BLD: 0 K/UL (ref 0–0.2)
PH UR STRIP: 6 [PH] (ref 5–9)
PLATELET # BLD AUTO: 157 K/UL (ref 150–450)
PMV BLD AUTO: 12.2 FL (ref 9.4–12.3)
POTASSIUM SERPL-SCNC: 3.9 MMOL/L (ref 3.5–5.1)
PPD POC: NORMAL
PROT UR STRIP-MCNC: NEGATIVE MG/DL
RBC # BLD AUTO: 3.69 M/UL (ref 4.23–5.6)
RBC #/AREA URNS HPF: ABNORMAL /HPF
SERVICE CMNT-IMP: ABNORMAL
SODIUM SERPL-SCNC: 148 MMOL/L (ref 136–145)
SP GR UR REFRACTOMETRY: 1.02 (ref 1–1.02)
URATE SERPL-MCNC: 5.8 MG/DL (ref 2.6–6)
UROBILINOGEN UR QL STRIP.AUTO: 0.2 EU/DL (ref 0.2–1)
WBC # BLD AUTO: 6 K/UL (ref 4.3–11.1)
WBC URNS QL MICRO: ABNORMAL /HPF

## 2022-04-20 PROCEDURE — 88305 TISSUE EXAM BY PATHOLOGIST: CPT

## 2022-04-20 PROCEDURE — 74011250636 HC RX REV CODE- 250/636: Performed by: ANESTHESIOLOGY

## 2022-04-20 PROCEDURE — 82962 GLUCOSE BLOOD TEST: CPT

## 2022-04-20 PROCEDURE — 82533 TOTAL CORTISOL: CPT

## 2022-04-20 PROCEDURE — 77030013996 HC SNR POLYP ENDOSC OCOA -B: Performed by: INTERNAL MEDICINE

## 2022-04-20 PROCEDURE — 76040000026: Performed by: INTERNAL MEDICINE

## 2022-04-20 PROCEDURE — 84550 ASSAY OF BLOOD/URIC ACID: CPT

## 2022-04-20 PROCEDURE — 74011250637 HC RX REV CODE- 250/637: Performed by: INTERNAL MEDICINE

## 2022-04-20 PROCEDURE — 74011250636 HC RX REV CODE- 250/636

## 2022-04-20 PROCEDURE — 77030029929: Performed by: INTERNAL MEDICINE

## 2022-04-20 PROCEDURE — 36415 COLL VENOUS BLD VENIPUNCTURE: CPT

## 2022-04-20 PROCEDURE — 97530 THERAPEUTIC ACTIVITIES: CPT

## 2022-04-20 PROCEDURE — 77030031722: Performed by: INTERNAL MEDICINE

## 2022-04-20 PROCEDURE — 74011000250 HC RX REV CODE- 250: Performed by: NURSE ANESTHETIST, CERTIFIED REGISTERED

## 2022-04-20 PROCEDURE — C1713 ANCHOR/SCREW BN/BN,TIS/BN: HCPCS | Performed by: INTERNAL MEDICINE

## 2022-04-20 PROCEDURE — 76060000032 HC ANESTHESIA 0.5 TO 1 HR: Performed by: INTERNAL MEDICINE

## 2022-04-20 PROCEDURE — 74011000250 HC RX REV CODE- 250: Performed by: INTERNAL MEDICINE

## 2022-04-20 PROCEDURE — 77030009426 HC FCPS BIOP ENDOSC BSC -B: Performed by: INTERNAL MEDICINE

## 2022-04-20 PROCEDURE — 74011250636 HC RX REV CODE- 250/636: Performed by: INTERNAL MEDICINE

## 2022-04-20 PROCEDURE — C9113 INJ PANTOPRAZOLE SODIUM, VIA: HCPCS | Performed by: INTERNAL MEDICINE

## 2022-04-20 PROCEDURE — 76700 US EXAM ABDOM COMPLETE: CPT

## 2022-04-20 PROCEDURE — 0DB78ZX EXCISION OF STOMACH, PYLORUS, VIA NATURAL OR ARTIFICIAL OPENING ENDOSCOPIC, DIAGNOSTIC: ICD-10-PCS | Performed by: INTERNAL MEDICINE

## 2022-04-20 PROCEDURE — 85027 COMPLETE CBC AUTOMATED: CPT

## 2022-04-20 PROCEDURE — 77030020018 HC MRKR ENDOSC SPOT 5ML SYR GISP -B: Performed by: INTERNAL MEDICINE

## 2022-04-20 PROCEDURE — 77030010936 HC CLP LIG BSC -C: Performed by: INTERNAL MEDICINE

## 2022-04-20 PROCEDURE — 74011250636 HC RX REV CODE- 250/636: Performed by: NURSE ANESTHETIST, CERTIFIED REGISTERED

## 2022-04-20 PROCEDURE — 2709999900 HC NON-CHARGEABLE SUPPLY: Performed by: INTERNAL MEDICINE

## 2022-04-20 PROCEDURE — 0DBL8ZZ EXCISION OF TRANSVERSE COLON, VIA NATURAL OR ARTIFICIAL OPENING ENDOSCOPIC: ICD-10-PCS | Performed by: INTERNAL MEDICINE

## 2022-04-20 PROCEDURE — 88312 SPECIAL STAINS GROUP 1: CPT

## 2022-04-20 PROCEDURE — 65270000046 HC RM TELEMETRY

## 2022-04-20 PROCEDURE — 80048 BASIC METABOLIC PNL TOTAL CA: CPT

## 2022-04-20 RX ORDER — SODIUM CHLORIDE, SODIUM LACTATE, POTASSIUM CHLORIDE, CALCIUM CHLORIDE 600; 310; 30; 20 MG/100ML; MG/100ML; MG/100ML; MG/100ML
1000 INJECTION, SOLUTION INTRAVENOUS CONTINUOUS
Status: DISCONTINUED | OUTPATIENT
Start: 2022-04-20 | End: 2022-04-20 | Stop reason: HOSPADM

## 2022-04-20 RX ORDER — PROPOFOL 10 MG/ML
INJECTION, EMULSION INTRAVENOUS AS NEEDED
Status: DISCONTINUED | OUTPATIENT
Start: 2022-04-20 | End: 2022-04-20 | Stop reason: HOSPADM

## 2022-04-20 RX ORDER — PROPOFOL 10 MG/ML
INJECTION, EMULSION INTRAVENOUS
Status: DISCONTINUED | OUTPATIENT
Start: 2022-04-20 | End: 2022-04-20 | Stop reason: HOSPADM

## 2022-04-20 RX ORDER — HYDRALAZINE HYDROCHLORIDE 50 MG/1
25 TABLET, FILM COATED ORAL
Status: DISCONTINUED | OUTPATIENT
Start: 2022-04-20 | End: 2022-04-21 | Stop reason: HOSPADM

## 2022-04-20 RX ORDER — PANTOPRAZOLE SODIUM 40 MG/1
40 TABLET, DELAYED RELEASE ORAL
Status: DISCONTINUED | OUTPATIENT
Start: 2022-04-20 | End: 2022-04-21 | Stop reason: HOSPADM

## 2022-04-20 RX ORDER — LIDOCAINE HYDROCHLORIDE 20 MG/ML
INJECTION, SOLUTION EPIDURAL; INFILTRATION; INTRACAUDAL; PERINEURAL AS NEEDED
Status: DISCONTINUED | OUTPATIENT
Start: 2022-04-20 | End: 2022-04-20 | Stop reason: HOSPADM

## 2022-04-20 RX ORDER — DEXTROSE MONOHYDRATE 50 MG/ML
100 INJECTION, SOLUTION INTRAVENOUS CONTINUOUS
Status: DISPENSED | OUTPATIENT
Start: 2022-04-20 | End: 2022-04-20

## 2022-04-20 RX ADMIN — PROPOFOL 30 MG: 10 INJECTION, EMULSION INTRAVENOUS at 11:23

## 2022-04-20 RX ADMIN — LEVOTHYROXINE SODIUM 112 MCG: 0.11 TABLET ORAL at 06:31

## 2022-04-20 RX ADMIN — PHENYLEPHRINE HYDROCHLORIDE 100 MCG: 10 INJECTION INTRAVENOUS at 11:32

## 2022-04-20 RX ADMIN — PHENYLEPHRINE HYDROCHLORIDE 120 MCG: 10 INJECTION INTRAVENOUS at 11:56

## 2022-04-20 RX ADMIN — PHENYLEPHRINE HYDROCHLORIDE 120 MCG: 10 INJECTION INTRAVENOUS at 11:52

## 2022-04-20 RX ADMIN — SODIUM CHLORIDE, SODIUM LACTATE, POTASSIUM CHLORIDE, AND CALCIUM CHLORIDE: 600; 310; 30; 20 INJECTION, SOLUTION INTRAVENOUS at 11:16

## 2022-04-20 RX ADMIN — SODIUM CHLORIDE, PRESERVATIVE FREE 10 ML: 5 INJECTION INTRAVENOUS at 06:31

## 2022-04-20 RX ADMIN — PROPOFOL 140 MCG/KG/MIN: 10 INJECTION, EMULSION INTRAVENOUS at 11:24

## 2022-04-20 RX ADMIN — SODIUM CHLORIDE 40 MG: 9 INJECTION INTRAMUSCULAR; INTRAVENOUS; SUBCUTANEOUS at 09:25

## 2022-04-20 RX ADMIN — SODIUM CHLORIDE, PRESERVATIVE FREE 10 ML: 5 INJECTION INTRAVENOUS at 14:39

## 2022-04-20 RX ADMIN — SODIUM CHLORIDE, PRESERVATIVE FREE 10 ML: 5 INJECTION INTRAVENOUS at 21:51

## 2022-04-20 RX ADMIN — PROPOFOL 50 MG: 10 INJECTION, EMULSION INTRAVENOUS at 11:21

## 2022-04-20 RX ADMIN — ONDANSETRON 4 MG: 4 TABLET, ORALLY DISINTEGRATING ORAL at 15:08

## 2022-04-20 RX ADMIN — SODIUM CHLORIDE, PRESERVATIVE FREE 10 ML: 5 INJECTION INTRAVENOUS at 21:50

## 2022-04-20 RX ADMIN — PHENYLEPHRINE HYDROCHLORIDE 120 MCG: 10 INJECTION INTRAVENOUS at 11:48

## 2022-04-20 RX ADMIN — PHENYLEPHRINE HYDROCHLORIDE 100 MCG: 10 INJECTION INTRAVENOUS at 11:43

## 2022-04-20 RX ADMIN — ACETAMINOPHEN 650 MG: 325 TABLET ORAL at 15:08

## 2022-04-20 RX ADMIN — ASPIRIN 81 MG: 81 TABLET ORAL at 09:24

## 2022-04-20 RX ADMIN — PROPOFOL 30 MG: 10 INJECTION, EMULSION INTRAVENOUS at 11:35

## 2022-04-20 RX ADMIN — DEXTROSE MONOHYDRATE 100 ML/HR: 5 INJECTION, SOLUTION INTRAVENOUS at 15:01

## 2022-04-20 RX ADMIN — LIDOCAINE HYDROCHLORIDE 100 MG: 20 INJECTION, SOLUTION EPIDURAL; INFILTRATION; INTRACAUDAL; PERINEURAL at 11:21

## 2022-04-20 RX ADMIN — PANTOPRAZOLE SODIUM 40 MG: 40 TABLET, DELAYED RELEASE ORAL at 17:13

## 2022-04-20 NOTE — ANESTHESIA POSTPROCEDURE EVALUATION
Procedure(s):  ESOPHAGOGASTRODUODENOSCOPY (EGD)  COLONOSCOPY   ESOPHAGOGASTRODUODENAL (EGD) BIOPSY  ENDOSCOPIC POLYPECTOMY  INJECTION  ENDOSCOPIC BANDING OR LIGATION. total IV anesthesia    Anesthesia Post Evaluation      Multimodal analgesia: multimodal analgesia used between 6 hours prior to anesthesia start to PACU discharge  Patient location during evaluation: PACU  Patient participation: complete - patient participated  Level of consciousness: awake and awake and alert  Pain management: adequate  Airway patency: patent  Anesthetic complications: no  Cardiovascular status: acceptable  Respiratory status: acceptable  Hydration status: acceptable  Post anesthesia nausea and vomiting:  controlled      INITIAL Post-op Vital signs:   Vitals Value Taken Time   /85 04/20/22 1244   Temp 36.1 °C (97 °F) 04/20/22 1210   Pulse 115 04/20/22 1257   Resp 16 04/20/22 1234   SpO2 98 % 04/20/22 1257   Vitals shown include unvalidated device data.

## 2022-04-20 NOTE — OP NOTES
Procedure:  Esophagogastroduodenoscopy    Date of Procedure:  4/20/2022    Patient:  Juan Patterson     1945    Indication:  GI bleed     Sedation:  MAC    Pre-Procedure Physical Exam:    Mental status:  alert and oriented  Airway:  normal oropharyngeal airway and neck mobility  CV:  regular rate and rhythm  Respiratory:  clear to auscultation    Procedure:  A History and Physical has been performed, and patient medication allergies have been reviewed. Risks of perforation, hemorrhage, adverse drug reaction, and aspiration were discussed. Informed consent was obtained for the procedure, including sedation. The patient was placed in the left lateral decubitus position. The heart rate, oxygen saturations, blood pressure, and response to care were monitored throughout the procedure. The gastroscope was passed through the mouth and advanced under direct vision to the second portion of the duodenum. A careful inspection was made as the gastroscope was withdrawn, including a retroflexed view of the proximal stomach. The patient tolerated the procedure well. Findings:     OROPHARYNX: Cords and pyriform recesses appear normal.   ESOPHAGUS: The esophagus is normal. The proximal, mid, and distal portions are normal. The Z-Line is intact. STOMACH: On retroflexion, the flap-valve is classified as Hill Grade II, with a tissue fold at the lesser curvature but with periodic opening and closing around the endoscope. Severe gastritis was seen in the body and antrum. This was characterized by erythematous, friable mucosa and dispersed erosions, of which the largest measures about 4 mm. Biopsies of the body and antrum were obtained for H pylori. DUODENUM: Severe duodenitis was seen in the bulb. This was characterized by erythematous, friable mucosa and dispersed erosions.  The second and third portions appear normal.     Specimen:  Yes    Estimated Blood Loss:  Minimal    Implant:  None Impression:    Severe erosive gastritis and duodenitis. This would certainly contribute to iron deficiency anemia. Plan:  1. Follow-up pathology results. 2. Protonix 40 mg twice daily, taken prior to breakfast and dinner. 3. Avoid NSAIDs and anticoagulation. 4. Proceed with colonoscopy as planned for today.      Signed:  Soco Bautista MD  4/20/2022  11:28 AM

## 2022-04-20 NOTE — PROGRESS NOTES
OT treatment attempted; pt off floor for test/ procedure. Will f/u as schedule/ pt's status allows.     Pamela Mendez OT

## 2022-04-20 NOTE — ANESTHESIA PREPROCEDURE EVALUATION
Anesthetic History   No history of anesthetic complications            Review of Systems / Medical History  Patient summary reviewed and pertinent labs reviewed    Pulmonary  Within defined limits                 Neuro/Psych       CVA: no residual symptoms       Cardiovascular    Hypertension: well controlled          CAD and cardiac stents (2003)    Exercise tolerance: >4 METS  Comments: Denies cardiac problems but endorses ECKERT   GI/Hepatic/Renal     GERD: well controlled           Endo/Other    Diabetes: type 2  Hypothyroidism: well controlled       Other Findings   Comments: Dysphagia       Anesthetic History   No history of anesthetic complications            Review of Systems / Medical History  Patient summary reviewed and pertinent labs reviewed    Pulmonary  Within defined limits                 Neuro/Psych       CVA: no residual symptoms       Cardiovascular    Hypertension: well controlled          CAD and cardiac stents (2003)    Exercise tolerance: >4 METS  Comments: Denies cardiac problems but endorses ECKERT   GI/Hepatic/Renal     GERD: well controlled           Endo/Other    Diabetes         Other Findings   Comments: Dysphagia           Physical Exam    Airway  Mallampati: III  TM Distance: 4 - 6 cm  Neck ROM: normal range of motion   Mouth opening: Normal     Cardiovascular    Rhythm: regular  Rate: normal         Dental  No notable dental hx       Pulmonary  Breath sounds clear to auscultation               Abdominal  GI exam deferred       Other Findings            Anesthetic Plan    ASA: 3  Anesthesia type: total IV anesthesia          Induction: Intravenous  Anesthetic plan and risks discussed with: Patient              Physical Exam    Airway  Mallampati: III  TM Distance: 4 - 6 cm  Neck ROM: normal range of motion   Mouth opening: Normal     Cardiovascular    Rhythm: regular  Rate: normal         Dental  No notable dental hx       Pulmonary  Breath sounds clear to auscultation Abdominal  GI exam deferred       Other Findings            Anesthetic Plan    ASA: 3  Anesthesia type: total IV anesthesia          Induction: Intravenous  Anesthetic plan and risks discussed with: Patient

## 2022-04-20 NOTE — PROGRESS NOTES
Pt sitting up in chair with family member at bedside. On RA. No s/sx of distress noted. Denies any pain. Call light within reach. Will continue to monitor.

## 2022-04-20 NOTE — PROGRESS NOTES
[facility] Pt sitting up in bed; A&O x4. Respirations even and unlabored on room air. Pt denies pain, N/V/D and reports no further needs at this time. Pt working on completing bowel prep. Sodium bicarb D5 45%NS currently infusing at 125 mL/h. Safety measures are in place.

## 2022-04-20 NOTE — PROGRESS NOTES
ACUTE PHYSICAL THERAPY GOALS:  (Developed with and agreed upon by patient and/or caregiver. )  LTG:  (1.)Mr. Marleen Hurtado will move from supine to sit and sit to supine  in bed with INDEPENDENCE within 7 treatment day(s). (2.)Mr. Marleen Hurtado will transfer from bed to chair and chair to bed with INDEPENDENCE using the least restrictive device within 7 treatment day(s). (3.)Mr. Marleen Hurtado will ambulate with MODIFIED INDEPENDENCE for 300 feet with the least restrictive device within 7 treatment day(s). (4.)Mr. Marleen Hurtado will perform standing static and dynamic balance activities x 25 minutes with SUPERVISION to improve safety within 7 treatment day(s). (5.)Mr. Marleen Hurtado will ambulate and/or perform functional activities for  25 consecutive minutes with stable vital signs and no rests required to improve activity tolerance within 7 treatment days. PHYSICAL THERAPY: Daily Note and PM Treatment Day # 2    Brooklynn Cruz is a 78 y.o. male   PRIMARY DIAGNOSIS: RITA (acute kidney injury) ([facility]  RITA (acute kidney injury) ([facility] [N17.9]  Procedure(s) (LRB):  ESOPHAGOGASTRODUODENOSCOPY (EGD) (N/A)  COLONOSCOPY  (N/A)  ESOPHAGOGASTRODUODENAL (EGD) BIOPSY (N/A)  ENDOSCOPIC POLYPECTOMY (N/A)  INJECTION (N/A)  ENDOSCOPIC BANDING OR LIGATION (N/A)  Day of Surgery    ASSESSMENT:     REHAB RECOMMENDATIONS: CURRENT LEVEL OF FUNCTION:  (Most Recently Demonstrated)   Recommendation to date pending progress:  [address] [place] [address] Therapy  Equipment:    To Be Determined Bed Mobility:   Independent  Sit to Stand:   Supervision  Transfers:   Standby Assistance  Gait/Mobility:   Contact Guard Assistance-stand by assistance     ASSESSMENT:  Mr. Marleen Hurtado demonstrates improved gait safety, distance, and independence today compared to initial therapy assessment. Performs bed mobility independently, transfers with supervision. Ambulation in room and hallway with CGA/SBA and cues for pacing, breathing, and gait safety/walker management.  Pt with slow, fairly steady pace with walker and seemed much less impulsive compared to yesterday. Returned to room for seated break, then practiced functional transfers and mobility in room with SBA, no DME, then standing activities at sink. Good progress. Anticipate dc home with HH PT.      SUBJECTIVE:   Mr. Ulysses Anthony states, Walter Meza was a good walk\"    SOCIAL HISTORY/ LIVING ENVIRONMENT: lives alone in 2 level home, no NIM[employer], lives on 1st floor. and has a roommate that lives in separate area Pt has RW he has recently been using due to frequent falls from being sick/dizzy. PLOF I with ADLs.    Home Environment: Apartment  One/Two Story Residence: Two story, live on 1st floor  Living Alone: No  Support Systems: Other Family Member(s)  OBJECTIVE:     PAIN: VITAL SIGNS: LINES/DRAINS:   Pre Treatment: Pain Screen  Pain Scale 1: Numeric (0 - 10)  Pain Intensity 1: 0  Post Treatment: 0/10 Vital Signs  O2 Device: None none  O2 Device: None     MOBILITY: I Mod I S SBA CGA Min Mod Max Total  NT x2 Comments:   Bed Mobility    Rolling [x] [] [] [] [] [] [] [] [] [] []    Supine to Sit [x] [] [] [] [] [] [] [] [] [] []    Scooting [x] [] [] [] [] [] [] [] [] [] []    Sit to Supine [] [] [] [] [] [] [] [] [] [] []    Transfers    Sit to Stand [] [] [x] [] [] [] [] [] [] [] []    Bed to Chair [] [] [x] [] [] [] [] [] [] [] []    Stand to Sit [] [] [x] [] [] [] [] [] [] [] []    I=Independent, Mod I=Modified Independent, S=Supervision, SBA=Standby Assistance, CGA=Contact Guard Assistance,   Min=Minimal Assistance, Mod=Moderate Assistance, Max=Maximal Assistance, Total=Total Assistance, NT=Not Tested    BALANCE: Good Fair+ Fair Fair- Poor NT Comments   Sitting Static [x] [] [] [] [] []    Sitting Dynamic [x] [] [] [] [] []              Standing Static [] [] [x] [] [] []    Standing Dynamic [] [] [x] [] [] []      GAIT: I Mod I S SBA CGA Min Mod Max Total  NT x2 Comments:   Level of Assistance [] [] [] [x] [x] [] [] [] [] [] []    Distance 300 ft    DME Rolling Walker    Gait Quality No major deficits    Weightbearing  Status N/A     I=Independent, Mod I=Modified Independent, S=Supervision, SBA=Standby Assistance, CGA=Contact Guard Assistance,   Min=Minimal Assistance, Mod=Moderate Assistance, Max=Maximal Assistance, Total=Total Assistance, NT=Not Tested    PLAN:   FREQUENCY/DURATION: PT Plan of Care: 3 times/week for duration of hospital stay or until stated goals are met, whichever comes first.  TREATMENT:     TREATMENT:   ($$ Therapeutic Activity: 23-37 mins    )  Therapeutic Activity (28 Minutes): Therapeutic activity included Rolling, Supine to Sit, Scooting, Transfer Training, Ambulation on level ground, Sitting balance , Standing balance and standing static/dynamic activities to improve functional Mobility, Strength, Activity tolerance and balance.     TREATMENT GRID:  N/A    AFTER TREATMENT POSITION/PRECAUTIONS:  Alarm Activated, Chair, Needs within reach and RN notified    INTERDISCIPLINARY COLLABORATION:  RN/PCT and PT/PTA    TOTAL TREATMENT DURATION:  PT Patient Time In/Time Out  Time In: 1322  Time Out: [address] [facility], [facility]

## 2022-04-20 NOTE — PROGRESS NOTES
Pt returned to floor from GI lab. No s/sx of distress noted. Call light within reach. Will continue to monitor.

## 2022-04-20 NOTE — PROGRESS NOTES
Pt resting quietly in bed. Respirations even and unlabored on room air. No further needs reported and no signs of distress noted at this time. Sodium bicarb in D5 45% NS currently infusing at 125 mL/h. Pt remains NPO. Safety measures are in place. SBAR report given to Elly De Leon RN.

## 2022-04-20 NOTE — PROGRESS NOTES
Pt resting in bed awake. Alert and oriented times 3 at this time. On RA. No s/sx of distress noted. Denies any pain. D5 1/2 NS infusing at 125. Encouraged to call for assistance as needed. Call light within reach. Will continue to monitor.

## 2022-04-20 NOTE — PROGRESS NOTES
TRANSFER - OUT REPORT:    Verbal report given to GI RN on Juan Patterson  being transferred to GI lab for ordered procedure       Report consisted of patients Situation, Background, Assessment and   Recommendations(SBAR). Information from the following report(s) SBAR, Kardex, Intake/Output and MAR was reviewed with the receiving nurse. Lines:   Peripheral IV 04/19/22 Left;Posterior Wrist (Active)   Site Assessment Clean, dry, & intact 04/19/22 2350   Phlebitis Assessment 0 04/19/22 2350   Infiltration Assessment 0 04/19/22 2350   Dressing Status Clean, dry, & intact 04/19/22 2350   Dressing Type Tape;Transparent 04/19/22 2350   Hub Color/Line Status Infusing 04/19/22 2350   Alcohol Cap Used Yes 04/19/22 1444        Opportunity for questions and clarification was provided.       Patient transported with:   [facility]

## 2022-04-20 NOTE — OP NOTES
Procedure:  Colonoscopy    Date of Procedure:  4/20/2022    Patient:  Farooq Almonte     1945    Indication:  GI bleed     Sedation:  MAC    Pre-Procedure Exam:    Mental status:  alert and oriented  Airway:  normal oropharyngeal airway and neck mobility  CV:  regular rate and rhythm   Respiratory:  clear to auscultation    Procedure:  A History and Physical has been performed, and patient medication allergies have been reviewed. Risks of perforation, hemorrhage, adverse drug reaction, and aspiration were discussed. Informed consent was obtained for the procedure, including sedation. The patient was placed in the left lateral decubitus position. The heart rate, oxygen saturations, blood pressure, and response to care were monitored throughout the procedure. After performing a rectal exam, the colonoscope was passed through the anus and advanced under direct vision to the cecum, identified by appendiceal orifice and ileocecal valve. The quality of prep was adequate. A careful inspection was made as the colonoscope was withdrawn, including a retroflexed view of the rectum. The patient tolerated the procedure well. Findings:     ANUS:  Anal exam reveals no masses or hemorrhoids. RECTUM:  Internal hemorrhoids were seen in the rectum. Prominent rectal varices are seen in the proximal rectum. No active bleeding is seen. SIGMOID COLON:  Multiple large and small-mouthed diverticula were seen. DESCENDING COLON:  A few small-mouthed diverticula were seen. SPLENIC FLEXURE:  The splenic flexure is normal.   TRANSVERSE COLON:  ORISE Gel was injected subcutaneously as a submucosa lifting agent prior to polypectomy. A 25 mm carpeting polyp is seen. This was removed in piecemeal fashion using a barbed hot snare. Resection and retrieval appeared complete. A single resolution clip was placed to achieve mucosal closure.  Finally, 3 mL of [place] ink was injected distal to the polypectomy site HEPATIC FLEXURE:  The hepatic flexure is normal.   ASCENDING COLON:  A few small-mouthed diverticula were seen. Alonso Pickcelia CECUM:  The appendiceal orifice appears normal. The ileocecal valve appears normal.   TERMINAL ILEUM:  The terminal ileum was not entered. Specimen:  Yes    Estimated Blood Loss:  Minimal    Implant:  None           Impression:    Colon polyps. Diverticulosis. Rectal varices. Though not actively bleeding bezoar plausible source of recent hemorrhage. Internal hemorrhoids. Plan:  1. Follow-up pathology results. 2. Check abdominal ultrasound with Doppler to assess for cirrhosis or portal vein thrombosis. 3. Okay to resume diet today. 4. Avoid anticoagulation. 5. If ongoing rectal bleeding, patient may require a TIPS.       Signed:  Haroon Gibson MD  4/20/2022  12:03 PM

## 2022-04-20 NOTE — PROGRESS NOTES
Patrick Roca  Admission Date: 4/18/2022         [facility] Progress Note: 4/20/2022    Follow-up for: The patient's chart is reviewed and the patient is discussed with the staff. Subjective:   Pt seen and examined in room. Pt feeling well today, no complaints, for colonoscopy today. ROS:  No CP, no SOB, no edema     Current Facility-Administered Medications   Medication Dose Route Frequency    sodium chloride (NS) flush 5-10 mL  5-10 mL IntraVENous Q8H    sodium chloride (NS) flush 5-10 mL  5-10 mL IntraVENous PRN    aspirin delayed-release tablet 81 mg  81 mg Oral DAILY    levothyroxine (SYNTHROID) tablet 112 mcg  112 mcg Oral ACB    rosuvastatin (CRESTOR) tablet 20 mg  20 mg Oral QHS    sodium chloride (NS) flush 5-40 mL  5-40 mL IntraVENous Q8H    sodium chloride (NS) flush 5-40 mL  5-40 mL IntraVENous PRN    acetaminophen (TYLENOL) tablet 650 mg  650 mg Oral Q6H PRN    Or    acetaminophen (TYLENOL) suppository 650 mg  650 mg Rectal Q6H PRN    ondansetron (ZOFRAN ODT) tablet 4 mg  4 mg Oral Q8H PRN    Or    ondansetron (ZOFRAN) injection 4 mg  4 mg IntraVENous Q6H PRN    insulin lispro (HUMALOG) injection   SubCUTAneous AC&HS    pantoprazole (PROTONIX) 40 mg in 0.9% sodium chloride 10 mL injection  40 mg IntraVENous DAILY         Objective:     Vitals:    04/20/22 0002 04/20/22 0403 04/20/22 0404 04/20/22 0746   BP:  (!) 150/76  (!) 152/78   Pulse: 87 83 82 80   Resp:  19  18   Temp:  97.7 °F (36.5 °C)  97.7 °F (36.5 °C)   SpO2:  100%     Weight:  88.9 kg (196 lb)     Height:         Intake and Output:   04/18 1901 - 04/20 0700  In: 1580 [P.O.:1580]  Out: -   No intake/output data recorded.     Physical Exam:   Constitutional:  the patient is well developed and in no acute distress  HEENT:  Sclera clear, pupils equal, oral mucosa moist  Lungs: clear bilaterally, on RA  Cardiovascular:  RRR no rub  Abd/GI: soft and non-tender; with positive bowel sounds. Ext: warm without cyanosis. There is no lower leg edema. Skin:  no jaundice or rashes, no wounds         LAB  Recent Labs     04/20/22  0351 04/19/22  0325 04/18/22  1323   WBC 6.0 8.2 10.8   HGB 11.6* 11.6* 13.4*   HCT 33.8* 34.9* 40.1*    150 168     Recent Labs     04/20/22  0351 04/19/22  1105 04/19/22  0325 04/18/22  2356 04/18/22  1323 04/18/22  1323   *  --  144 142   < > 137   K 3.9  --  5.1 5.0   < > 5.2*   *  --  119* 117*   < > 108*   CO2 23  --  12* 14*   < > 14*   *  --  75 85   < > 126*   BUN 67*  --  100* 101*   < > 114*   CREA 1.50  --  4.00* 4.70*   < > 7.70*   MG  --   --   --   --   --  2.2   PHOS  --  4.7*  --   --   --   --    ALB  --   --   --   --   --  4.0    < > = values in this interval not displayed. No results for input(s): PH, PCO2, PO2, HCO3 in the last 72 hours.       Assessment:  (Medical Decision Making)     Hospital Problems  Date Reviewed: 4/7/2022          Codes Class Noted POA    * (Principal) [facility] ICD-10-CM: N17.9  ICD-9-CM: 584.9  4/18/2022 Yes        Gastroenteritis ICD-10-CM: K52.9  ICD-9-CM: 558.9  4/18/2022 Yes        Hyperkalemia ICD-10-CM: E87.5  ICD-9-CM: 276.7  4/18/2022 Yes        Hematuria, unspecified ICD-10-CM: R31.9  ICD-9-CM: 599.70  4/18/2022 Yes        Anemia ICD-10-CM: D64.9  ICD-9-CM: 285.9  4/18/2022 Yes        Metabolic acidosis URJ-46-VO: E87.2  ICD-9-CM: 276.2  4/18/2022 Yes        Diastolic dysfunction (Chronic) ICD-10-CM: I51.89  ICD-9-CM: 429.9  4/18/2022 Yes        Postsurgical hypothyroidism ICD-10-CM: E89.0  ICD-9-CM: 244.0  10/23/2019 Yes        Controlled type 2 diabetes mellitus with diabetic neuropathy, without long-term current use of insulin (HCC) ICD-10-CM: E11.40  ICD-9-CM: 250.60, 357.2  5/15/2019 Yes        Mixed hyperlipidemia ICD-10-CM: E78.2  ICD-9-CM: 272.2  5/15/2019 Yes        Coronary artery disease involving native coronary artery of native heart without angina pectoris ICD-10-CM: I25.10  ICD-9-CM: 414.01  5/15/2019 Yes        Essential hypertension (Chronic) ICD-10-CM: I10  ICD-9-CM: 401.9  1/3/2018 Yes              Plan:  (Medical Decision Making)   1. RITA likely pre renal azotemia in the setting of N/V/D, hypotension. Med hx significant for mobic   -renal US, abd CT revealed non obstructing right upper pole renal stone, normal echogenicity no hydronephrosis. Baseline Cr 0.8-1.0  Admitting Cr 7.70-. 4.70->4.00  UA with mod blood, 10-20 RBCs, P/C ratio 0.2, uric acid pending, CK unremarkable  -no indication for acute RRT at this time, trend renal indices with strict I&O  -continue to hold lasix  -renal indices significantly improved today, will add d5 for a liter today since pt is NPO for colonscopy     2. Metabolic acidosis - resolved currently     3. DM type II- SSI per hosp  hold metformin      4. Abd tenderness, N/V/D- tolerating liquids this AM  -work up per hosp     5. Hypertension- hold antihypertensives with hypotension     6. Hx hypothyroidism, dysphagia s/p esophageal dilatation, KARI    Will sign off with continued renal recovery. Please feel free to contact us with any additional or acute concerns.     Fred Castañeda NP  [facility]

## 2022-04-20 NOTE — PROGRESS NOTES
TRANSFER - IN REPORT:    Verbal report received from Katharina Ryan (name) on Fiona Grow  being received from [phone] (unit) for ordered procedure      Report consisted of patients Situation, Background, Assessment and   Recommendations(SBAR). Information from the following report(s) SBAR was reviewed with the receiving nurse. Opportunity for questions and clarification was provided. Assessment completed upon patients arrival to unit and care assumed.

## 2022-04-20 NOTE — PROGRESS NOTES
Hospitalist Progress Note   Admit Date:  2022  1:09 PM   Name:  Jones Samuel   Age:  78 y.o. Sex:  male  :  1945   MRN:  [id number]   Room:  3/    Presenting Complaint: Vomiting    Reason(s) for Admission: RITA (acute kidney injury) [facility]) [N17.9]     Hospital Course & Interval History:   Please refer to the admission H&P for details of presentation. In summary, Jones Samuel is a 78 y.o. male with medical history significant for  BPH followed by urology (states needs biopsy of prostate), DM2, HTN, CAD, LVDD who is evaluated with ongoing weakness and found to have RITA. Patient also had multiple episodes of dark emesis and melanotic stools. Subjective/24 hr Events (22) : Patient is seen and examined at bedside. No acute events reported overnight by nursing staff. Reports he is able to take some PO intake this AM without n/v. No longer having any emesis and melanotic stools. Patient denies fever, chills, chest pains, shortness of breath, n/v, abdominal pain. Review of Systems: 10 point review of systems is otherwise negative with the exception of the elements mentioned above. Assessment & Plan: RITA with metabolic acidosis  Hyperkalemia has resolved  Creatinine of 7.0 on admission baseline less than 1.  22: Cr down to 1.5 on fluids  - nephrology consulted.  Appreciate recs  - continue fluids and changed to D5 1/2 NS due to high Na  - hold benazepril, mobic, metformin, inspra   - dc on bicarb drip    Anemia with concern for GI Bleed  Dark emesis + melena and +ve stool occult test  EGD with erosive gastritis and duodenitis  Colonoscopy with recal varices and internal hemorrhoids  Hb stable at 11.6  - PPI BID  - Full liquids today  - US Abd to access liver and portal htn  - appreciate GI's recs  - no AC    Essential hypertension // HLD // CAD  BP elevated  - Holding benazepril, inspra, lasix with RITA  - crestor, [facility] [place]  - add Hydralazine PRN    T2DM: SSI      Postsurgical hypothyroidism : Continued synthroid         Gastroenteritis - Resolved   - FULL liquids to advance as tolerant        Hematuria // BPH - followup urology out pt      Diet:  ADULT DIET Full Liquid  DVT PPx: SCDs  Code status: Full Code      I have reviewed and ordered clinical lab tests and independently visualized images, tracing. I spent 35 minutes of time caring for this patient at bedside or nearby, more than 50 percent of which was spent on coordination of care and/or counseling regarding the disease process, treatment options, and treatment plan.         Hospital Problems as of 4/20/2022 Date Reviewed: 4/7/2022          Codes Class Noted - Resolved POA    Erosive gastritis ICD-10-CM: K29.60  ICD-9-CM: 535.40  4/20/2022 - Present Unknown        Duodenitis ICD-10-CM: K29.80  ICD-9-CM: 535.60  4/20/2022 - Present Unknown        Rectal varices ICD-10-CM: K64.9  ICD-9-CM: 455.6  4/20/2022 - Present Unknown        * (Principal) IRTA (acute kidney injury) ([facility] [address] ICD-10-CM: N17.9  ICD-9-CM: 584.9  4/18/2022 - Present Yes        Gastroenteritis ICD-10-CM: K52.9  ICD-9-CM: 558.9  4/18/2022 - Present Yes        Hyperkalemia ICD-10-CM: E87.5  ICD-9-CM: 276.7  4/18/2022 - Present Yes        Hematuria, unspecified ICD-10-CM: R31.9  ICD-9-CM: 599.70  4/18/2022 - Present Yes        Anemia ICD-10-CM: D64.9  ICD-9-CM: 285.9  4/18/2022 - Present Yes        Metabolic acidosis ECS-61-QY: E87.2  ICD-9-CM: 276.2  4/18/2022 - Present Yes        Diastolic dysfunction (Chronic) ICD-10-CM: I51.89  ICD-9-CM: 429.9  4/18/2022 - Present Yes        Postsurgical hypothyroidism ICD-10-CM: E89.0  ICD-9-CM: 244.0  10/23/2019 - Present Yes        Controlled type 2 diabetes mellitus with diabetic neuropathy, without long-term current use of insulin (HCC) ICD-10-CM: E11.40  ICD-9-CM: 250.60, 357.2  5/15/2019 - Present Yes        Mixed hyperlipidemia ICD-10-CM: E78.2  ICD-9-CM: 272.2  5/15/2019 - Present Yes        Coronary artery disease involving native coronary artery of native heart without angina pectoris ICD-10-CM: I25.10  ICD-9-CM: 414.01  5/15/2019 - Present Yes        Essential hypertension (Chronic) ICD-10-CM: I10  ICD-9-CM: 401.9  1/3/2018 - Present Yes              Objective:     Patient Vitals for the past 24 hrs:   Temp Pulse Resp BP SpO2   04/20/22 1539 -- 95 18 (!) 156/90 98 %   04/20/22 1523 97.5 °F (36.4 °C) 100 17 (!) 157/83 99 %   04/20/22 1325 97.6 °F (36.4 °C) (!) 119 -- (!) 138/98 100 %   04/20/22 1318 -- 98 -- -- --   04/20/22 1234 -- (!) 113 16 (!) 166/84 97 %   04/20/22 1225 -- (!) 112 16 (!) 150/84 99 %   04/20/22 1214 -- (!) 110 16 137/76 100 %   04/20/22 1210 97 °F (36.1 °C) (!) 118 14 107/74 100 %   04/20/22 1204 -- (!) 116 14 107/74 100 %   04/20/22 0953 97.8 °F (36.6 °C) (!) 104 12 (!) 173/81 100 %   04/20/22 0746 97.7 °F (36.5 °C) 80 18 (!) 152/78 --   04/20/22 0404 -- 82 -- -- --   04/20/22 0403 97.7 °F (36.5 °C) 83 19 (!) 150/76 100 %   04/20/22 0002 -- 87 -- -- --   04/19/22 2323 97.3 °F (36.3 °C) 82 19 (!) 144/75 100 %   04/19/22 1955 98.8 °F (37.1 °C) 85 18 122/62 98 %     Oxygen Therapy  O2 Sat (%): 98 % (04/20/22 1539)  Pulse via Oximetry: 113 beats per minute (04/20/22 1234)  O2 Device: None (04/20/22 1350)  O2 Flow Rate (L/min): 6 l/min (04/20/22 1204)    Estimated body mass index is 29.8 kg/m² as calculated from the following:    Height as of this encounter: 5' 8\" (1.727 m). Weight as of this encounter: 88.9 kg (196 lb). Intake/Output Summary (Last 24 hours) at 4/20/2022 1629  Last data filed at 4/20/2022 1201  Gross per 24 hour   Intake 1400 ml   Output 100 ml   Net 1300 ml         Physical Exam:     Blood pressure (!) 156/90, pulse 95, temperature 97.5 °F (36.4 °C), resp. rate 18, height 5' 8\" (1.727 m), weight 88.9 kg (196 lb), SpO2 98 %. General:    Well nourished.   No overt distress  Head:  Normocephalic, atraumatic  Eyes:  Sclerae appear normal.  Pupils equally round.  ENT:  Nares appear normal, no drainage. Moist oral mucosa  Neck:  No restricted ROM. Trachea midline   CV:   RRR. No m/r/g. No jugular venous distension. Lungs:   CTAB. No wheezing, Respirations even, unlabored  Abdomen: Bowel sounds present. Soft, nontender, nondistended. Extremities: No cyanosis or clubbing. No edema  Skin:     No rashes and normal coloration. Warm and dry. Neuro:  CN II-XII grossly intact. .  A&Ox3  Psych:  Normal mood and affect. I have reviewed ordered lab tests and independently visualized imaging below:    Recent Labs:  Recent Results (from the past 48 hour(s))   COVID-19 RAPID TEST    Collection Time: 04/18/22  7:05 PM   Result Value Ref Range    Specimen source Nasopharyngeal      COVID-19 rapid test Not detected NOTD     OCCULT BLOOD, STOOL    Collection Time: 04/18/22  8:34 PM   Result Value Ref Range    Occult blood, stool Positive (A) NEG     GLUCOSE, POC    Collection Time: 04/18/22  8:55 PM   Result Value Ref Range    Glucose (POC) 84 65 - 100 mg/dL    Performed by [facility]    EKG, 12 LEAD, INITIAL    Collection Time: 04/18/22  9:04 PM   Result Value Ref Range    Ventricular Rate 89 BPM    Atrial Rate 89 BPM    P-R Interval 172 ms    QRS Duration 100 ms    Q-T Interval 370 ms    QTC Calculation (Bezet) 450 ms    Calculated P Axis 61 degrees    Calculated R Axis 62 degrees    Calculated T Axis 53 degrees    Diagnosis       Normal sinus rhythm  Normal ECG  When compared with ECG of 27-FEB-2012 10:07,  Vent.  rate has increased BY  37 BPM  QT has lengthened  Confirmed by [facility] NISHANT ALVARADO (), DEON MCKAY ([id number]) on 4/19/2022 9:03:41 AM     SARS-COV-2    Collection Time: 04/18/22 10:40 PM   Result Value Ref Range    SARS-CoV-2 by PCR Please find results under separate order     COVID-19 RAPID TEST    Collection Time: 04/18/22 10:40 PM   Result Value Ref Range    Specimen source Nasopharyngeal      COVID-19 rapid test Not detected NOTD     METABOLIC PANEL, BASIC    Collection Time: 04/18/22 11:56 PM   Result Value Ref Range    Sodium 142 136 - 145 mmol/L    Potassium 5.0 3.5 - 5.1 mmol/L    Chloride 117 (H) 98 - 107 mmol/L    CO2 14 (L) 21 - 32 mmol/L    Anion gap 11 7 - 16 mmol/L    Glucose 85 65 - 100 mg/dL     (H) 8 - 23 MG/DL    Creatinine 4.70 (H) 0.8 - 1.5 MG/DL    GFR est AA 16 (L) >60 ml/min/1.73m2    GFR est non-AA 13 (L) >60 ml/min/1.73m2    Calcium 8.3 8.3 - 10.4 MG/DL   SARS-COV-2    Collection Time: 04/19/22 12:43 AM   Result Value Ref Range    SARS-CoV-2 by PCR Please find results under separate order     SARS-COV-2, PCR    Collection Time: 04/19/22 12:43 AM    Specimen: Nasopharyngeal   Result Value Ref Range    Specimen source Nasopharyngeal      SARS-CoV-2 by PCR Not detected NOTD     METABOLIC PANEL, BASIC    Collection Time: 04/19/22  3:25 AM   Result Value Ref Range    Sodium 144 136 - 145 mmol/L    Potassium 5.1 3.5 - 5.1 mmol/L    Chloride 119 (H) 98 - 107 mmol/L    CO2 12 (L) 21 - 32 mmol/L    Anion gap 13 7 - 16 mmol/L    Glucose 75 65 - 100 mg/dL     (H) 8 - 23 MG/DL    Creatinine 4.00 (H) 0.8 - 1.5 MG/DL    GFR est AA 19 (L) >60 ml/min/1.73m2    GFR est non-AA 16 (L) >60 ml/min/1.73m2    Calcium 8.2 (L) 8.3 - 10.4 MG/DL   CBC WITH AUTOMATED DIFF    Collection Time: 04/19/22  3:25 AM   Result Value Ref Range    WBC 8.2 4.3 - 11.1 K/uL    RBC 3.73 (L) 4.23 - 5.6 M/uL    HGB 11.6 (L) 13.6 - 17.2 g/dL    HCT 34.9 (L) 41.1 - 50.3 %    MCV 93.6 79.6 - 97.8 FL    MCH 31.1 26.1 - 32.9 PG    MCHC 33.2 31.4 - 35.0 g/dL    RDW 14.6 11.9 - 14.6 %    PLATELET 309 074 - 258 K/uL    MPV 12.4 (H) 9.4 - 12.3 FL    ABSOLUTE NRBC 0.00 0.0 - 0.2 K/uL    DF AUTOMATED      NEUTROPHILS 65 43 - 78 %    LYMPHOCYTES 26 13 - 44 %    MONOCYTES 8 4.0 - 12.0 %    EOSINOPHILS 1 0.5 - 7.8 %    BASOPHILS 1 0.0 - 2.0 %    IMMATURE GRANULOCYTES 0 0.0 - 5.0 %    ABS. NEUTROPHILS 5.3 1.7 - 8.2 K/UL    ABS. LYMPHOCYTES 2.1 0.5 - 4.6 K/UL    ABS.  MONOCYTES 0.7 0.1 - 1.3 K/UL    ABS. EOSINOPHILS 0.1 0.0 - 0.8 K/UL    ABS. BASOPHILS 0.1 0.0 - 0.2 K/UL    ABS. IMM. GRANS. 0.0 0.0 - 0.5 K/UL   GLUCOSE, POC    Collection Time: 04/19/22  7:47 AM   Result Value Ref Range    Glucose (POC) 79 65 - 100 mg/dL    Performed by [employer]    LACTIC ACID    Collection Time: 04/19/22 11:05 AM   Result Value Ref Range    Lactic acid 1.0 0.4 - 2.0 MMOL/L   CK    Collection Time: 04/19/22 11:05 AM   Result Value Ref Range    CK 62 21 - 215 U/L   PHOSPHORUS    Collection Time: 04/19/22 11:05 AM   Result Value Ref Range    Phosphorus 4.7 (H) 2.3 - 3.7 MG/DL   GLUCOSE, POC    Collection Time: 04/19/22 11:11 AM   Result Value Ref Range    Glucose (POC) 109 (H) 65 - 100 mg/dL    [facility] by [facility]    URINALYSIS W/ RFLX MICROSCOPIC    Collection Time: 04/19/22  2:59 PM   Result Value Ref Range    Color YELLOW      Appearance CLEAR      Specific gravity 1.025 (H) 1.001 - 1.023      pH (UA) 6.0 5.0 - 9.0      Protein Negative NEG mg/dL    Glucose 100 mg/dL    Ketone Negative NEG mg/dL    Bilirubin SMALL AMOUNT (A) NEG      Blood MODERATE (A) NEG      Urobilinogen 0.2 0.2 - 1.0 EU/dL    Nitrites Negative NEG      Leukocyte Esterase Negative NEG      WBC 0-3 0 /hpf    RBC 10-20 0 /hpf    Epithelial cells 0 0 /hpf    Bacteria 0 0 /hpf    Casts 0-3 0 /lpf   CREATININE, UR, RANDOM    Collection Time: 04/19/22  2:59 PM   Result Value Ref Range    Creatinine, urine 115.00 mg/dL   SODIUM, UR, RANDOM    Collection Time: 04/19/22  2:59 PM   Result Value Ref Range    Sodium,urine random 31 MMOL/L   PROTEIN/CREATININE RATIO, URINE    Collection Time: 04/19/22  2:59 PM   Result Value Ref Range    Protein, urine random 26 (H) <11.9 mg/dL    Creatinine, urine 112.00 mg/dL    Protein/Creat.  urine Ratio 0.2     URINE MICROSCOPIC    Collection Time: 04/19/22  2:59 PM   Result Value Ref Range    Mucus 0 0 /lpf   GLUCOSE, POC    Collection Time: 04/19/22  6:15 PM   Result Value Ref Range    Glucose (POC) 106 (H) 65 - 100 mg/dL    Performed by [facility]en    GLUCOSE, POC    Collection Time: 04/19/22  8:45 PM   Result Value Ref Range    Glucose (POC) 125 (H) 65 - 100 mg/dL    Performed by Sanket    PLEASE READ & DOCUMENT PPD TEST IN 24 HRS    Collection Time: 04/19/22 10:07 PM   Result Value Ref Range    PPD Negative Negative    mm Induration 0 0 - 5 mm   CBC W/O DIFF    Collection Time: 04/20/22  3:51 AM   Result Value Ref Range    WBC 6.0 4.3 - 11.1 K/uL    RBC 3.69 (L) 4.23 - 5.6 M/uL    HGB 11.6 (L) 13.6 - 17.2 g/dL    HCT 33.8 (L) 41.1 - 50.3 %    MCV 91.6 79.6 - 97.8 FL    MCH 31.4 26.1 - 32.9 PG    MCHC 34.3 31.4 - 35.0 g/dL    RDW 14.7 (H) 11.9 - 14.6 %    PLATELET 584 178 - 534 K/uL    MPV 12.2 9.4 - 12.3 FL    ABSOLUTE NRBC 0.00 0.0 - 0.2 K/uL   METABOLIC PANEL, BASIC    Collection Time: 04/20/22  3:51 AM   Result Value Ref Range    Sodium 148 (H) 136 - 145 mmol/L    Potassium 3.9 3.5 - 5.1 mmol/L    Chloride 119 (H) 98 - 107 mmol/L    CO2 23 21 - 32 mmol/L    Anion gap 6 (L) 7 - 16 mmol/L    Glucose 150 (H) 65 - 100 mg/dL    BUN 67 (H) 8 - 23 MG/DL    Creatinine 1.50 0.8 - 1.5 MG/DL    GFR est AA 59 (L) >60 ml/min/1.73m2    GFR est non-AA 49 (L) >60 ml/min/1.73m2    Calcium 8.4 8.3 - 10.4 MG/DL   CORTISOL, AM    Collection Time: 04/20/22  3:51 AM   Result Value Ref Range    Cortisol, a.m. 14.9 7 - 25 ug/dL   GLUCOSE, POC    Collection Time: 04/20/22  9:53 AM   Result Value Ref Range    Glucose (POC) 108 (H) 65 - 100 mg/dL    Performed by Bob        All Micro Results     Procedure Component Value Units Date/Time    CULTURE, URINE [[id number]] Collected: 04/18/22 1517    Order Status: Completed Specimen: Urine from Clean catch Updated: 04/20/22 0749     Special Requests: NO SPECIAL REQUESTS        Culture result: NO GROWTH 1 DAY       SARS-COV-2, PCR [[id number]] Collected: 04/19/22 0043    Order Status: Completed Specimen: Nasopharyngeal Updated: 04/19/22 0756 Specimen source Nasopharyngeal        SARS-CoV-2 by PCR Not detected        Comment:      The specimen is NEGATIVE for SARS-CoV-2, the novel coronavirus associated with COVID-19. This test has been authorized by the FDA under an Emergency Use Authorization (EUA) for use by authorized laboratories. Fact sheet for Healthcare Providers: [employer]       Fact sheet for Patients: [email or web address]       Methodology: RT-PCR         COVID-19 RAPID TEST [[id number]] Collected: 04/18/22 2240    Order Status: Completed Specimen: Nasopharyngeal Updated: 04/18/22 2311     Specimen source Nasopharyngeal        COVID-19 rapid test Not detected        Comment:      The specimen is NEGATIVE for SARS-CoV-2, the novel coronavirus associated with COVID-19. A negative result does not rule out COVID-19. This test has been authorized by the FDA under an Emergency Use Authorization (EUA) for use by authorized laboratories. Fact sheet for Healthcare Providers: [employer]  Fact sheet for Patients: [email or web address]       Methodology: Isothermal Nucleic Acid Amplification         COVID-19 RAPID TEST [[id number]] Collected: 04/18/22 1905    Order Status: Completed Specimen: Nasopharyngeal Updated: 04/18/22 1948     Specimen source Nasopharyngeal        COVID-19 rapid test Not detected        Comment:      The specimen is NEGATIVE for SARS-CoV-2, the novel coronavirus associated with COVID-19. A negative result does not rule out COVID-19. This test has been authorized by the FDA under an Emergency Use Authorization (EUA) for use by authorized laboratories.         Fact sheet for Healthcare Providers: [employer]  Fact sheet for Patients: [employer].       Methodology: Isothermal Nucleic Acid Amplification               Other Studies:  No results found.    Current Meds:  Current Facility-Administered Medications   Medication Dose Route Frequency    dextrose 5% infusion  100 mL/hr IntraVENous CONTINUOUS    pantoprazole (PROTONIX) tablet 40 mg  40 mg Oral ACB&D    sodium chloride (NS) flush 5-10 mL  5-10 mL IntraVENous Q8H    sodium chloride (NS) flush 5-10 mL  5-10 mL IntraVENous PRN    aspirin delayed-release tablet 81 mg  81 mg Oral DAILY    levothyroxine (SYNTHROID) tablet 112 mcg  112 mcg Oral ACB    rosuvastatin (CRESTOR) tablet 20 mg  20 mg Oral QHS    sodium chloride (NS) flush 5-40 mL  5-40 mL IntraVENous Q8H    sodium chloride (NS) flush 5-40 mL  5-40 mL IntraVENous PRN    acetaminophen (TYLENOL) tablet 650 mg  650 mg Oral Q6H PRN    Or    acetaminophen (TYLENOL) suppository 650 mg  650 mg Rectal Q6H PRN    ondansetron (ZOFRAN ODT) tablet 4 mg  4 mg Oral Q8H PRN    Or    ondansetron (ZOFRAN) injection 4 mg  4 mg IntraVENous Q6H PRN    insulin lispro (HUMALOG) injection   SubCUTAneous AC&HS       Signed:  Mai Denson MD    Part of this note may have been written by using a voice dictation software. The note has been proof read but may still contain some grammatical/other typographical errors.

## 2022-04-21 VITALS
OXYGEN SATURATION: 100 % | HEART RATE: 96 BPM | BODY MASS INDEX: 29.7 KG/M2 | RESPIRATION RATE: 18 BRPM | HEIGHT: 68 IN | DIASTOLIC BLOOD PRESSURE: 83 MMHG | WEIGHT: 196 LBS | SYSTOLIC BLOOD PRESSURE: 152 MMHG | TEMPERATURE: 97.8 F

## 2022-04-21 LAB
ALBUMIN SERPL ELPH-MCNC: 3.4 G/DL (ref 2.9–4.4)
ALBUMIN/GLOB SERPL: 1.3 {RATIO} (ref 0.7–1.7)
ALPHA1 GLOB SERPL ELPH-MCNC: 0.2 G/DL (ref 0–0.4)
ALPHA2 GLOB SERPL ELPH-MCNC: 0.8 G/DL (ref 0.4–1)
ANION GAP SERPL CALC-SCNC: 7 MMOL/L (ref 7–16)
B-GLOBULIN SERPL ELPH-MCNC: 0.9 G/DL (ref 0.7–1.3)
BACTERIA SPEC CULT: NORMAL
BUN SERPL-MCNC: 39 MG/DL (ref 8–23)
CALCIUM SERPL-MCNC: 8 MG/DL (ref 8.3–10.4)
CHLORIDE SERPL-SCNC: 117 MMOL/L (ref 98–107)
CO2 SERPL-SCNC: 24 MMOL/L (ref 21–32)
CREAT SERPL-MCNC: 0.9 MG/DL (ref 0.8–1.5)
ERYTHROCYTE [DISTWIDTH] IN BLOOD BY AUTOMATED COUNT: 14.7 % (ref 11.9–14.6)
GAMMA GLOB SERPL ELPH-MCNC: 0.8 G/DL (ref 0.4–1.8)
GLOBULIN SER-MCNC: 2.7 G/DL (ref 2.2–3.9)
GLUCOSE BLD STRIP.AUTO-MCNC: 94 MG/DL (ref 65–100)
GLUCOSE BLD STRIP.AUTO-MCNC: 94 MG/DL (ref 65–100)
GLUCOSE SERPL-MCNC: 101 MG/DL (ref 65–100)
HCT VFR BLD AUTO: 30.4 % (ref 41.1–50.3)
HGB BLD-MCNC: 10.3 G/DL (ref 13.6–17.2)
IGA SERPL-MCNC: 381 MG/DL (ref 61–437)
IGG SERPL-MCNC: 919 MG/DL (ref 603–1613)
IGM SERPL-MCNC: 18 MG/DL (ref 15–143)
INTERPRETATION SERPL IEP-IMP: NORMAL
M PROTEIN SERPL ELPH-MCNC: NORMAL G/DL
MCH RBC QN AUTO: 30.8 PG (ref 26.1–32.9)
MCHC RBC AUTO-ENTMCNC: 33.9 G/DL (ref 31.4–35)
MCV RBC AUTO: 91 FL (ref 79.6–97.8)
NRBC # BLD: 0 K/UL (ref 0–0.2)
PLATELET # BLD AUTO: 137 K/UL (ref 150–450)
PMV BLD AUTO: 11.9 FL (ref 9.4–12.3)
POTASSIUM SERPL-SCNC: 3.6 MMOL/L (ref 3.5–5.1)
PROT SERPL-MCNC: 6.1 G/DL (ref 6–8.5)
RBC # BLD AUTO: 3.34 M/UL (ref 4.23–5.6)
SERVICE CMNT-IMP: NORMAL
SODIUM SERPL-SCNC: 148 MMOL/L (ref 136–145)
WBC # BLD AUTO: 6.4 K/UL (ref 4.3–11.1)

## 2022-04-21 PROCEDURE — 74011250637 HC RX REV CODE- 250/637: Performed by: INTERNAL MEDICINE

## 2022-04-21 PROCEDURE — 85027 COMPLETE CBC AUTOMATED: CPT

## 2022-04-21 PROCEDURE — 82962 GLUCOSE BLOOD TEST: CPT

## 2022-04-21 PROCEDURE — 36415 COLL VENOUS BLD VENIPUNCTURE: CPT

## 2022-04-21 PROCEDURE — 80048 BASIC METABOLIC PNL TOTAL CA: CPT

## 2022-04-21 PROCEDURE — 74011000250 HC RX REV CODE- 250: Performed by: INTERNAL MEDICINE

## 2022-04-21 RX ORDER — PANTOPRAZOLE SODIUM 40 MG/1
40 TABLET, DELAYED RELEASE ORAL
Qty: 60 TABLET | Refills: 1 | Status: SHIPPED | OUTPATIENT
Start: 2022-04-21 | End: 2022-04-28 | Stop reason: SDUPTHER

## 2022-04-21 RX ORDER — LISINOPRIL 20 MG/1
40 TABLET ORAL ONCE
Status: COMPLETED | OUTPATIENT
Start: 2022-04-21 | End: 2022-04-21

## 2022-04-21 RX ADMIN — ASPIRIN 81 MG: 81 TABLET ORAL at 09:06

## 2022-04-21 RX ADMIN — LEVOTHYROXINE SODIUM 112 MCG: 0.11 TABLET ORAL at 06:32

## 2022-04-21 RX ADMIN — HYDRALAZINE HYDROCHLORIDE 25 MG: 50 TABLET, FILM COATED ORAL at 11:45

## 2022-04-21 RX ADMIN — LISINOPRIL 40 MG: 20 TABLET ORAL at 13:03

## 2022-04-21 RX ADMIN — SODIUM CHLORIDE, PRESERVATIVE FREE 10 ML: 5 INJECTION INTRAVENOUS at 06:33

## 2022-04-21 RX ADMIN — PANTOPRAZOLE SODIUM 40 MG: 40 TABLET, DELAYED RELEASE ORAL at 06:32

## 2022-04-21 NOTE — DISCHARGE INSTRUCTIONS
DISCHARGE SUMMARY from Nurse    PATIENT INSTRUCTIONS:    After general anesthesia or intravenous sedation, for 24 hours or while taking prescription Narcotics:  · Limit your activities  · Do not drive and operate hazardous machinery  · Do not make important personal or business decisions  · Do  not drink alcoholic beverages  · If you have not urinated within 8 hours after discharge, please contact your surgeon on call. Report the following to your surgeon:  · Excessive pain, swelling, redness or odor of or around the surgical area  · Temperature over 100.5  · Nausea and vomiting lasting longer than 4 hours or if unable to take medications  · Any signs of decreased circulation or nerve impairment to extremity: change in color, persistent  numbness, tingling, coldness or increase pain  · Any questions    What to do at Home:  Recommended activity: Activity as tolerated,     If you experience any of the following symptoms shortness of breath not relieved by rest, pain not relieved by medication, chest pain or pressure, increase in swelling, dark colored or bloody stools, nausea, vomiting,diarrhea, or fever greater than 101, please follow up with MD.    *  Please give a list of your current medications to your Primary Care Provider. *  Please update this list whenever your medications are discontinued, doses are      changed, or new medications (including over-the-counter products) are added. *  Please carry medication information at all times in case of emergency situations. These are general instructions for a healthy lifestyle:    No smoking/ No tobacco products/ Avoid exposure to second hand smoke  Surgeon General's Warning:  Quitting smoking now greatly reduces serious risk to your health.     Obesity, smoking, and sedentary lifestyle greatly increases your risk for illness    A healthy diet, regular physical exercise & weight monitoring are important for maintaining a healthy lifestyle    You may be retaining fluid if you have a history of heart failure or if you experience any of the following symptoms:  Weight gain of 3 pounds or more overnight or 5 pounds in a week, increased swelling in our hands or feet or shortness of breath while lying flat in bed. Please call your doctor as soon as you notice any of these symptoms; do not wait until your next office visit. The discharge information has been reviewed with the patient. The patient verbalized understanding. Discharge medications reviewed with the patient and appropriate educational materials and side effects teaching were provided.   ___________________________________________________________________________________________________________________________________

## 2022-04-21 NOTE — DISCHARGE SUMMARY
Hospitalist Discharge Summary   Admit Date:  2022  1:09 PM   DC Note date: 2022  Name:  Pamela Bolden   Age:  78 y.o.   Sex:  male  :  1945   MRN:  [id number]   Room:  [facility]  PCP:  Gregorio Murphy MD    Presenting Complaint: Vomiting    Initial Admission Diagnosis: RITA (acute kidney injury) ([facility] [N17.9]     Problem List for this Hospitalization:  Hospital Problems as of 2022 Date Reviewed: 2022          Codes Class Noted - Resolved POA    Erosive gastritis ICD-10-CM: K29.60  ICD-9-CM: 535.40  2022 - Present Unknown        Duodenitis ICD-10-CM: K29.80  ICD-9-CM: 535.60  2022 - Present Unknown        Rectal varices ICD-10-CM: K64.9  ICD-9-CM: 455.6  2022 - Present Unknown        * (Principal) RITA (acute kidney injury) ([facility] ICD-10-CM: N17.9  ICD-9-CM: 584.9  2022 - Present Yes        Gastroenteritis ICD-10-CM: K52.9  ICD-9-CM: 558.9  2022 - Present Yes        Hyperkalemia ICD-10-CM: E87.5  ICD-9-CM: 276.7  2022 - Present Yes        Hematuria, unspecified ICD-10-CM: R31.9  ICD-9-CM: 599.70  2022 - Present Yes        Anemia ICD-10-CM: D64.9  ICD-9-CM: 285.9  2022 - Present Yes        Metabolic acidosis BST-29-MU: E87.2  ICD-9-CM: 276.2  2022 - Present Yes        Diastolic dysfunction (Chronic) ICD-10-CM: I51.89  ICD-9-CM: 429.9  2022 - Present Yes        Postsurgical hypothyroidism ICD-10-CM: E89.0  ICD-9-CM: 244.0  10/23/2019 - Present Yes        Controlled type 2 diabetes mellitus with diabetic neuropathy, without long-term current use of insulin ([facility] ICD-10-CM: E11.40  ICD-9-CM: 250.60, 357.2  5/15/2019 - Present Yes        Mixed hyperlipidemia ICD-10-CM: E78.2  ICD-9-CM: 272.2  5/15/2019 - Present Yes        Coronary artery disease involving native coronary artery of native heart without angina pectoris ICD-10-CM: I25.10  ICD-9-CM: 414.01  5/15/2019 - Present Yes        Essential hypertension (Chronic) ICD-10-CM: I10  ICD-9-CM: 401.9  1/3/2018 - Present Yes            Did Patient have Sepsis (YES OR NO): No    Hospital Course:  Please refer to the admission H&P for details of presentation. In summary, Hector Bass is a 78 y.o. male with past medical history significant for BPH followed by urology (states needs biopsy of prostate), DM2, HTN, CAD, LVDD who is evaluated with ongoing weakness and found to have RITA. Patient also had multiple episodes of dark emesis and melanotic stools. Patient was noted to have a drop in hemoglobin from 13.4-9.6 with positive Hemoccult test.  GI was consulted and patient underwent EGD and colonoscopy. EGD shows erosive gastritis and duodenitis. Colonoscopy with rectal varices internal hemorrhoids. Patient nausea vomiting has resolved. Patient RITA has resolved with IV fluids. Started on Protonix twice daily and will need to follow-up with GI in 3 to 4 weeks upon discharge. Patient is medically stable for discharge. Patient is to continue taking medications as prescribed and to follow up with PCP on discharge. Patient is instructed to to call a physician or return to ED if any concerns/symptoms worsened. Discharge summary and encounter summary was sent to PCP electronically via \"Comm Mgt\" link in [facility], if possible. Disposition:  Home  Diet: ADULT DIET Regular; GI Lavinia (GERD/Peptic Ulcer)  Code Status: Full Code    Follow Up Orders:  No orders of the defined types were placed in this encounter.       Follow-up Information     Follow up With Specialties Details Why Contact Info    Lani Jimenez MD Family Medicine  As needed [id number]  [phone]      [address] [place] [address] [facility]  In 3 weeks Office will call to schedule appointment [address] [facility] [address] [facility]  [phone]          Follow up labs/diagnostics (ultimately defer to outpatient provider): as above    Time spent in patient discharge and coordination 36 minutes. Plan was discussed with patient. All questions answered. Patient was stable at time of discharge. Instructions given to call a physician or return if any concerns. Discharge Info:   Current Discharge Medication List      START taking these medications    Details   pantoprazole (PROTONIX) 40 mg tablet Take 1 Tablet by mouth Before breakfast and dinner. Qty: 60 Tablet, Refills: 1  Start date: 4/21/2022         CONTINUE these medications which have NOT CHANGED    Details   furosemide (LASIX) 20 mg tablet       levothyroxine (SYNTHROID) 112 mcg tablet Take 1 Tablet by mouth Daily (before breakfast). Qty: 90 Tablet, Refills: 3    Associated Diagnoses: Postsurgical hypothyroidism      methocarbamoL (ROBAXIN) 500 mg tablet Take 1 Tablet by mouth nightly as needed for Muscle Spasm(s). Qty: 30 Tablet, Refills: 5    Associated Diagnoses: Acute bilateral low back pain with right-sided sciatica      meloxicam (MOBIC) 15 mg tablet Take 1 Tablet by mouth daily as needed for Pain. Qty: 90 Tablet, Refills: 1    Associated Diagnoses: Acute bilateral low back pain with right-sided sciatica      metFORMIN (GLUCOPHAGE) 500 mg tablet Take 1 Tablet by mouth two (2) times daily (with meals). Qty: 180 Tablet, Refills: 1    Associated Diagnoses: Controlled type 2 diabetes mellitus with diabetic neuropathy, without long-term current use of insulin ([facility])      rosuvastatin (CRESTOR) 20 mg tablet Take 1 Tablet by mouth nightly. Qty: 90 Tablet, Refills: 1    Associated Diagnoses: Controlled type 2 diabetes mellitus with diabetic neuropathy, without long-term current use of insulin ([facility] Mixed hyperlipidemia; Hyperlipidemia LDL goal <70; Coronary artery disease involving native coronary artery of native heart without angina pectoris      metoprolol succinate (TOPROL-XL) 100 mg tablet Take 1 Tablet by mouth daily.   Qty: 90 Tablet, Refills: 1    Associated Diagnoses: Essential hypertension      benazepriL (LOTENSIN) 40 mg tablet Take 1 Tablet by mouth daily. Qty: 90 Tablet, Refills: 1    Comments: Please disregard Rx for 20mg, dosage increased  Associated Diagnoses: Essential hypertension      allopurinoL (ZYLOPRIM) 300 mg tablet Take 1 Tab by mouth daily. Indications: treatment to prevent acute gout attack  Qty: 90 Tablet, Refills: 1    Associated Diagnoses: Hyperuricemia      glucose blood VI test strips (blood glucose test) strip Check glucose once daily for E11.9  Qty: 100 Strip, Refills: 3    Associated Diagnoses: Controlled type 2 diabetes mellitus with diabetic neuropathy, without long-term current use of insulin ([facility])      lancets misc Use once daily for E11.9  Qty: 100 Each, Refills: 3    Associated Diagnoses: Controlled type 2 diabetes mellitus with diabetic neuropathy, without long-term current use of insulin ([facility])      Lancing Device (Lancing Device with Lancets) misc Use daily for E11.9  Qty: 1 Each, Refills: 1    Associated Diagnoses: Controlled type 2 diabetes mellitus with diabetic neuropathy, without long-term current use of insulin ([facility])      eszopiclone (LUNESTA) 3 mg tablet Take 1 Tab by mouth nightly. Max Daily Amount: 3 mg. Qty: 30 Tab, Refills: 3    Associated Diagnoses: Primary insomnia      eplerenone (INSPRA) 50 mg tab tablet Take 50 mg by mouth daily. clotrimazole-betamethasone (LOTRISONE) topical cream Apply  to affected area two (2) times a day. Qty: 15 g, Refills: 0    Associated Diagnoses: Tinea pedis of left foot      Blood-Glucose Meter monitoring kit Use kit for monitoring diabetes (e11.9)  Qty: 1 Kit, Refills: 0    Associated Diagnoses: Controlled type 2 diabetes mellitus with diabetic neuropathy, without long-term current use of insulin ([facility])      cetirizine (ZYRTEC) 10 mg tablet       aspirin delayed-release 81 mg tablet Take 1 Tab by mouth daily.   Qty: 90 Tab, Refills: 3    Associated Diagnoses: Coronary artery disease involving native coronary artery of native heart without angina pectoris      nitroglycerin (NITROSTAT) 0.4 mg SL tablet 1 Tab by SubLINGual route every five (5) minutes as needed for Chest Pain. 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 EKG Results     Procedure 720 Value Units Date/Time    EKG, 12 LEAD, INITIAL [[id number]] Collected: 04/18/22 2104    Order Status: Completed Updated: 04/19/22 0903     Ventricular Rate 89 BPM      Atrial Rate 89 BPM      P-R Interval 172 ms      QRS Duration 100 ms      Q-T Interval 370 ms      QTC Calculation (Bezet) 450 ms      Calculated P Axis 61 degrees      Calculated R Axis 62 degrees      Calculated T Axis 53 degrees      Diagnosis --     Normal sinus rhythm  Normal ECG  When compared with ECG of 27-FEB-2012 10:07,  Vent. rate has increased BY  37 BPM  QT has lengthened  Confirmed by ST UBALDO DILLARD MD (), DEON MCKAY ([id number]) on 4/19/2022 9:03:41 AM            Diagnostic Imaging/Tests:   CT ABD PELV WO CONT    Result Date: 4/18/2022  CT abdomen and pelvis without contrast CLINICAL INDICATION: One week of worsening severe vomiting, multiple falls, pain involving both flanks and the low back, dizziness and shortness of breath. Follow-up of left hydronephrosis and ureteral stone. History also includes hypertension, coronary artery disease, GERD, high cholesterol, diabetes type 2, gout, arthritis, rectal polyps.  COMPARISON: August 22, 2012 TECHNIQUE: Dose reduction technique used: Automated exposure Control and/or adjustment of mA and kV according to patient size. Multiple contiguous axial CT images were obtained through the abdomen and pelvis without intravenous or oral contrast. Coronal reformatted images are obtained to further evaluate organs. FINDINGS: Partially included lung bases demonstrate no consolidation or effusion. There are tiny peripheral areas of pleural thickening and/or tiny nodules along the periphery of both bases, measuring 3 mm or less. There are partially visualized coronary artery and aortic calcifications, as well as bilateral gynecomastia. Abdomen: Previous left hydronephrosis and ureteral stones have resolved. There is a right posterior collecting system renal stone measuring about 1.4 cm, not significantly changed. Gallbladder has been removed in the interval, without evidence of surrounding inflammation or biliary dilatation. There is no hydronephrosis. There is no evidence of renal mass on limited non-contrast evaluation. No discrete lesions are seen in the noncontrasted visualized portions of the liver or spleen. No suspicious adrenal or pancreas lesions. No free air. No focal inflammatory changes or fluid collections. Aorta normal caliber. Diverticulosis is noted of the large bowel. There are questionable scattered areas of wall thickening in the large bowel versus underdistention artifact. No evidence of bowel obstruction, hernia, or appendicitis. GI evaluation is limited given no oral contrast. Pelvis CT: Urinary bladder is decompressed, with nonspecific wall thickening that could be underdistention artifact or cystitis. Prostate is borderline enlarged. There are no distal ureteral or bladder calculi. No focal inflammatory changes or fluid collections in the pelvis. No evidence of lymphadenopathy. Bones: No acute osseous lesion. 1. No hydronephrosis or ureteral calculus. 2. Chronic nonobstructing right renal stone. 3. Cholecystectomy. US RETROPERITONEUM COMP    Result Date: 4/19/2022  EXAMINATION: US RETROPERITONEUM COMP HISTORY: RITA. TECHNIQUE: Grayscale and limited color Doppler ultrasound of the kidneys and bladder. COMPARISON: CT abdomen and pelvis dated 4/18/2022 FINDINGS: The right kidney measures 9.9 cm. There is normal cortical thickness and echogenicity. There is no evidence of hydronephrosis. No solid or cystic renal lesion is demonstrated. There is a 1.1 cm shadowing structure with twinkle artifact in the upper pole. This likely corresponds to the renal stone seen on prior CT. The left kidney measures 10.3 cm. There is normal cortical thickness and echogenicity. There is no evidence of hydronephrosis. No solid or cystic renal lesion is demonstrated. The inferior vena cava and aorta are visualized. Bladder is not visualized. The patient voided just prior to this examination. Nonobstructing right upper pole renal stone. Otherwise unremarkable examination of the kidneys. All Micro Results     Procedure Component Value Units Date/Time    CULTURE, URINE [[id number]] Collected: 04/18/22 1517    Order Status: Completed Specimen: Urine from Clean catch Updated: 04/21/22 0722     Special Requests: NO SPECIAL REQUESTS        Culture result:       <10,000 COLONIES/mL NORMAL SKIN LUIS ENRIQUE ISOLATED          SARS-COV-2, PCR [[id number]] Collected: 04/19/22 0043    Order Status: Completed Specimen: Nasopharyngeal Updated: 04/19/22 0756     Specimen source Nasopharyngeal        SARS-CoV-2 by PCR Not detected        Comment:      The specimen is NEGATIVE for SARS-CoV-2, the novel coronavirus associated with COVID-19. This test has been authorized by the FDA under an Emergency Use Authorization (EUA) for use by authorized laboratories.         Fact sheet for Healthcare Providers: ConventionUpdate.co.nz       Fact sheet for Patients: ConventionUpdate.co.nz       Methodology: RT-PCR         COVID-19 RAPID TEST [[id number]] Collected: 04/18/22 2240    Order Status: Completed Specimen: Nasopharyngeal Updated: 04/18/22 2311     Specimen source Nasopharyngeal        COVID-19 rapid test Not detected        Comment:      The specimen is NEGATIVE for SARS-CoV-2, the novel coronavirus associated with COVID-19. A negative result does not rule out COVID-19. This test has been authorized by the FDA under an Emergency Use Authorization (EUA) for use by authorized laboratories. Fact sheet for Healthcare Providers: iTendency.uy  Fact sheet for Patients: iTendency.uy       Methodology: Isothermal Nucleic Acid Amplification         COVID-19 RAPID TEST [[id number]] Collected: 04/18/22 1905    Order Status: Completed Specimen: Nasopharyngeal Updated: 04/18/22 1948     Specimen source Nasopharyngeal        COVID-19 rapid test Not detected        Comment:      The specimen is NEGATIVE for SARS-CoV-2, the novel coronavirus associated with COVID-19. A negative result does not rule out COVID-19. This test has been authorized by the FDA under an Emergency Use Authorization (EUA) for use by authorized laboratories.         Fact sheet for Healthcare Providers: iTendency.uy  Fact sheet for Patients: iTendency.uy       Methodology: Isothermal Nucleic Acid Amplification               Labs: Results:       BMP, Mg, Phos Recent Labs     04/21/22  0303 04/20/22  0351 04/19/22  1105 04/19/22  0325 04/18/22  2356 04/18/22  1323   * 148*  --  144   < > 137   K 3.6 3.9  --  5.1   < > 5.2*   * 119*  --  119*   < > 108*   CO2 24 23  --  12*   < > 14*   AGAP 7 6*  --  13   < > 15   BUN 39* 67*  --  100*   < > 114*   CREA 0.90 1.50  --  4.00*   < > 7.70*   CA 8.0* 8.4  --  8.2*   < > 9.3   * 150*  --  75   < > 126*   MG  --   --   --   --   --  2.2   PHOS  --   --  4.7*  --   --   --     < > = values in this interval not displayed. CBC Recent Labs     04/21/22  0303 04/20/22  0351 04/19/22  0325 04/18/22  1323 04/18/22  1323   WBC 6.4 6.0 8.2   < > 10.8   RBC 3.34* 3.69* 3.73*   < > 4.27   HGB 10.3* 11.6* 11.6*   < > 13.4*   HCT 30.4* 33.8* 34.9*   < > 40.1*   * 157 150   < > 168   GRANS  --   --  65  --  64   LYMPH  --   --  26  --  26   EOS  --   --  1  --  0*   MONOS  --   --  8  --  9   BASOS  --   --  1  --  1   IG  --   --  0  --  0   ANEU  --   --  5.3  --  6.9   ABL  --   --  2.1  --  2.8   MARGY  --   --  0.1  --  0.0   ABM  --   --  0.7  --  1.0   ABB  --   --  0.1  --  0.1   AIG  --   --  0.0  --  0.0    < > = values in this interval not displayed.       LFT Recent Labs     04/18/22  1323   ALT 27   AP 73   TP 7.4   ALB 4.0   GLOB 3.4   AGRAT 1.2      Cardiac Testing Lab Results   Component Value Date/Time    CK 62 04/19/2022 11:05 AM      Coagulation Tests Lab Results   Component Value Date/Time    Prothrombin time 10.7 02/27/2012 09:37 AM    INR 1.0 02/27/2012 09:37 AM    aPTT 26.4 02/27/2012 09:37 AM      A1c Lab Results   Component Value Date/Time    Hemoglobin A1c 6.1 (H) 01/26/2022 10:22 AM    Hemoglobin A1c 6.0 (H) 07/21/2021 01:51 PM    Hemoglobin A1c 6.5 (H) 05/15/2020 08:48 AM      Lipid Panel Lab Results   Component Value Date/Time    Cholesterol, total 128 01/26/2022 10:22 AM    HDL Cholesterol 54 01/26/2022 10:22 AM    LDL, calculated 54 01/26/2022 10:22 AM    LDL, calculated 50 05/15/2020 08:48 AM    VLDL, calculated 20 01/26/2022 10:22 AM    VLDL, calculated 24 05/15/2020 08:48 AM    Triglyceride 107 01/26/2022 10:22 AM      Thyroid Panel Lab Results   Component Value Date/Time    TSH 0.713 04/07/2022 09:35 AM    TSH 0.986 01/26/2022 10:22 AM    TSH 0.816 10/07/2021 09:10 AM    TSH 0.780 05/15/2020 08:48 AM        Most Recent UA Lab Results   Component Value Date/Time    Color YELLOW 04/19/2022 02:59 PM    Appearance CLEAR 04/19/2022 02:59 PM    pH (UA) 6.0 04/19/2022 02:59 PM Protein Negative 04/19/2022 02:59 PM    Glucose 100 04/19/2022 02:59 PM    Ketone Negative 04/19/2022 02:59 PM    Bilirubin SMALL AMOUNT (A) 04/19/2022 02:59 PM    Blood MODERATE (A) 04/19/2022 02:59 PM    Urobilinogen 0.2 04/19/2022 02:59 PM    Nitrites Negative 04/19/2022 02:59 PM    Leukocyte Esterase Negative 04/19/2022 02:59 PM    WBC 0-3 04/19/2022 02:59 PM    RBC 10-20 04/19/2022 02:59 PM    Epithelial cells 0 04/19/2022 02:59 PM    Bacteria 0 04/19/2022 02:59 PM    Casts 0-3 04/19/2022 02:59 PM    Crystals, urine 0 08/22/2012 09:10 PM    Mucus 0 04/19/2022 02:59 PM          All Labs from Last 24 Hrs:  Recent Results (from the past 24 hour(s))   GLUCOSE, POC    Collection Time: 04/20/22  4:47 PM   Result Value Ref Range    Glucose (POC) 119 (H) 65 - 100 mg/dL    Performed by Marina ([place])    GLUCOSE, POC    Collection Time: 04/20/22  8:22 PM   Result Value Ref Range    Glucose (POC) 141 (H) 65 - 100 mg/dL    Performed by Sanket    CBC W/O DIFF    Collection Time: 04/21/22  3:03 AM   Result Value Ref Range    WBC 6.4 4.3 - 11.1 K/uL    RBC 3.34 (L) 4.23 - 5.6 M/uL    HGB 10.3 (L) 13.6 - 17.2 g/dL    HCT 30.4 (L) 41.1 - 50.3 %    MCV 91.0 79.6 - 97.8 FL    MCH 30.8 26.1 - 32.9 PG    MCHC 33.9 31.4 - 35.0 g/dL    RDW 14.7 (H) 11.9 - 14.6 %    PLATELET 990 (L) 580 - 450 K/uL    MPV 11.9 9.4 - 12.3 FL    ABSOLUTE NRBC 0.00 0.0 - 0.2 K/uL   METABOLIC PANEL, BASIC    Collection Time: 04/21/22  3:03 AM   Result Value Ref Range    Sodium 148 (H) 136 - 145 mmol/L    Potassium 3.6 3.5 - 5.1 mmol/L    Chloride 117 (H) 98 - 107 mmol/L    CO2 24 21 - 32 mmol/L    Anion gap 7 7 - 16 mmol/L    Glucose 101 (H) 65 - 100 mg/dL    BUN 39 (H) 8 - 23 MG/DL    Creatinine 0.90 0.8 - 1.5 MG/DL    GFR est AA >60 >60 ml/min/1.73m2    GFR est non-AA >60 >60 ml/min/1.73m2    Calcium 8.0 (L) 8.3 - 10.4 MG/DL   GLUCOSE, POC    Collection Time: 04/21/22  7:41 AM   Result Value Ref Range    Glucose (POC) 94 65 - 100 mg/dL Performed by [id number] List in Hospital:   Current Facility-Administered Medications   Medication Dose Route Frequency    pantoprazole (PROTONIX) tablet 40 mg  40 mg Oral ACB&D    hydrALAZINE (APRESOLINE) tablet 25 mg  25 mg Oral QID PRN    sodium chloride (NS) flush 5-10 mL  5-10 mL IntraVENous Q8H    sodium chloride (NS) flush 5-10 mL  5-10 mL IntraVENous PRN    aspirin delayed-release tablet 81 mg  81 mg Oral DAILY    levothyroxine (SYNTHROID) tablet 112 mcg  112 mcg Oral ACB    rosuvastatin (CRESTOR) tablet 20 mg  20 mg Oral QHS    sodium chloride (NS) flush 5-40 mL  5-40 mL IntraVENous Q8H    sodium chloride (NS) flush 5-40 mL  5-40 mL IntraVENous PRN    acetaminophen (TYLENOL) tablet 650 mg  650 mg Oral Q6H PRN    Or    acetaminophen (TYLENOL) suppository 650 mg  650 mg Rectal Q6H PRN    ondansetron (ZOFRAN ODT) tablet 4 mg  4 mg Oral Q8H PRN    Or    ondansetron (ZOFRAN) injection 4 mg  4 mg IntraVENous Q6H PRN    insulin lispro (HUMALOG) injection   SubCUTAneous AC&HS       Allergies   Allergen Reactions    Pcn [Penicillins] Rash     Immunization History   Administered Date(s) Administered    COVID-19, Moderna, Primary or Immunocompromised Series, MRNA, PF, 100mcg/0.5mL 12/22/2021, 01/21/2022    Influenza High Dose Vaccine PF 11/16/2016, 11/15/2018, 12/22/2021    Influenza Vaccine (Tri) Adjuvanted (>65 Yrs FLUAD TRI 92817) 11/14/2019    Pneumococcal Conjugate (PCV-13) 04/26/2017    Pneumococcal Polysaccharide (PPSV-23) 11/15/2018    TB Skin Test (PPD) Intradermal 04/18/2022    Td 01/01/2008    Zoster Recombinant 11/15/2018       Recent Vital Data:  Patient Vitals for the past 24 hrs:   Temp Pulse Resp BP SpO2   04/21/22 0745 98.1 °F (36.7 °C) 81 15 (!) 160/78 96 %   04/21/22 0400 -- 82 -- -- --   04/21/22 0337 97.7 °F (36.5 °C) 82 18 (!) 139/54 97 %   04/21/22 0000 -- 83 -- -- --   04/20/22 2326 97.6 °F (36.4 °C) 89 19 (!) 161/91 100 %   04/20/22 1955 97.8 °F (36.6 °C) 90 20 (!) 158/90 100 %   04/20/22 1539 -- 95 18 (!) 156/90 98 %   04/20/22 1523 97.5 °F (36.4 °C) 100 17 (!) 157/83 99 %   04/20/22 1325 97.6 °F (36.4 °C) (!) 119 -- (!) 138/98 100 %   04/20/22 1318 -- 98 -- -- --   04/20/22 1234 -- (!) 113 16 (!) 166/84 97 %   04/20/22 1225 -- (!) 112 16 (!) 150/84 99 %   04/20/22 1214 -- (!) 110 16 137/76 100 %   04/20/22 1210 97 °F (36.1 °C) (!) 118 14 107/74 100 %   04/20/22 1204 -- (!) 116 14 107/74 100 %     Oxygen Therapy  O2 Sat (%): 96 % (04/21/22 0745)  Pulse via Oximetry: 113 beats per minute (04/20/22 1234)  O2 Device: None (Room air) (04/21/22 0337)  O2 Flow Rate (L/min): 6 l/min (04/20/22 1204)    Estimated body mass index is 29.8 kg/m² as calculated from the following:    Height as of this encounter: 5' 8\" (1.727 m). Weight as of this encounter: 88.9 kg (196 lb). Intake/Output Summary (Last 24 hours) at 4/21/2022 1116  Last data filed at 4/21/2022 1037  Gross per 24 hour   Intake 310 ml   Output 0 ml   Net 310 ml         Physical Exam:    General:          Well nourished. No overt distress  Head:               Normocephalic, atraumatic  Eyes:               Sclerae appear normal.  Pupils equally round. ENT:                Nares appear normal, no drainage. Moist oral mucosa  Neck:               No restricted ROM. Trachea midline   CV:                  RRR. No m/r/g. No jugular venous distension. Lungs:             CTAB. No wheezing, Respirations even, unlabored  Abdomen: Bowel sounds present. Soft, nontender, nondistended. Extremities:     No cyanosis or clubbing. No edema  Skin:                No rashes and normal coloration. Warm and dry. Neuro:             CN II-XII grossly intact. .  A&Ox3  Psych:             Normal mood and affect. Signed:  Maulik Oropeza MD    Part of this note may have been written by using a voice dictation software.   The note has been proof read but may still contain some grammatical/other typographical errors.

## 2022-04-21 NOTE — PROGRESS NOTES
Pt resting in bed awake. Alert and oriented times 3 at this time. On RA. No s/sx of distress noted. Denies any pain. Encouraged to call for assistance as needed. Call light within reach. Will continue to monitor.

## 2022-04-21 NOTE — PROGRESS NOTES
Pt sitting in chair with family at the bedside. Pt has no complaints of pain or discomfort. Pt is alert and oriented times 3. Pt is on room air with even and unlabored respirations. Pt has D5 infusing at 100ml/h. Will continue to monitor.

## 2022-04-21 NOTE — PROGRESS NOTES
Pt laying in bed with no complaints of pain or discomfort. Pt is alert and oriented times 3. Pt is on room air. Call light is in place, will continue to monitor.

## 2022-04-21 NOTE — PROGRESS NOTES
Pt is alert and oriented times 3. Pt has no complaints of pain or discomfort. Pt is on room air. Call light is in place.

## 2022-04-21 NOTE — PROGRESS NOTES
Discharge instructions and paperwork reviewed with patient. Meds reviewed. Pt voiced understanding. IV removed. E signature obtained. Pt to be discharged when ride arrives.

## 2022-04-21 NOTE — PROGRESS NOTES
Date of admission:  4/18/2022    Today's date:  4/21/2022    CC:     GI bleed     HPI:   Patient denies recurrence of overt bleeding. He is tolerating his diet but does not care for the food. ROS:    Gen: No fevers, no chills   CV:   No chest pain, no SOB    Current Medications:     Current Facility-Administered Medications   Medication Dose Route Frequency    pantoprazole (PROTONIX) tablet 40 mg  40 mg Oral ACB&D    hydrALAZINE (APRESOLINE) tablet 25 mg  25 mg Oral QID PRN    sodium chloride (NS) flush 5-10 mL  5-10 mL IntraVENous Q8H    sodium chloride (NS) flush 5-10 mL  5-10 mL IntraVENous PRN    aspirin delayed-release tablet 81 mg  81 mg Oral DAILY    levothyroxine (SYNTHROID) tablet 112 mcg  112 mcg Oral ACB    rosuvastatin (CRESTOR) tablet 20 mg  20 mg Oral QHS    sodium chloride (NS) flush 5-40 mL  5-40 mL IntraVENous Q8H    sodium chloride (NS) flush 5-40 mL  5-40 mL IntraVENous PRN    acetaminophen (TYLENOL) tablet 650 mg  650 mg Oral Q6H PRN    Or    acetaminophen (TYLENOL) suppository 650 mg  650 mg Rectal Q6H PRN    ondansetron (ZOFRAN ODT) tablet 4 mg  4 mg Oral Q8H PRN    Or    ondansetron (ZOFRAN) injection 4 mg  4 mg IntraVENous Q6H PRN    insulin lispro (HUMALOG) injection   SubCUTAneous AC&HS       Physical Exam:   Vitals:  Visit Vitals  BP (!) 160/78   Pulse 81   Temp 98.1 °F (36.7 °C)   Resp 15   Ht 5' 8\" (1.727 m)   Wt 88.9 kg (196 lb)   SpO2 96%   BMI 29.80 kg/m²     Intake/Output:  No intake/output data recorded. 04/19 1901 - 04/21 0700  In: 1400 [P.O.:1100;  I.V.:300]  Out: 100 [Urine:100]    HEENT:  No scleral icterus, no oral ulcers  Abdomen: Soft, nontender, normoactive bowel sounds  Extremities:  No cyanosis, no leg edema  Neuro:  Alert and oriented to person, place, and time    Data:     Recent Results (from the past 24 hour(s))   GLUCOSE, POC    Collection Time: 04/20/22  9:53 AM   Result Value Ref Range    Glucose (POC) 108 (H) 65 - 100 mg/dL    Performed by DellaNEmily    PLEASE READ & DOCUMENT PPD TEST IN 48 HRS    Collection Time: 04/20/22 11:00 AM   Result Value Ref Range    PPD      mm Induration 0 0 - 5 mm   GLUCOSE, POC    Collection Time: 04/20/22  4:47 PM   Result Value Ref Range    Glucose (POC) 119 (H) 65 - 100 mg/dL    Performed by Marina (Rogers)    GLUCOSE, POC    Collection Time: 04/20/22  8:22 PM   Result Value Ref Range    Glucose (POC) 141 (H) 65 - 100 mg/dL    Performed by Sanket    CBC W/O DIFF    Collection Time: 04/21/22  3:03 AM   Result Value Ref Range    WBC 6.4 4.3 - 11.1 K/uL    RBC 3.34 (L) 4.23 - 5.6 M/uL    HGB 10.3 (L) 13.6 - 17.2 g/dL    HCT 30.4 (L) 41.1 - 50.3 %    MCV 91.0 79.6 - 97.8 FL    MCH 30.8 26.1 - 32.9 PG    MCHC 33.9 31.4 - 35.0 g/dL    RDW 14.7 (H) 11.9 - 14.6 %    PLATELET 278 (L) 742 - 450 K/uL    MPV 11.9 9.4 - 12.3 FL    ABSOLUTE NRBC 0.00 0.0 - 0.2 K/uL   METABOLIC PANEL, BASIC    Collection Time: 04/21/22  3:03 AM   Result Value Ref Range    Sodium 148 (H) 136 - 145 mmol/L    Potassium 3.6 3.5 - 5.1 mmol/L    Chloride 117 (H) 98 - 107 mmol/L    CO2 24 21 - 32 mmol/L    Anion gap 7 7 - 16 mmol/L    Glucose 101 (H) 65 - 100 mg/dL    BUN 39 (H) 8 - 23 MG/DL    Creatinine 0.90 0.8 - 1.5 MG/DL    GFR est AA >60 >60 ml/min/1.73m2    GFR est non-AA >60 >60 ml/min/1.73m2    Calcium 8.0 (L) 8.3 - 10.4 MG/DL   GLUCOSE, POC    Collection Time: 04/21/22  7:41 AM   Result Value Ref Range    Glucose (POC) 94 65 - 100 mg/dL    Performed by SimsLaurenPCA        Impression/Plan:       57-year-old gentleman with history of CAD admitted with acute kidney injury, seen in consultation for acute blood loss anemia with melena and coffee-ground emesis. EGD revealed severe erosive gastroduodenitis. Meloxicam is the likely cause of these findings. Colonoscopy demonstrated diverticulosis throughout the colon, large carpeting transverse colon polyp status post EMR and hemorrhoids.  Based on question of possible rectal varices ultrasound was obtained which demonstrated no convincing evidence of chronic liver disease or portal hypertension. Modest drop in hemoglobin is noted but there has been no recurrence of overt bleeding and this likely represents equilibration. 1. Okay to advance diet today. 2. Strict avoidance of NSAIDs. 3. Protonix 40 mg twice daily, taken prior to breakfast and dinner. 4. Follow-up pathology results. 5. We will sign off, but do not hesitate to contact us for any additional assistance. We will arrange for a GI follow-up office visit in 3-4 weeks. Patient will be contacted by our office.      Signed:  Zaynab Busch MD  4/21/2022  7:54 AM

## 2022-04-21 NOTE — PROGRESS NOTES
Care Management Interventions  PCP Verified by CM: Yes  Physical Therapy Consult: Yes  Occupational Therapy Consult: Yes  Speech Therapy Consult: No  Support Systems: Other Family Member(s)  Confirm Follow Up Transport: Self  The Plan for Transition of Care is Related to the Following Treatment Goals : home  The Patient and/or Patient Representative was Provided with a Choice of Provider and Agrees with the Discharge Plan?: Yes  Name of the Patient Representative Who was Provided with a Choice of Provider and Agrees with the Discharge Plan: patient  Freedom of Choice List was Provided with Basic Dialogue that Supports the Patient's Individualized Plan of Care/Goals, Treatment Preferences and Shares the Quality Data Associated with the Providers?: Yes   Resource Information Provided?: No  Discharge Location  Patient Expects to be Discharged to[de-identified] Home  Patient is discharging home with no needs.

## 2022-04-29 PROBLEM — F43.21 GRIEVING: Status: ACTIVE | Noted: 2022-04-29

## 2022-04-29 PROBLEM — R30.0 DYSURIA: Status: ACTIVE | Noted: 2022-04-29

## 2022-04-29 PROBLEM — G47.00 INSOMNIA: Status: ACTIVE | Noted: 2018-01-03

## 2022-05-11 DIAGNOSIS — E78.2 MIXED HYPERLIPIDEMIA: ICD-10-CM

## 2022-05-11 DIAGNOSIS — E89.0 POSTSURGICAL HYPOTHYROIDISM: ICD-10-CM

## 2022-05-11 DIAGNOSIS — E78.5 HYPERLIPIDEMIA LDL GOAL <70: Primary | ICD-10-CM

## 2022-05-11 DIAGNOSIS — M1A.9XX0 CHRONIC GOUT INVOLVING TOE OF LEFT FOOT WITHOUT TOPHUS, UNSPECIFIED CAUSE: ICD-10-CM

## 2022-05-26 DIAGNOSIS — C73 PAPILLARY THYROID CARCINOMA (HCC): ICD-10-CM

## 2022-05-26 DIAGNOSIS — E89.0 POSTSURGICAL HYPOTHYROIDISM: Primary | ICD-10-CM

## 2022-05-26 LAB
ANION GAP SERPL CALC-SCNC: 10 MMOL/L (ref 7–16)
BUN SERPL-MCNC: 37 MG/DL (ref 8–23)
CALCIUM SERPL-MCNC: 9.6 MG/DL (ref 8.3–10.4)
CHLORIDE SERPL-SCNC: 109 MMOL/L (ref 98–107)
CO2 SERPL-SCNC: 22 MMOL/L (ref 21–32)
CREAT SERPL-MCNC: 1.4 MG/DL (ref 0.8–1.5)
EST. AVERAGE GLUCOSE BLD GHB EST-MCNC: 126 MG/DL
GLUCOSE SERPL-MCNC: 95 MG/DL (ref 65–100)
HBA1C MFR BLD: 6 % (ref 4.2–6.3)
POTASSIUM SERPL-SCNC: 5.2 MMOL/L (ref 3.5–5.1)
SODIUM SERPL-SCNC: 141 MMOL/L (ref 138–145)
TSH, 3RD GENERATION: 0.91 UIU/ML (ref 0.36–3.74)

## 2022-10-10 ENCOUNTER — NURSE ONLY (OUTPATIENT)
Dept: FAMILY MEDICINE CLINIC | Facility: CLINIC | Age: 77
End: 2022-10-10

## 2022-10-10 DIAGNOSIS — Z00.8 ENCOUNTER FOR MEDICAL ASSESSMENT: Primary | ICD-10-CM

## 2022-10-10 RX ORDER — ASPIRIN 81 MG/1
81 TABLET ORAL DAILY
COMMUNITY
Start: 2015-10-01

## 2022-10-10 RX ORDER — EPLERENONE 50 MG/1
1 TABLET, FILM COATED ORAL DAILY
COMMUNITY
Start: 2022-09-28

## 2022-10-10 RX ORDER — TAMSULOSIN HYDROCHLORIDE 0.4 MG/1
1 CAPSULE ORAL DAILY
COMMUNITY
Start: 2022-09-28

## 2022-10-10 RX ORDER — LEVOTHYROXINE SODIUM 112 UG/1
112 TABLET ORAL DAILY
COMMUNITY

## 2022-10-10 RX ORDER — ROSUVASTATIN CALCIUM 20 MG/1
1 TABLET, COATED ORAL NIGHTLY
COMMUNITY
Start: 2022-01-26

## 2022-10-10 RX ORDER — FUROSEMIDE 20 MG/1
1 TABLET ORAL DAILY
COMMUNITY
Start: 2022-09-28

## 2022-10-10 RX ORDER — MELOXICAM 15 MG/1
15 TABLET ORAL DAILY PRN
COMMUNITY
Start: 2022-01-26

## 2022-10-13 ENCOUNTER — OFFICE VISIT (OUTPATIENT)
Dept: FAMILY MEDICINE CLINIC | Facility: CLINIC | Age: 77
End: 2022-10-13
Payer: MEDICARE

## 2022-10-13 DIAGNOSIS — E78.2 MIXED HYPERLIPIDEMIA: ICD-10-CM

## 2022-10-13 DIAGNOSIS — E11.40 CONTROLLED TYPE 2 DIABETES MELLITUS WITH DIABETIC NEUROPATHY, WITHOUT LONG-TERM CURRENT USE OF INSULIN (HCC): Primary | ICD-10-CM

## 2022-10-13 DIAGNOSIS — E79.0 HYPERURICEMIA: ICD-10-CM

## 2022-10-13 DIAGNOSIS — I10 ESSENTIAL (PRIMARY) HYPERTENSION: ICD-10-CM

## 2022-10-13 DIAGNOSIS — E11.40 CONTROLLED TYPE 2 DIABETES MELLITUS WITH DIABETIC NEUROPATHY, WITHOUT LONG-TERM CURRENT USE OF INSULIN (HCC): ICD-10-CM

## 2022-10-13 LAB
ALBUMIN SERPL-MCNC: 3.9 G/DL (ref 3.2–4.6)
ALBUMIN/GLOB SERPL: 1.3 {RATIO} (ref 0.4–1.6)
ALP SERPL-CCNC: 54 U/L (ref 50–136)
ALT SERPL-CCNC: 26 U/L (ref 12–65)
ANION GAP SERPL CALC-SCNC: 8 MMOL/L (ref 2–11)
AST SERPL-CCNC: 22 U/L (ref 15–37)
BASOPHILS # BLD: 0.1 K/UL (ref 0–0.2)
BASOPHILS NFR BLD: 1 % (ref 0–2)
BILIRUB SERPL-MCNC: 0.5 MG/DL (ref 0.2–1.1)
BUN SERPL-MCNC: 26 MG/DL (ref 8–23)
CALCIUM SERPL-MCNC: 9.4 MG/DL (ref 8.3–10.4)
CHLORIDE SERPL-SCNC: 107 MMOL/L (ref 101–110)
CO2 SERPL-SCNC: 26 MMOL/L (ref 21–32)
CREAT SERPL-MCNC: 1.5 MG/DL (ref 0.8–1.5)
DIFFERENTIAL METHOD BLD: ABNORMAL
EOSINOPHIL # BLD: 0.3 K/UL (ref 0–0.8)
EOSINOPHIL NFR BLD: 4 % (ref 0.5–7.8)
ERYTHROCYTE [DISTWIDTH] IN BLOOD BY AUTOMATED COUNT: 13 % (ref 11.9–14.6)
EST. AVERAGE GLUCOSE BLD GHB EST-MCNC: 126 MG/DL
GLOBULIN SER CALC-MCNC: 3 G/DL (ref 2.8–4.5)
GLUCOSE SERPL-MCNC: 100 MG/DL (ref 65–100)
HBA1C MFR BLD: 6 % (ref 4.8–5.6)
HCT VFR BLD AUTO: 40 % (ref 41.1–50.3)
HGB BLD-MCNC: 12.6 G/DL (ref 13.6–17.2)
IMM GRANULOCYTES # BLD AUTO: 0 K/UL (ref 0–0.5)
IMM GRANULOCYTES NFR BLD AUTO: 0 % (ref 0–5)
LYMPHOCYTES # BLD: 2.4 K/UL (ref 0.5–4.6)
LYMPHOCYTES NFR BLD: 28 % (ref 13–44)
MCH RBC QN AUTO: 30.5 PG (ref 26.1–32.9)
MCHC RBC AUTO-ENTMCNC: 31.5 G/DL (ref 31.4–35)
MCV RBC AUTO: 96.9 FL (ref 82–102)
MONOCYTES # BLD: 0.7 K/UL (ref 0.1–1.3)
MONOCYTES NFR BLD: 8 % (ref 4–12)
NEUTS SEG # BLD: 4.9 K/UL (ref 1.7–8.2)
NEUTS SEG NFR BLD: 59 % (ref 43–78)
NRBC # BLD: 0 K/UL (ref 0–0.2)
PLATELET # BLD AUTO: 170 K/UL (ref 150–450)
PMV BLD AUTO: 11.9 FL (ref 9.4–12.3)
POTASSIUM SERPL-SCNC: 5 MMOL/L (ref 3.5–5.1)
PROT SERPL-MCNC: 6.9 G/DL (ref 6.3–8.2)
RBC # BLD AUTO: 4.13 M/UL (ref 4.23–5.6)
SODIUM SERPL-SCNC: 141 MMOL/L (ref 133–143)
URATE SERPL-MCNC: 8.3 MG/DL (ref 2.6–6)
WBC # BLD AUTO: 8.4 K/UL (ref 4.3–11.1)

## 2022-10-13 PROCEDURE — 99214 OFFICE O/P EST MOD 30 MIN: CPT | Performed by: STUDENT IN AN ORGANIZED HEALTH CARE EDUCATION/TRAINING PROGRAM

## 2022-10-13 PROCEDURE — 3078F DIAST BP <80 MM HG: CPT | Performed by: STUDENT IN AN ORGANIZED HEALTH CARE EDUCATION/TRAINING PROGRAM

## 2022-10-13 PROCEDURE — 3044F HG A1C LEVEL LT 7.0%: CPT | Performed by: STUDENT IN AN ORGANIZED HEALTH CARE EDUCATION/TRAINING PROGRAM

## 2022-10-13 PROCEDURE — 1123F ACP DISCUSS/DSCN MKR DOCD: CPT | Performed by: STUDENT IN AN ORGANIZED HEALTH CARE EDUCATION/TRAINING PROGRAM

## 2022-10-13 PROCEDURE — 3074F SYST BP LT 130 MM HG: CPT | Performed by: STUDENT IN AN ORGANIZED HEALTH CARE EDUCATION/TRAINING PROGRAM

## 2022-10-13 RX ORDER — METOPROLOL SUCCINATE 100 MG/1
TABLET, EXTENDED RELEASE ORAL
COMMUNITY
Start: 2022-10-11 | End: 2022-11-14 | Stop reason: SDUPTHER

## 2022-10-13 RX ORDER — BENAZEPRIL HYDROCHLORIDE 20 MG/1
20 TABLET ORAL DAILY
COMMUNITY
Start: 2022-10-11

## 2022-10-13 RX ORDER — METHOCARBAMOL 500 MG/1
500 TABLET, FILM COATED ORAL
COMMUNITY
Start: 2022-01-26

## 2022-10-13 ASSESSMENT — ANXIETY QUESTIONNAIRES
GAD7 TOTAL SCORE: 1
3. WORRYING TOO MUCH ABOUT DIFFERENT THINGS: 0
IF YOU CHECKED OFF ANY PROBLEMS ON THIS QUESTIONNAIRE, HOW DIFFICULT HAVE THESE PROBLEMS MADE IT FOR YOU TO DO YOUR WORK, TAKE CARE OF THINGS AT HOME, OR GET ALONG WITH OTHER PEOPLE: NOT DIFFICULT AT ALL
2. NOT BEING ABLE TO STOP OR CONTROL WORRYING: 0
7. FEELING AFRAID AS IF SOMETHING AWFUL MIGHT HAPPEN: 0
1. FEELING NERVOUS, ANXIOUS, OR ON EDGE: 1
4. TROUBLE RELAXING: 0
6. BECOMING EASILY ANNOYED OR IRRITABLE: 0
5. BEING SO RESTLESS THAT IT IS HARD TO SIT STILL: 0

## 2022-10-13 ASSESSMENT — PATIENT HEALTH QUESTIONNAIRE - PHQ9
SUM OF ALL RESPONSES TO PHQ QUESTIONS 1-9: 1
3. TROUBLE FALLING OR STAYING ASLEEP: 0
4. FEELING TIRED OR HAVING LITTLE ENERGY: 0
1. LITTLE INTEREST OR PLEASURE IN DOING THINGS: 1
5. POOR APPETITE OR OVEREATING: 0
SUM OF ALL RESPONSES TO PHQ QUESTIONS 1-9: 1
10. IF YOU CHECKED OFF ANY PROBLEMS, HOW DIFFICULT HAVE THESE PROBLEMS MADE IT FOR YOU TO DO YOUR WORK, TAKE CARE OF THINGS AT HOME, OR GET ALONG WITH OTHER PEOPLE: 0
2. FEELING DOWN, DEPRESSED OR HOPELESS: 0
SUM OF ALL RESPONSES TO PHQ QUESTIONS 1-9: 1
SUM OF ALL RESPONSES TO PHQ QUESTIONS 1-9: 1
7. TROUBLE CONCENTRATING ON THINGS, SUCH AS READING THE NEWSPAPER OR WATCHING TELEVISION: 0
8. MOVING OR SPEAKING SO SLOWLY THAT OTHER PEOPLE COULD HAVE NOTICED. OR THE OPPOSITE, BEING SO FIGETY OR RESTLESS THAT YOU HAVE BEEN MOVING AROUND A LOT MORE THAN USUAL: 0
SUM OF ALL RESPONSES TO PHQ9 QUESTIONS 1 & 2: 1
9. THOUGHTS THAT YOU WOULD BE BETTER OFF DEAD, OR OF HURTING YOURSELF: 0
6. FEELING BAD ABOUT YOURSELF - OR THAT YOU ARE A FAILURE OR HAVE LET YOURSELF OR YOUR FAMILY DOWN: 0

## 2022-10-13 NOTE — PROGRESS NOTES
[facility]  Alexa Bradshaw  Phone [phone]  Fax:  [phone]    Diane Alegria (:  1945) is a 78 y.o. male here for evaluation of the following chief complaint(s):  Follow-up (No refills)       ASSESSMENT/PLAN:  1. Controlled type 2 diabetes mellitus with diabetic neuropathy, without long-term current use of insulin (HCC)  -     Hemoglobin A1C; Future  -     CBC with Auto Differential; Future  -     Comprehensive Metabolic Panel; Future  2. Hyperuricemia  -     Uric Acid; Future  3. Mixed hyperlipidemia  4. Essential (primary) hypertension    Hemoglobin A1c back in May was 6.0. Diabetes is well controlled, continue metformin. Patient does not know if he is taking allopurinol, will check uric acid level. Blood pressure well controlled, continue benazepril, eplerenone, and Toprol-XL. Continue Crestor for HLD. Will check basic labs today. Return in about 6 months (around 2023) for labs prior, routine f/u. Subjective   SUBJECTIVE/OBJECTIVE:  HPI  82-year-old male with PMH of DM2, HTN, CAD, GERD, papillary thyroid carcinoma, hypothyroidism, and HLD who presents for regular 6-month follow-up. -EGD 2022 showed erosive gastritis and duodenitis, colonoscopy showed internal hemorrhoids, rectal varices, diverticulosis, and tubulovillous adenoma  -Followed by urology for elevated PSA, PSA 2022 was 10.9, plan for prostate biopsy 10/28  -Followed by endocrinology for history of papillary thyroid carcinoma s/p total thyroidectomy   -Followed by cardiology for CAD, saw them 10/11 and reported exertional chest pain, has plan for [facility] 11/3  -Doesn't know if he is taking Allopurinol     Review of Systems       Objective     Vitals:    10/13/22 1354   BP: 118/75   Pulse: 70   Temp: 97 °F (36.1 °C)   SpO2: 97%       Physical Exam  Vitals reviewed. Constitutional:       General: He is not in acute distress. Appearance: He is obese.    HENT: Head: Normocephalic and atraumatic. Cardiovascular:      Rate and Rhythm: Normal rate and regular rhythm. Pulmonary:      Effort: Pulmonary effort is normal.      Breath sounds: Normal breath sounds. Musculoskeletal:      Right lower leg: No edema. Left lower leg: No edema. Skin:     General: Skin is warm and dry. Neurological:      General: No focal deficit present. Mental Status: He is alert and oriented to person, place, and time. An electronic signature was used to authenticate this note.     --Pato Ayala MD

## 2022-10-28 VITALS
HEIGHT: 67 IN | DIASTOLIC BLOOD PRESSURE: 75 MMHG | HEART RATE: 70 BPM | WEIGHT: 198 LBS | TEMPERATURE: 97 F | BODY MASS INDEX: 31.08 KG/M2 | SYSTOLIC BLOOD PRESSURE: 118 MMHG | OXYGEN SATURATION: 97 %

## 2022-11-01 DIAGNOSIS — R89.9 ABNORMAL LABORATORY TEST: ICD-10-CM

## 2022-11-01 DIAGNOSIS — R89.9 ABNORMAL LABORATORY TEST: Primary | ICD-10-CM

## 2022-11-01 LAB
ALBUMIN SERPL-MCNC: 3.8 G/DL (ref 3.2–4.6)
ALBUMIN/GLOB SERPL: 1.2 {RATIO} (ref 0.4–1.6)
ALP SERPL-CCNC: 63 U/L (ref 50–136)
ALT SERPL-CCNC: 25 U/L (ref 12–65)
ANION GAP SERPL CALC-SCNC: 9 MMOL/L (ref 2–11)
AST SERPL-CCNC: 17 U/L (ref 15–37)
BILIRUB SERPL-MCNC: 0.7 MG/DL (ref 0.2–1.1)
BUN SERPL-MCNC: 25 MG/DL (ref 8–23)
CALCIUM SERPL-MCNC: 9.1 MG/DL (ref 8.3–10.4)
CHLORIDE SERPL-SCNC: 106 MMOL/L (ref 101–110)
CO2 SERPL-SCNC: 24 MMOL/L (ref 21–32)
CREAT SERPL-MCNC: 1.6 MG/DL (ref 0.8–1.5)
GLOBULIN SER CALC-MCNC: 3.1 G/DL (ref 2.8–4.5)
GLUCOSE SERPL-MCNC: 94 MG/DL (ref 65–100)
POTASSIUM SERPL-SCNC: 4.1 MMOL/L (ref 3.5–5.1)
PROT SERPL-MCNC: 6.9 G/DL (ref 6.3–8.2)
SODIUM SERPL-SCNC: 139 MMOL/L (ref 133–143)

## 2022-11-14 ENCOUNTER — OFFICE VISIT (OUTPATIENT)
Dept: FAMILY MEDICINE CLINIC | Facility: CLINIC | Age: 77
End: 2022-11-14
Payer: MEDICARE

## 2022-11-14 VITALS
WEIGHT: 198.25 LBS | DIASTOLIC BLOOD PRESSURE: 79 MMHG | SYSTOLIC BLOOD PRESSURE: 142 MMHG | TEMPERATURE: 98.2 F | BODY MASS INDEX: 31.11 KG/M2 | OXYGEN SATURATION: 98 % | HEIGHT: 67 IN | HEART RATE: 90 BPM

## 2022-11-14 DIAGNOSIS — E89.0 POSTSURGICAL HYPOTHYROIDISM: ICD-10-CM

## 2022-11-14 DIAGNOSIS — I10 ESSENTIAL (PRIMARY) HYPERTENSION: Primary | ICD-10-CM

## 2022-11-14 LAB
ANION GAP SERPL CALC-SCNC: 5 MMOL/L (ref 2–11)
BASOPHILS # BLD: 0.1 K/UL (ref 0–0.2)
BASOPHILS NFR BLD: 1 % (ref 0–2)
BUN SERPL-MCNC: 15 MG/DL (ref 8–23)
CALCIUM SERPL-MCNC: 8.9 MG/DL (ref 8.3–10.4)
CHLORIDE SERPL-SCNC: 105 MMOL/L (ref 101–110)
CO2 SERPL-SCNC: 29 MMOL/L (ref 21–32)
CREAT SERPL-MCNC: 1.5 MG/DL (ref 0.8–1.5)
DIFFERENTIAL METHOD BLD: ABNORMAL
EOSINOPHIL # BLD: 0.3 K/UL (ref 0–0.8)
EOSINOPHIL NFR BLD: 4 % (ref 0.5–7.8)
ERYTHROCYTE [DISTWIDTH] IN BLOOD BY AUTOMATED COUNT: 13.3 % (ref 11.9–14.6)
GLUCOSE SERPL-MCNC: 105 MG/DL (ref 65–100)
HCT VFR BLD AUTO: 42.7 % (ref 41.1–50.3)
HGB BLD-MCNC: 13.3 G/DL (ref 13.6–17.2)
IMM GRANULOCYTES # BLD AUTO: 0 K/UL (ref 0–0.5)
IMM GRANULOCYTES NFR BLD AUTO: 0 % (ref 0–5)
LYMPHOCYTES # BLD: 2.2 K/UL (ref 0.5–4.6)
LYMPHOCYTES NFR BLD: 28 % (ref 13–44)
MAGNESIUM SERPL-MCNC: 1.7 MG/DL (ref 1.8–2.4)
MCH RBC QN AUTO: 30.2 PG (ref 26.1–32.9)
MCHC RBC AUTO-ENTMCNC: 31.1 G/DL (ref 31.4–35)
MCV RBC AUTO: 97 FL (ref 82–102)
MONOCYTES # BLD: 0.7 K/UL (ref 0.1–1.3)
MONOCYTES NFR BLD: 8 % (ref 4–12)
NEUTS SEG # BLD: 4.5 K/UL (ref 1.7–8.2)
NEUTS SEG NFR BLD: 59 % (ref 43–78)
NRBC # BLD: 0 K/UL (ref 0–0.2)
PLATELET # BLD AUTO: 251 K/UL (ref 150–450)
PMV BLD AUTO: 11.2 FL (ref 9.4–12.3)
POTASSIUM SERPL-SCNC: 4 MMOL/L (ref 3.5–5.1)
RBC # BLD AUTO: 4.4 M/UL (ref 4.23–5.6)
SODIUM SERPL-SCNC: 139 MMOL/L (ref 133–143)
TSH W FREE THYROID IF ABNORMAL: 0.76 UIU/ML (ref 0.36–3.74)
WBC # BLD AUTO: 7.7 K/UL (ref 4.3–11.1)

## 2022-11-14 PROCEDURE — 99213 OFFICE O/P EST LOW 20 MIN: CPT | Performed by: STUDENT IN AN ORGANIZED HEALTH CARE EDUCATION/TRAINING PROGRAM

## 2022-11-14 PROCEDURE — 3074F SYST BP LT 130 MM HG: CPT | Performed by: STUDENT IN AN ORGANIZED HEALTH CARE EDUCATION/TRAINING PROGRAM

## 2022-11-14 PROCEDURE — 3078F DIAST BP <80 MM HG: CPT | Performed by: STUDENT IN AN ORGANIZED HEALTH CARE EDUCATION/TRAINING PROGRAM

## 2022-11-14 PROCEDURE — 1123F ACP DISCUSS/DSCN MKR DOCD: CPT | Performed by: STUDENT IN AN ORGANIZED HEALTH CARE EDUCATION/TRAINING PROGRAM

## 2022-11-14 RX ORDER — EPLERENONE 50 MG/1
50 TABLET, FILM COATED ORAL DAILY
Qty: 90 TABLET | Refills: 3 | Status: SHIPPED | OUTPATIENT
Start: 2022-11-14

## 2022-11-14 RX ORDER — METOPROLOL SUCCINATE 50 MG/1
50 TABLET, EXTENDED RELEASE ORAL DAILY
Qty: 90 TABLET | Refills: 1 | Status: SHIPPED | OUTPATIENT
Start: 2022-11-14

## 2022-11-14 RX ORDER — ROSUVASTATIN CALCIUM 20 MG/1
20 TABLET, COATED ORAL NIGHTLY
Qty: 90 TABLET | Refills: 3 | Status: SHIPPED | OUTPATIENT
Start: 2022-11-14

## 2022-11-14 NOTE — PROGRESS NOTES
[facility]  Alexa Bradshaw  Phone [phone]  Fax:  [phone]    Quinten Norwood (:  1945) is a 78 y.o. male here for evaluation of the following chief complaint(s):  Follow-up [facility] discharge 10/31/22 for dehydration, feeling good today, cramps in legs)       ASSESSMENT/PLAN:  1. Essential (primary) hypertension  -     Basic Metabolic Panel; Future  -     CBC with Auto Differential; Future  -     Magnesium; Future  2. Postsurgical hypothyroidism  -     TSH with Reflex; Future    Blood pressure borderline high today but patient has been out of his BP meds for unknown duration. Start taking blood pressure medications again, will have him start back at half dose of his Toprol-XL. Recent uric acid elevated, patient does not know if he is taking allopurinol. Continue Synthroid 112 mcg daily for hypothyroidism, check TSH. Will also check BMP, CBC, and magnesium. Return in 4 weeks (on 2022), or if symptoms worsen or fail to improve, for recheck. Subjective   SUBJECTIVE/OBJECTIVE:  HPI  72-year-old male with PMH of DM2, HTN, CAD, GERD, papillary thyroid carcinoma, hypothyroidism, and HLD who presents for ED follow-up.  -Went to the ED 10/31 for hematuria and dizziness, given IV fluids for orthostatic hypotension and suspected prerenal CHAD, troponin negative  -Feeling a little better since he went to the ED, reports he still has some lightheadedness with change in position   -Creatinine on  improved to 1.6  -Denies current hematuria  -Underwent prostate biopsy 10/28, followed by urology for elevated PSA  -Plan for left heart cath   -Patient has no clue how long he was been out of Benazepril and Toprol XL, has been out of Eplerenone for a few weeks    Review of Systems       Objective     Vitals:    22 0936   BP: (!) 142/79   Pulse: 90   Temp: 98.2 °F (36.8 °C)   SpO2: 98%       Physical Exam  Vitals reviewed.    Constitutional:

## 2022-12-13 ENCOUNTER — OFFICE VISIT (OUTPATIENT)
Dept: FAMILY MEDICINE CLINIC | Facility: CLINIC | Age: 77
End: 2022-12-13
Payer: MEDICARE

## 2022-12-13 VITALS
TEMPERATURE: 98 F | SYSTOLIC BLOOD PRESSURE: 112 MMHG | BODY MASS INDEX: 30.85 KG/M2 | OXYGEN SATURATION: 99 % | DIASTOLIC BLOOD PRESSURE: 78 MMHG | HEART RATE: 76 BPM | WEIGHT: 197 LBS

## 2022-12-13 DIAGNOSIS — R59.0 SUBMANDIBULAR LYMPHADENOPATHY: ICD-10-CM

## 2022-12-13 DIAGNOSIS — I10 ESSENTIAL HYPERTENSION: Primary | ICD-10-CM

## 2022-12-13 PROCEDURE — 1123F ACP DISCUSS/DSCN MKR DOCD: CPT | Performed by: STUDENT IN AN ORGANIZED HEALTH CARE EDUCATION/TRAINING PROGRAM

## 2022-12-13 PROCEDURE — 3074F SYST BP LT 130 MM HG: CPT | Performed by: STUDENT IN AN ORGANIZED HEALTH CARE EDUCATION/TRAINING PROGRAM

## 2022-12-13 PROCEDURE — 99213 OFFICE O/P EST LOW 20 MIN: CPT | Performed by: STUDENT IN AN ORGANIZED HEALTH CARE EDUCATION/TRAINING PROGRAM

## 2022-12-13 PROCEDURE — 3078F DIAST BP <80 MM HG: CPT | Performed by: STUDENT IN AN ORGANIZED HEALTH CARE EDUCATION/TRAINING PROGRAM

## 2022-12-13 RX ORDER — ROSUVASTATIN CALCIUM 20 MG/1
20 TABLET, COATED ORAL NIGHTLY
Qty: 90 TABLET | Refills: 3 | Status: SHIPPED | OUTPATIENT
Start: 2022-12-13

## 2022-12-13 RX ORDER — METHOCARBAMOL 500 MG/1
500 TABLET, FILM COATED ORAL NIGHTLY
Qty: 90 TABLET | Refills: 3 | Status: SHIPPED | OUTPATIENT
Start: 2022-12-13

## 2022-12-13 RX ORDER — FUROSEMIDE 20 MG/1
20 TABLET ORAL DAILY
Qty: 90 TABLET | Refills: 3 | Status: SHIPPED | OUTPATIENT
Start: 2022-12-13

## 2022-12-13 RX ORDER — LEVOTHYROXINE SODIUM 112 UG/1
112 TABLET ORAL DAILY
Qty: 90 TABLET | Refills: 3 | Status: SHIPPED | OUTPATIENT
Start: 2022-12-13

## 2022-12-13 RX ORDER — EPLERENONE 50 MG/1
50 TABLET, FILM COATED ORAL DAILY
Qty: 90 TABLET | Refills: 3 | Status: SHIPPED | OUTPATIENT
Start: 2022-12-13

## 2022-12-13 RX ORDER — METOPROLOL SUCCINATE 50 MG/1
50 TABLET, EXTENDED RELEASE ORAL DAILY
Qty: 90 TABLET | Refills: 3 | Status: SHIPPED | OUTPATIENT
Start: 2022-12-13

## 2022-12-13 RX ORDER — TAMSULOSIN HYDROCHLORIDE 0.4 MG/1
0.4 CAPSULE ORAL DAILY
Qty: 90 CAPSULE | Refills: 3 | Status: SHIPPED | OUTPATIENT
Start: 2022-12-13

## 2022-12-13 ASSESSMENT — ENCOUNTER SYMPTOMS
COUGH: 0
RHINORRHEA: 1

## 2022-12-13 NOTE — PROGRESS NOTES
Bhavesh Perez ([facility]) - [phone]  Methocarbamol 500MG tablets       Status: PA Response - Approved    Created: December 13th, 2022    Sent: December 13th, 2022

## 2022-12-13 NOTE — PROGRESS NOTES
[facility]  Alexa Stephens  Phone [phone]  Fax:  [phone]    Khai Guzman (:  1945) is a 78 y.o. male here for evaluation of the following chief complaint(s):  Diabetes, Benign Prostatic Hypertrophy, Hypothyroidism, and Cholesterol Problem (refills)       ASSESSMENT/PLAN:  1. Essential hypertension  2. Submandibular lymphadenopathy    Blood pressure good today. Start taking Toprol XL again. Continue eplerenone. Had normal BMP and CBC 2022. Appears to have some possible submandibular lymphadenopathy on exam.  Patient is well-appearing, could potentially have viral URI given his rhinitis. Advised patient to monitor the submandibular swelling, he is to let me know if it does not resolve. Return if symptoms worsen or fail to improve. Subjective   SUBJECTIVE/OBJECTIVE:  HPI  70-year-old male with PMH of DM2, HTN, CAD, GERD, prostate cancer, papillary thyroid carcinoma, hypothyroidism, and HLD who presents for 4-week blood pressure follow-up. -Never got Toprol XL that was previously ordered  -Taking Eplerenone but has been off of Benazepril, unclear why he stopped it  -Noticed some tender swelling under his right jaw this morning, seems to have improved some  -Followed by cardiology for CAD, underwent left heart cath   -Followed by urology for recently discovered prostate cancer, plans on radiation  -Followed by endocrinology for history of papillary thyroid carcinoma s/p total thyroidectomy     Review of Systems   Constitutional:  Negative for fever. HENT:  Positive for rhinorrhea. Respiratory:  Negative for cough. Objective     Vitals:    22 1138   BP: 112/78   Pulse: 76   Temp: 98 °F (36.7 °C)   SpO2: 99%       Physical Exam  Vitals reviewed. Constitutional:       General: He is not in acute distress. Appearance: He is obese. HENT:      Head: Normocephalic and atraumatic.       Mouth/Throat: Mouth: Mucous membranes are moist.      Pharynx: Oropharynx is clear. Neck:      Comments: Tender bilateral submandibular LAD  Cardiovascular:      Rate and Rhythm: Normal rate and regular rhythm. Pulmonary:      Effort: Pulmonary effort is normal.      Breath sounds: Normal breath sounds. Musculoskeletal:      Right lower leg: No edema. Left lower leg: No edema. Skin:     General: Skin is warm and dry. Neurological:      General: No focal deficit present. Mental Status: He is alert and oriented to person, place, and time. An electronic signature was used to authenticate this note.     --Peter Nicole MD

## 2023-03-02 ENCOUNTER — OFFICE VISIT (OUTPATIENT)
Dept: FAMILY MEDICINE CLINIC | Facility: CLINIC | Age: 78
End: 2023-03-02
Payer: MEDICARE

## 2023-03-02 VITALS
WEIGHT: 196 LBS | HEART RATE: 76 BPM | HEIGHT: 67 IN | TEMPERATURE: 97.4 F | DIASTOLIC BLOOD PRESSURE: 76 MMHG | BODY MASS INDEX: 30.76 KG/M2 | OXYGEN SATURATION: 97 % | SYSTOLIC BLOOD PRESSURE: 118 MMHG

## 2023-03-02 DIAGNOSIS — M54.2 NECK PAIN: Primary | ICD-10-CM

## 2023-03-02 DIAGNOSIS — Z87.2 HISTORY OF PRURITUS OF SKIN: ICD-10-CM

## 2023-03-02 PROCEDURE — 1123F ACP DISCUSS/DSCN MKR DOCD: CPT | Performed by: NURSE PRACTITIONER

## 2023-03-02 PROCEDURE — 3074F SYST BP LT 130 MM HG: CPT | Performed by: NURSE PRACTITIONER

## 2023-03-02 PROCEDURE — 99212 OFFICE O/P EST SF 10 MIN: CPT | Performed by: NURSE PRACTITIONER

## 2023-03-02 PROCEDURE — 3078F DIAST BP <80 MM HG: CPT | Performed by: NURSE PRACTITIONER

## 2023-03-02 SDOH — ECONOMIC STABILITY: HOUSING INSECURITY
IN THE LAST 12 MONTHS, WAS THERE A TIME WHEN YOU DID NOT HAVE A STEADY PLACE TO SLEEP OR SLEPT IN A SHELTER (INCLUDING NOW)?: NO

## 2023-03-02 SDOH — ECONOMIC STABILITY: INCOME INSECURITY: HOW HARD IS IT FOR YOU TO PAY FOR THE VERY BASICS LIKE FOOD, HOUSING, MEDICAL CARE, AND HEATING?: NOT HARD AT ALL

## 2023-03-02 SDOH — ECONOMIC STABILITY: FOOD INSECURITY: WITHIN THE PAST 12 MONTHS, YOU WORRIED THAT YOUR FOOD WOULD RUN OUT BEFORE YOU GOT MONEY TO BUY MORE.: NEVER TRUE

## 2023-03-02 SDOH — ECONOMIC STABILITY: FOOD INSECURITY: WITHIN THE PAST 12 MONTHS, THE FOOD YOU BOUGHT JUST DIDN'T LAST AND YOU DIDN'T HAVE MONEY TO GET MORE.: NEVER TRUE

## 2023-03-02 ASSESSMENT — PATIENT HEALTH QUESTIONNAIRE - PHQ9
2. FEELING DOWN, DEPRESSED OR HOPELESS: 0
SUM OF ALL RESPONSES TO PHQ QUESTIONS 1-9: 0
1. LITTLE INTEREST OR PLEASURE IN DOING THINGS: 0
SUM OF ALL RESPONSES TO PHQ9 QUESTIONS 1 & 2: 0
SUM OF ALL RESPONSES TO PHQ QUESTIONS 1-9: 0

## 2023-03-02 ASSESSMENT — ENCOUNTER SYMPTOMS
DIARRHEA: 0
CONSTIPATION: 0
ABDOMINAL PAIN: 0
NAUSEA: 0
VOMITING: 0
SHORTNESS OF BREATH: 0
BACK PAIN: 1

## 2023-03-02 ASSESSMENT — ANXIETY QUESTIONNAIRES
4. TROUBLE RELAXING: 0
5. BEING SO RESTLESS THAT IT IS HARD TO SIT STILL: 0
1. FEELING NERVOUS, ANXIOUS, OR ON EDGE: 0
2. NOT BEING ABLE TO STOP OR CONTROL WORRYING: 0
3. WORRYING TOO MUCH ABOUT DIFFERENT THINGS: 0
GAD7 TOTAL SCORE: 0
6. BECOMING EASILY ANNOYED OR IRRITABLE: 0
7. FEELING AFRAID AS IF SOMETHING AWFUL MIGHT HAPPEN: 0

## 2023-03-02 NOTE — PROGRESS NOTES
[facility]  [address] [place] [address]  Phone [phone]  Fax:  [phone]    Patient: Scott Cm  YOB: 1945  Patient Age 77 y.o.  Patient sex: male  Medical Record:  [id number]  Visit Date: 03/02/23    [facility] Note     Chief Complaint   Patient presents with    Rash     Bilateral upper legs and lower back    Neck Pain       History of Present Illness:  Mr. Cm is a pleasant 77-year-old male past medical history as noted below who presents with chronic neck pain and lower back and bilateral upper leg rash.  Patient states that approximately a year and a half ago he fell and hit his head and hurt his neck.  He was not evaluated after fall.  He states since then that he has been having intermitted neck pain, last month started to experience increasing neck pain with shooting pain down his back.  Denies any alarming symptoms, denies any new injuries.  Has been taking ibuprofen with relief.  Is able to ambulate and move neck without pain.    He is also complaining of itchy rash to his lower back in her legs that started a year and a half ago after cutting grass.  Upon exam, no rash was noted. Has not tried any OTC meds.        Allergies:  Allergies   Allergen Reactions    Penicillins Rash       Current Medications:   Medications marked \"taking\" at this time:    Current Outpatient Medications:     rosuvastatin (CRESTOR) 20 MG tablet, Take 1 tablet by mouth nightly, Disp: 90 tablet, Rfl: 3    furosemide (LASIX) 20 MG tablet, Take 1 tablet by mouth daily, Disp: 90 tablet, Rfl: 3    eplerenone (INSPRA) 50 MG tablet, Take 1 tablet by mouth daily, Disp: 90 tablet, Rfl: 3    levothyroxine (SYNTHROID) 112 MCG tablet, Take 1 tablet by mouth daily, Disp: 90 tablet, Rfl: 3    tamsulosin (FLOMAX) 0.4 MG capsule, Take 1 capsule by mouth daily, Disp: 90 capsule, Rfl: 3    metFORMIN (GLUCOPHAGE) 500 MG tablet, Take 1 tablet by mouth 2 times daily, Disp: 180  tablet, Rfl: 3    methocarbamol (ROBAXIN) 500 MG tablet, Take 1 tablet by mouth at bedtime, Disp: 90 tablet, Rfl: 3    metoprolol succinate (TOPROL XL) 50 MG extended release tablet, Take 1 tablet by mouth daily, Disp: 90 tablet, Rfl: 3    aspirin 81 MG EC tablet, Take 81 mg by mouth daily, Disp: , Rfl:     benazepril (LOTENSIN) 20 MG tablet, Take 20 mg by mouth daily (Patient not taking: No sig reported), Disp: , Rfl:     Current Problem List:   Patient Active Problem List   Diagnosis    Controlled type 2 diabetes mellitus with diabetic neuropathy, without long-term current use of insulin (HCC)    Hyperlipidemia LDL goal <70    Reflux esophagitis    Esophageal dysphagia    Postsurgical hypothyroidism    Mixed hyperlipidemia    Coronary artery disease involving native coronary artery of native heart without angina pectoris    Essential hypertension    Uncontrolled type 2 diabetes mellitus with complication, without long-term current use of insulin    Hyperuricemia    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    Papillary thyroid carcinoma (HCC)    CHAD (acute kidney injury) (HCC)    Gastroenteritis    Hyperkalemia    Hematuria, unspecified    Anemia    Metabolic acidosis    Diastolic dysfunction    Erosive gastritis    Duodenitis    Rectal varices    Grieving    Dysuria    Insomnia       Social History:   Social History     Tobacco Use    Smoking status: Former     Packs/day: 2.00     Types: Cigarettes     Quit date: 1980     Years since quittin.1    Smokeless tobacco: Never   Substance Use Topics    Alcohol use: No     Alcohol/week: 0.0 standard drinks       Family History:   Family History   Problem Relation Age of Onset    No Known Problems Paternal Grandfather     No Known Problems Paternal Grandmother     No Known Problems Maternal Grandfather     No Known Problems Maternal Grandmother     Cancer Father         prostate cancer    Stroke Father     Colon Cancer Brother     Heart Disease Mother     Thyroid Disease Daughter     Colon Cancer Brother     Heart Attack Paternal Uncle     Prostate Cancer Brother     Heart Disease Father        Surgical History:  Past Surgical History:   Procedure Laterality Date    CARDIAC CATHETERIZATION  2003    One stent     COLONOSCOPY  2016    repeat in 2019    COLONOSCOPY N/A 9/8/2017    COLONOSCOPY/ 34 performed by Adeel Gibbons MD at [facility] N/A 4/20/2022    COLONOSCOPY  performed by Raymond Leach MD at [address] [place] [address]      rectal polyps removed    OTHER SURGICAL HISTORY  2010    esophageal dilation with biopsy    THYROIDECTOMY  07/30/2019    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 Respiratory:  Negative for shortness of breath. Cardiovascular:  Negative for chest pain, palpitations and leg swelling. Gastrointestinal:  Negative for abdominal pain, constipation, diarrhea, nausea and vomiting. Genitourinary:  Negative for dysuria. Musculoskeletal:  Positive for back pain and neck pain. Negative for arthralgias, myalgias and neck stiffness. Skin:  Positive for rash. Neurological:  Negative for dizziness, weakness, light-headedness, numbness and headaches. Psychiatric/Behavioral:  Negative for dysphoric mood and sleep disturbance. The patient is not nervous/anxious. Visit Vitals  /76   Pulse 76   Temp 97.4 °F (36.3 °C)   Ht 5' 7\" (1.702 m)   Wt 196 lb (88.9 kg)   SpO2 97%   BMI 30.70 kg/m²       Physical Exam  Physical Exam  Constitutional:       General: He is not in acute distress. Appearance: Normal appearance. He is not ill-appearing. Neck:      Comments: Upper neck tenderness, no abnormalities noted. Cardiovascular:      Rate and Rhythm: Regular rhythm. Heart sounds: No murmur heard. No gallop.    Pulmonary:      Effort: Pulmonary effort is normal.      Breath sounds: Normal breath sounds. No wheezing or rhonchi. Musculoskeletal:         General: No swelling. Cervical back: Normal range of motion. No edema, erythema, signs of trauma, rigidity or crepitus. No pain with movement or spinous process tenderness. Normal range of motion. Skin:     General: Skin is warm and dry. Findings: No bruising, erythema, lesion or rash. Comments: No rash or lesions noted to lower back, upper thigh areas. Neurological:      Mental Status: He is alert. ASSESSMENT & PLAN      I have reviewed the patient's past medical history, social history and family history and vitals. We have discussed treatment plan and follow up and given patient instructions. Patient's questions are answered and we will follow up as indicated. Elaine Thomson was seen today for rash and neck pain. Diagnoses and all orders for this visit:    Neck pain  -     XR CERVICAL SPINE (4 OR 5 VIEWS); Future    History of pruritus of skin     -Obtain the above imaging for neck pain. Pain is currently controlled with ibuprofen, patient declines muscle relaxer.  -No rash noted to patient's lower back or upper legs. Recommended hydrocortisone cream when this occurs, can also use Benadryl.  -Further recommendations pending above. -Instructed to let office know if symptoms do not improve or worsen in 1 to 2 weeks.         NIESHA Prather - NP

## 2023-03-08 ENCOUNTER — OFFICE VISIT (OUTPATIENT)
Dept: FAMILY MEDICINE CLINIC | Facility: CLINIC | Age: 78
End: 2023-03-08
Payer: MEDICARE

## 2023-03-08 VITALS
SYSTOLIC BLOOD PRESSURE: 110 MMHG | WEIGHT: 191.8 LBS | TEMPERATURE: 97.1 F | OXYGEN SATURATION: 96 % | DIASTOLIC BLOOD PRESSURE: 62 MMHG | BODY MASS INDEX: 30.1 KG/M2 | HEIGHT: 67 IN | HEART RATE: 72 BPM

## 2023-03-08 DIAGNOSIS — Z86.79 HISTORY OF HYPOTENSION: ICD-10-CM

## 2023-03-08 DIAGNOSIS — N17.9 AKI (ACUTE KIDNEY INJURY) (HCC): ICD-10-CM

## 2023-03-08 DIAGNOSIS — R41.0 CONFUSION: ICD-10-CM

## 2023-03-08 DIAGNOSIS — Z91.81 AT HIGH RISK FOR FALLS: ICD-10-CM

## 2023-03-08 DIAGNOSIS — N18.9 CHRONIC KIDNEY DISEASE, UNSPECIFIED CKD STAGE: Primary | ICD-10-CM

## 2023-03-08 PROBLEM — C61 MALIGNANT NEOPLASM OF PROSTATE (HCC): Status: ACTIVE | Noted: 2023-01-25

## 2023-03-08 PROBLEM — R97.20 ELEVATED PSA: Status: ACTIVE | Noted: 2022-09-21

## 2023-03-08 PROBLEM — I20.0 UNSTABLE ANGINA (HCC): Status: ACTIVE | Noted: 2022-10-11

## 2023-03-08 PROCEDURE — 3078F DIAST BP <80 MM HG: CPT | Performed by: NURSE PRACTITIONER

## 2023-03-08 PROCEDURE — 3074F SYST BP LT 130 MM HG: CPT | Performed by: NURSE PRACTITIONER

## 2023-03-08 PROCEDURE — 1123F ACP DISCUSS/DSCN MKR DOCD: CPT | Performed by: NURSE PRACTITIONER

## 2023-03-08 PROCEDURE — 99215 OFFICE O/P EST HI 40 MIN: CPT | Performed by: NURSE PRACTITIONER

## 2023-03-08 ASSESSMENT — ANXIETY QUESTIONNAIRES
GAD7 TOTAL SCORE: 7
3. WORRYING TOO MUCH ABOUT DIFFERENT THINGS: 1
5. BEING SO RESTLESS THAT IT IS HARD TO SIT STILL: 1
2. NOT BEING ABLE TO STOP OR CONTROL WORRYING: 1
6. BECOMING EASILY ANNOYED OR IRRITABLE: 1
4. TROUBLE RELAXING: 1
7. FEELING AFRAID AS IF SOMETHING AWFUL MIGHT HAPPEN: 1
1. FEELING NERVOUS, ANXIOUS, OR ON EDGE: 1
IF YOU CHECKED OFF ANY PROBLEMS ON THIS QUESTIONNAIRE, HOW DIFFICULT HAVE THESE PROBLEMS MADE IT FOR YOU TO DO YOUR WORK, TAKE CARE OF THINGS AT HOME, OR GET ALONG WITH OTHER PEOPLE: SOMEWHAT DIFFICULT

## 2023-03-08 ASSESSMENT — PATIENT HEALTH QUESTIONNAIRE - PHQ9
SUM OF ALL RESPONSES TO PHQ QUESTIONS 1-9: 2
SUM OF ALL RESPONSES TO PHQ9 QUESTIONS 1 & 2: 2
SUM OF ALL RESPONSES TO PHQ QUESTIONS 1-9: 2
SUM OF ALL RESPONSES TO PHQ QUESTIONS 1-9: 2
2. FEELING DOWN, DEPRESSED OR HOPELESS: 1
SUM OF ALL RESPONSES TO PHQ QUESTIONS 1-9: 2
1. LITTLE INTEREST OR PLEASURE IN DOING THINGS: 1

## 2023-03-08 ASSESSMENT — ENCOUNTER SYMPTOMS
VOMITING: 0
SHORTNESS OF BREATH: 0
BACK PAIN: 1
ABDOMINAL PAIN: 1
COLOR CHANGE: 0
NAUSEA: 1
DIARRHEA: 1
BLOOD IN STOOL: 0
WHEEZING: 0
COUGH: 0
CONSTIPATION: 0
ABDOMINAL DISTENTION: 0

## 2023-03-08 NOTE — PROGRESS NOTES
[facility]  Alexa Stephens  Phone [phone]  Fax:  [phone]    Patient: Caitie Marie  YOB: 1945  Patient Age 78 y.o. Patient sex: male  Medical Record:  [id number]  Visit Date: 03/08/23    [facility] Clinic Note     Chief Complaint   Patient presents with    Other     Pt went for pre op  yesterday and his BP bottom number was in the 40s and he has been having dizzy spells     History of Present Illness:  Mr. Jose Love, a 27-year-old male with a past medical history as noted below presents for further evaluation of low blood pressure at preop visit yesterday. According to notes, patient had blood pressures of 86/51 and 84/47. Patient was last seen in office last week by myself for neck pain status post fall that occurred a year and a half ago. Patient states at today's visit that he has actually been experiencing dizziness, \"blacking out\" since this fall a year and a half ago, has progressively worsened. States has not mentioned to providers because \"he knows we are busy and don't have a lot of time. \"  He has also experienced increased confusion, stating he's had \" trouble remembering things recently,\" experiencing this daily. He states last syncopal episode was last night when he almost fell while getting out of the bed. Denies any recent falls. Denies any other associated symptoms including chest pain, palpitations, shortness of breath, numbness, or weakness. He does endorse constant right flank pain, diffuse abdominal pain, difficulty urinating, diarrhea, and nausea. He is not able to tell me how long this has been going on. Patient is poor historian. Is alone at today's visit. Preop visit 3/7/23 note from Paramjit Hidalgo reviewed with lab work. Patient has had a significant decrease in renal function; 3/7/2023 creatinine 3.19 GFR 20. Previous lab work 11/2022 creatinine 1.22 GFR 62. Allergies:   Allergies   Allergen Reactions Penicillins Rash       Current Medications:   Medications marked \"taking\" at this time:    Current Outpatient Medications:     rosuvastatin (CRESTOR) 20 MG tablet, Take 1 tablet by mouth nightly, Disp: 90 tablet, Rfl: 3    furosemide (LASIX) 20 MG tablet, Take 1 tablet by mouth daily, Disp: 90 tablet, Rfl: 3    eplerenone (INSPRA) 50 MG tablet, Take 1 tablet by mouth daily, Disp: 90 tablet, Rfl: 3    levothyroxine (SYNTHROID) 112 MCG tablet, Take 1 tablet by mouth daily, Disp: 90 tablet, Rfl: 3    tamsulosin (FLOMAX) 0.4 MG capsule, Take 1 capsule by mouth daily, Disp: 90 capsule, Rfl: 3    metFORMIN (GLUCOPHAGE) 500 MG tablet, Take 1 tablet by mouth 2 times daily, Disp: 180 tablet, Rfl: 3    methocarbamol (ROBAXIN) 500 MG tablet, Take 1 tablet by mouth at bedtime, Disp: 90 tablet, Rfl: 3    metoprolol succinate (TOPROL XL) 50 MG extended release tablet, Take 1 tablet by mouth daily, Disp: 90 tablet, Rfl: 3    benazepril (LOTENSIN) 20 MG tablet, Take 20 mg by mouth daily (Patient not taking: No sig reported), Disp: , Rfl:     aspirin 81 MG EC tablet, Take 81 mg by mouth daily (Patient not taking: Reported on 3/8/2023), Disp: , Rfl:     Current Problem List:   Patient Active Problem List   Diagnosis    Controlled type 2 diabetes mellitus with diabetic neuropathy, without long-term current use of insulin (HCC)    Hyperlipidemia LDL goal <70    Reflux esophagitis    Esophageal dysphagia    Postsurgical hypothyroidism    Mixed hyperlipidemia    Coronary artery disease involving native coronary artery of native heart without angina pectoris    Essential hypertension    Uncontrolled type 2 diabetes mellitus with complication, without long-term current use of insulin    Hyperuricemia    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    Papillary thyroid carcinoma (HCC)    CHAD (acute kidney injury) ([facility] [address]    Gastroenteritis    Hyperkalemia    Hematuria, unspecified    Anemia    Metabolic acidosis    Diastolic dysfunction    Erosive gastritis    Duodenitis    Rectal varices    Grieving    Dysuria    Insomnia    Chronic kidney disease    Elevated PSA    Malignant neoplasm of prostate (HCC)    Unstable angina (HCC)       Social History:   Social History     Tobacco Use    Smoking status: Former     Packs/day: 2.00     Types: Cigarettes     Quit date: 1980     Years since quittin.2     Passive exposure: Current    Smokeless tobacco: Never   Substance Use Topics    Alcohol use: No     Alcohol/week: 0.0 standard drinks       Family History:   Family History   Problem Relation Age of Onset    No Known Problems Paternal Grandfather     No Known Problems Paternal Grandmother     No Known Problems Maternal Grandfather     No Known Problems Maternal Grandmother     Cancer Father         prostate cancer    Stroke Father     Colon Cancer Brother     Heart Disease Mother     Thyroid Disease Daughter     Colon Cancer Brother     Heart Attack Paternal Uncle     Prostate Cancer Brother     Heart Disease Father        Surgical History:  Past Surgical History:   Procedure Laterality Date    CARDIAC CATHETERIZATION      One stent     COLONOSCOPY      repeat in     COLONOSCOPY N/A 2017    COLONOSCOPY/ 34 performed by Alvin Jones MD at [facility] ENDOSCOPY    COLONOSCOPY N/A 2022    COLONOSCOPY  performed by Diomedes Metzger MD at [address] [place] [address]      rectal polyps removed    OTHER SURGICAL HISTORY  2010    esophageal dilation with biopsy    THYROIDECTOMY  2019    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 HENT:  Negative for congestion and tinnitus. Eyes:  Negative for visual disturbance. Respiratory:  Negative for cough, shortness of breath and wheezing. Cardiovascular:  Negative for chest pain, palpitations and leg swelling.    Gastrointestinal:  Positive for abdominal pain (Diffuse), diarrhea and nausea. Negative for abdominal distention, blood in stool, constipation and vomiting. Genitourinary:  Positive for difficulty urinating and flank pain. Negative for dysuria, hematuria and urgency. Musculoskeletal:  Positive for arthralgias, back pain, myalgias and neck pain. Negative for neck stiffness. Skin:  Negative for color change and rash. Neurological:  Positive for dizziness, syncope and light-headedness. Negative for tremors, weakness, numbness and headaches. Hematological:  Negative for adenopathy. Does not bruise/bleed easily. Psychiatric/Behavioral:  Negative for dysphoric mood and sleep disturbance. The patient is not nervous/anxious. Visit Vitals  /62   Pulse 72   Temp 97.1 °F (36.2 °C)   Ht 5' 7\" (1.702 m)   Wt 191 lb 12.8 oz (87 kg)   SpO2 96%   BMI 30.04 kg/m²       Physical Exam  Physical Exam  Vitals reviewed. Constitutional:       Appearance: Normal appearance. He is not ill-appearing or toxic-appearing. Eyes:      Pupils: Pupils are equal, round, and reactive to light. Cardiovascular:      Rate and Rhythm: Normal rate and regular rhythm. Heart sounds: No murmur heard. No gallop. Pulmonary:      Effort: Pulmonary effort is normal.      Breath sounds: Normal breath sounds. No wheezing or rhonchi. Abdominal:      General: There is no distension. Tenderness: There is no abdominal tenderness. There is no guarding. Musculoskeletal:         General: No swelling. Cervical back: Tenderness present. Skin:     General: Skin is warm and dry. Neurological:      Mental Status: He is alert. He is disoriented. ASSESSMENT & PLAN      I have reviewed the patient's past medical history, social history and family history and vitals. We have discussed treatment plan and follow up and given patient instructions. Patient's questions are answered and we will follow up as indicated.     Dylan Mackay was seen today for other.    Diagnoses and all orders for this visit:    Chronic kidney disease, unspecified CKD stage    CHAD (acute kidney injury) ([facility] [address]    At high risk for falls    Confusion    History of hypotension     -Due to patient's recent significant decline in kidney function and confusion, recommended emergent evaluation by ED.  -Dr. Aguilera consulted and agrees with plan of care. -Patient refused EMS, was able to contact roommate, Zhen Mix, who stated she was unable to obtain vehicle to drive patient to ED. Patient then changed his mind and agreed to EMS transport. Patient was transported to [facility] ED per patient's request.  Called and spoke to Dr. Harish Carbone at ED.            NIESHA Adams - NP yes

## 2023-03-10 ENCOUNTER — TELEPHONE (OUTPATIENT)
Dept: FAMILY MEDICINE CLINIC | Facility: CLINIC | Age: 78
End: 2023-03-10

## 2023-03-10 NOTE — TELEPHONE ENCOUNTER
Please call the patient back. He was inpatient at [facility] and discharged on 03/09/23. He needs to be scheduled for hospital follow-up. Ventricular Assist Device (VAD)  Interrogation Form    This patient’s Ventricular Assist Device  (VAD) has been interrogated  The following items have been evaluated (check all that apply)    []  Pump speed has been adjusted  []  Pump rate had been adjusted  []  Pump percent systole has been adjusted  []  Pump drive pressure has been adjusted  []  Pump vacuum has been adjusted  [x]  Alarm history has been reviewed  []  Data has been downloaded from the device and sent to the device company  []  VAD has been turned off  [] Other:     For Continuous Flow Devices: Please check:  []  Heartmate II   [x]  Heartmate 3   []   Heartware    []  Levitronix       Prior to adjustment After adjustment   Speed 5700 rpm; Low speed 5300 rpm    Power 4.3 w    PI 2.8    HCT 28%    Flow/Output     4.7 L/min      For Pulsatile Devices: Please Check: []  CardioWest ROB   []   Thoratec            Prior To Adjustment       After Adjustment   Rate     % Systole     Vacuum      Left Right Left  Right   Output       Fill Volumes       Drive Pressure         If someone other than the physician records these values, please enter name and date:       Name: Benito Zimmerman    Date: 8/16/2017    Time: 10:20    List any alarms and how situation was resolved: Patient had 88 PI events and one Low Flow alarm this morning. He had several small stacks of within the PI events. The low flow occurred at 08:58 when he was helped into a sitting position.     Physician’s interpretation of situation:

## 2023-03-10 NOTE — TELEPHONE ENCOUNTER
Care Transitions Initial Follow Up Call    Call within 2 business days of discharge: Yes     Patient: Sandhya Lopez Patient : 1945 MRN: [id number]    [unfilled]    RARS: No data recorded     Spoke with: left vmail     Discharge department/facility: [facility]    Non-face-to-face services provided:  [facility]    Follow Up  Future Appointments   Date Time Provider Ryan Merida   3/13/2023  3:00 PM Odin Pemberton MD [facility] AM[facility]   2023  9:00 AM Meet Villela MD [facility] AM[facility]   2023  8:45 AM [facility]   2023  9:30 AM Odin Pembertno MD [facility] AMB       Kingsley Prabhakar RN

## 2023-03-13 ENCOUNTER — OFFICE VISIT (OUTPATIENT)
Dept: FAMILY MEDICINE CLINIC | Facility: CLINIC | Age: 78
End: 2023-03-13

## 2023-03-13 VITALS
HEART RATE: 92 BPM | TEMPERATURE: 97.9 F | DIASTOLIC BLOOD PRESSURE: 70 MMHG | BODY MASS INDEX: 30.39 KG/M2 | HEIGHT: 67 IN | OXYGEN SATURATION: 99 % | SYSTOLIC BLOOD PRESSURE: 116 MMHG | WEIGHT: 193.6 LBS

## 2023-03-13 DIAGNOSIS — Z09 HOSPITAL DISCHARGE FOLLOW-UP: Primary | ICD-10-CM

## 2023-03-13 DIAGNOSIS — E51.9 THIAMINE DEFICIENCY: ICD-10-CM

## 2023-03-13 DIAGNOSIS — R13.10 DYSPHAGIA, UNSPECIFIED TYPE: ICD-10-CM

## 2023-03-13 DIAGNOSIS — E11.40 CONTROLLED TYPE 2 DIABETES MELLITUS WITH DIABETIC NEUROPATHY, WITHOUT LONG-TERM CURRENT USE OF INSULIN (HCC): ICD-10-CM

## 2023-03-13 DIAGNOSIS — N18.9 CHRONIC KIDNEY DISEASE, UNSPECIFIED CKD STAGE: ICD-10-CM

## 2023-03-13 DIAGNOSIS — E53.8 B12 DEFICIENCY: ICD-10-CM

## 2023-03-13 LAB
BASOPHILS # BLD: 0.1 K/UL (ref 0–0.2)
BASOPHILS NFR BLD: 1 % (ref 0–2)
DIFFERENTIAL METHOD BLD: ABNORMAL
EOSINOPHIL # BLD: 0.3 K/UL (ref 0–0.8)
EOSINOPHIL NFR BLD: 4 % (ref 0.5–7.8)
ERYTHROCYTE [DISTWIDTH] IN BLOOD BY AUTOMATED COUNT: 14.7 % (ref 11.9–14.6)
HCT VFR BLD AUTO: 39.6 % (ref 41.1–50.3)
HGB BLD-MCNC: 12.9 G/DL (ref 13.6–17.2)
IMM GRANULOCYTES # BLD AUTO: 0 K/UL (ref 0–0.5)
IMM GRANULOCYTES NFR BLD AUTO: 0 % (ref 0–5)
LYMPHOCYTES # BLD: 2.5 K/UL (ref 0.5–4.6)
LYMPHOCYTES NFR BLD: 30 % (ref 13–44)
MCH RBC QN AUTO: 29.7 PG (ref 26.1–32.9)
MCHC RBC AUTO-ENTMCNC: 32.6 G/DL (ref 31.4–35)
MCV RBC AUTO: 91 FL (ref 82–102)
MONOCYTES # BLD: 0.7 K/UL (ref 0.1–1.3)
MONOCYTES NFR BLD: 8 % (ref 4–12)
NEUTS SEG # BLD: 4.7 K/UL (ref 1.7–8.2)
NEUTS SEG NFR BLD: 57 % (ref 43–78)
NRBC # BLD: 0 K/UL (ref 0–0.2)
PLATELET # BLD AUTO: 175 K/UL (ref 150–450)
PMV BLD AUTO: 11.8 FL (ref 9.4–12.3)
RBC # BLD AUTO: 4.35 M/UL (ref 4.23–5.6)
WBC # BLD AUTO: 8.3 K/UL (ref 4.3–11.1)

## 2023-03-13 RX ORDER — LEVOTHYROXINE SODIUM 112 UG/1
112 TABLET ORAL DAILY
Qty: 90 TABLET | Refills: 3 | Status: SHIPPED | OUTPATIENT
Start: 2023-03-13

## 2023-03-13 RX ORDER — CYANOCOBALAMIN 1000 UG/ML
1000 INJECTION, SOLUTION INTRAMUSCULAR; SUBCUTANEOUS ONCE
Status: COMPLETED | OUTPATIENT
Start: 2023-03-13 | End: 2023-03-13

## 2023-03-13 RX ORDER — THIAMINE MONONITRATE (VIT B1) 100 MG
100 TABLET ORAL DAILY
Qty: 30 TABLET | Refills: 3 | Status: SHIPPED | OUTPATIENT
Start: 2023-03-13

## 2023-03-13 RX ORDER — ALLOPURINOL 300 MG/1
300 TABLET ORAL DAILY
COMMUNITY

## 2023-03-13 RX ORDER — PANTOPRAZOLE SODIUM 40 MG/1
40 TABLET, DELAYED RELEASE ORAL
Qty: 90 TABLET | Refills: 1 | Status: SHIPPED | OUTPATIENT
Start: 2023-03-13

## 2023-03-13 RX ADMIN — CYANOCOBALAMIN 1000 MCG: 1000 INJECTION, SOLUTION INTRAMUSCULAR; SUBCUTANEOUS at 15:51

## 2023-03-13 ASSESSMENT — ANXIETY QUESTIONNAIRES
4. TROUBLE RELAXING: 0
7. FEELING AFRAID AS IF SOMETHING AWFUL MIGHT HAPPEN: 0
2. NOT BEING ABLE TO STOP OR CONTROL WORRYING: 0
GAD7 TOTAL SCORE: 0
3. WORRYING TOO MUCH ABOUT DIFFERENT THINGS: 0
5. BEING SO RESTLESS THAT IT IS HARD TO SIT STILL: 0
6. BECOMING EASILY ANNOYED OR IRRITABLE: 0
1. FEELING NERVOUS, ANXIOUS, OR ON EDGE: 0

## 2023-03-13 ASSESSMENT — PATIENT HEALTH QUESTIONNAIRE - PHQ9
SUM OF ALL RESPONSES TO PHQ QUESTIONS 1-9: 0
SUM OF ALL RESPONSES TO PHQ9 QUESTIONS 1 & 2: 0
SUM OF ALL RESPONSES TO PHQ QUESTIONS 1-9: 0
SUM OF ALL RESPONSES TO PHQ QUESTIONS 1-9: 0
1. LITTLE INTEREST OR PLEASURE IN DOING THINGS: 0
2. FEELING DOWN, DEPRESSED OR HOPELESS: 0
SUM OF ALL RESPONSES TO PHQ QUESTIONS 1-9: 0

## 2023-03-13 NOTE — PROGRESS NOTES
Rectal varices    Grieving    Dysuria    Insomnia    Chronic kidney disease    Elevated PSA    Malignant neoplasm of prostate ([facility] [address]    Unstable angina ([facility] [address]       Medications listed as ordered at the time of discharge from hospital     Medication List            Accurate as of March 13, 2023 11:59 PM. If you have any questions, ask your nurse or doctor.                 START taking these medications      pantoprazole 40 MG tablet  Commonly known as: PROTONIX  Take 1 tablet by mouth every morning (before breakfast)  Started by: Brian Devlin MD     vitamin B-1 100 MG tablet  Commonly known as: THIAMINE  Take 1 tablet by mouth daily  Started by: Brian Devlin MD            CONTINUE taking these medications      allopurinol 300 MG tablet  Commonly known as: ZYLOPRIM     aspirin 81 MG EC tablet     levothyroxine 112 MCG tablet  Commonly known as: SYNTHROID  Take 1 tablet by mouth daily     methocarbamol 500 MG tablet  Commonly known as: ROBAXIN  Take 1 tablet by mouth at bedtime     rosuvastatin 20 MG tablet  Commonly known as: CRESTOR  Take 1 tablet by mouth nightly     tamsulosin 0.4 MG capsule  Commonly known as: FLOMAX  Take 1 capsule by mouth daily               Where to Get Your Medications        These medications were sent to [address] [place] [address] Bayron Gray, [place] [id number]      Phone: [phone]   levothyroxine 112 MCG tablet  pantoprazole 40 MG tablet  vitamin B-1 100 MG tablet           Medications marked \"taking\" at this time  Outpatient Medications Marked as Taking for the 3/13/23 encounter (Office Visit) with Brian Devlin MD   Medication Sig Dispense Refill    allopurinol (ZYLOPRIM) 300 MG tablet Take 300 mg by mouth daily      levothyroxine (SYNTHROID) 112 MCG tablet Take 1 tablet by mouth daily 90 tablet 3    pantoprazole (PROTONIX) 40 MG tablet Take 1 tablet by mouth every morning (before breakfast) 90

## 2023-03-14 ENCOUNTER — TELEPHONE (OUTPATIENT)
Dept: FAMILY MEDICINE CLINIC | Facility: CLINIC | Age: 78
End: 2023-03-14

## 2023-03-14 LAB
ANION GAP SERPL CALC-SCNC: 7 MMOL/L (ref 2–11)
BUN SERPL-MCNC: 16 MG/DL (ref 8–23)
CALCIUM SERPL-MCNC: 9.2 MG/DL (ref 8.3–10.4)
CHLORIDE SERPL-SCNC: 113 MMOL/L (ref 101–110)
CO2 SERPL-SCNC: 22 MMOL/L (ref 21–32)
CREAT SERPL-MCNC: 1.2 MG/DL (ref 0.8–1.5)
GLUCOSE SERPL-MCNC: 103 MG/DL (ref 65–100)
MAGNESIUM SERPL-MCNC: 1.2 MG/DL (ref 1.8–2.4)
POTASSIUM SERPL-SCNC: 4.7 MMOL/L (ref 3.5–5.1)
SODIUM SERPL-SCNC: 142 MMOL/L (ref 133–143)

## 2023-03-14 NOTE — TELEPHONE ENCOUNTER
Called , Ms Leti Bobo was at pharmacy ,he did not know her number nor why she called. Her number not listed in chart.

## 2023-03-14 NOTE — TELEPHONE ENCOUNTER
Please call Mrs Louis Donnell has ?  About what vitamin for him she it at the [facility]

## 2023-03-15 ENCOUNTER — TELEPHONE (OUTPATIENT)
Dept: FAMILY MEDICINE CLINIC | Facility: CLINIC | Age: 78
End: 2023-03-15

## 2023-03-15 LAB — IF BLOCK AB SER-ACNC: 14.9 AU/ML (ref 0–1.1)

## 2023-03-17 ENCOUNTER — TELEPHONE (OUTPATIENT)
Dept: FAMILY MEDICINE CLINIC | Facility: CLINIC | Age: 78
End: 2023-03-17

## 2023-03-17 NOTE — TELEPHONE ENCOUNTER
Call back from [facility] they state that cardio has cleared pt for surgery ,however they need a medical clearance from pcp as in regards to his blood pressure.  Fax [phone].

## 2023-03-17 NOTE — LETTER
March 17, 2023       [address] [place] [address]      To whom it may concern,    Edna Gonzalez was seen in office on 3- and blood pressure was 116/70. Pt is cleared for surgery. If you have any questions or concerns, please don't hesitate to call.     Sincerely,        Tae Singleton MD

## 2023-03-20 ENCOUNTER — NURSE ONLY (OUTPATIENT)
Dept: FAMILY MEDICINE CLINIC | Facility: CLINIC | Age: 78
End: 2023-03-20
Payer: MEDICARE

## 2023-03-20 DIAGNOSIS — E53.8 B12 DEFICIENCY: Primary | ICD-10-CM

## 2023-03-20 PROCEDURE — 96372 THER/PROPH/DIAG INJ SC/IM: CPT | Performed by: STUDENT IN AN ORGANIZED HEALTH CARE EDUCATION/TRAINING PROGRAM

## 2023-03-20 RX ORDER — CYANOCOBALAMIN 1000 UG/ML
1000 INJECTION, SOLUTION INTRAMUSCULAR; SUBCUTANEOUS ONCE
Status: COMPLETED | OUTPATIENT
Start: 2023-03-20 | End: 2023-03-20

## 2023-03-20 RX ADMIN — CYANOCOBALAMIN 1000 MCG: 1000 INJECTION, SOLUTION INTRAMUSCULAR; SUBCUTANEOUS at 13:24

## 2023-03-27 ENCOUNTER — NURSE ONLY (OUTPATIENT)
Dept: FAMILY MEDICINE CLINIC | Facility: CLINIC | Age: 78
End: 2023-03-27
Payer: MEDICARE

## 2023-03-27 DIAGNOSIS — E53.8 B12 DEFICIENCY: Primary | ICD-10-CM

## 2023-03-27 PROCEDURE — 96372 THER/PROPH/DIAG INJ SC/IM: CPT | Performed by: STUDENT IN AN ORGANIZED HEALTH CARE EDUCATION/TRAINING PROGRAM

## 2023-03-27 RX ORDER — CYANOCOBALAMIN 1000 UG/ML
1000 INJECTION, SOLUTION INTRAMUSCULAR; SUBCUTANEOUS ONCE
Status: COMPLETED | OUTPATIENT
Start: 2023-03-27 | End: 2023-03-27

## 2023-03-27 RX ADMIN — CYANOCOBALAMIN 1000 MCG: 1000 INJECTION, SOLUTION INTRAMUSCULAR; SUBCUTANEOUS at 15:13

## 2023-04-03 ENCOUNTER — NURSE ONLY (OUTPATIENT)
Dept: FAMILY MEDICINE CLINIC | Facility: CLINIC | Age: 78
End: 2023-04-03
Payer: MEDICARE

## 2023-04-03 DIAGNOSIS — E53.8 B12 DEFICIENCY: Primary | ICD-10-CM

## 2023-04-03 PROCEDURE — 96372 THER/PROPH/DIAG INJ SC/IM: CPT | Performed by: STUDENT IN AN ORGANIZED HEALTH CARE EDUCATION/TRAINING PROGRAM

## 2023-04-03 RX ORDER — CYANOCOBALAMIN 1000 UG/ML
1000 INJECTION, SOLUTION INTRAMUSCULAR; SUBCUTANEOUS ONCE
Status: COMPLETED | OUTPATIENT
Start: 2023-04-03 | End: 2023-04-03

## 2023-04-03 RX ADMIN — CYANOCOBALAMIN 1000 MCG: 1000 INJECTION, SOLUTION INTRAMUSCULAR; SUBCUTANEOUS at 15:25

## 2023-04-25 ENCOUNTER — OFFICE VISIT (OUTPATIENT)
Dept: FAMILY MEDICINE CLINIC | Facility: CLINIC | Age: 78
End: 2023-04-25
Payer: MEDICARE

## 2023-04-25 VITALS
SYSTOLIC BLOOD PRESSURE: 158 MMHG | HEART RATE: 86 BPM | TEMPERATURE: 98 F | DIASTOLIC BLOOD PRESSURE: 80 MMHG | BODY MASS INDEX: 31.23 KG/M2 | HEIGHT: 67 IN | OXYGEN SATURATION: 98 % | WEIGHT: 199 LBS

## 2023-04-25 DIAGNOSIS — E11.40 CONTROLLED TYPE 2 DIABETES MELLITUS WITH DIABETIC NEUROPATHY, WITHOUT LONG-TERM CURRENT USE OF INSULIN (HCC): ICD-10-CM

## 2023-04-25 DIAGNOSIS — E89.0 POSTSURGICAL HYPOTHYROIDISM: ICD-10-CM

## 2023-04-25 DIAGNOSIS — I10 ESSENTIAL HYPERTENSION: ICD-10-CM

## 2023-04-25 DIAGNOSIS — R59.0 SUBMANDIBULAR LYMPHADENOPATHY: ICD-10-CM

## 2023-04-25 DIAGNOSIS — E83.42 HYPOMAGNESEMIA: ICD-10-CM

## 2023-04-25 DIAGNOSIS — E53.8 B12 DEFICIENCY: Primary | ICD-10-CM

## 2023-04-25 DIAGNOSIS — N18.9 CHRONIC KIDNEY DISEASE, UNSPECIFIED CKD STAGE: ICD-10-CM

## 2023-04-25 DIAGNOSIS — E51.9 THIAMINE DEFICIENCY: ICD-10-CM

## 2023-04-25 PROCEDURE — 99214 OFFICE O/P EST MOD 30 MIN: CPT | Performed by: STUDENT IN AN ORGANIZED HEALTH CARE EDUCATION/TRAINING PROGRAM

## 2023-04-25 PROCEDURE — 3078F DIAST BP <80 MM HG: CPT | Performed by: STUDENT IN AN ORGANIZED HEALTH CARE EDUCATION/TRAINING PROGRAM

## 2023-04-25 PROCEDURE — 3074F SYST BP LT 130 MM HG: CPT | Performed by: STUDENT IN AN ORGANIZED HEALTH CARE EDUCATION/TRAINING PROGRAM

## 2023-04-25 PROCEDURE — 3044F HG A1C LEVEL LT 7.0%: CPT | Performed by: STUDENT IN AN ORGANIZED HEALTH CARE EDUCATION/TRAINING PROGRAM

## 2023-04-25 PROCEDURE — 1123F ACP DISCUSS/DSCN MKR DOCD: CPT | Performed by: STUDENT IN AN ORGANIZED HEALTH CARE EDUCATION/TRAINING PROGRAM

## 2023-04-25 RX ORDER — EPLERENONE 50 MG/1
50 TABLET, FILM COATED ORAL DAILY
Qty: 30 TABLET | Refills: 5 | Status: SHIPPED | OUTPATIENT
Start: 2023-04-25

## 2023-04-25 RX ORDER — LANOLIN ALCOHOL/MO/W.PET/CERES
400 CREAM (GRAM) TOPICAL DAILY
Qty: 30 TABLET | Refills: 3 | Status: SHIPPED | OUTPATIENT
Start: 2023-04-25

## 2023-04-25 ASSESSMENT — PATIENT HEALTH QUESTIONNAIRE - PHQ9
SUM OF ALL RESPONSES TO PHQ QUESTIONS 1-9: 0
2. FEELING DOWN, DEPRESSED OR HOPELESS: 0
SUM OF ALL RESPONSES TO PHQ9 QUESTIONS 1 & 2: 0
SUM OF ALL RESPONSES TO PHQ QUESTIONS 1-9: 0
1. LITTLE INTEREST OR PLEASURE IN DOING THINGS: 0

## 2023-04-28 DIAGNOSIS — N18.9 CHRONIC KIDNEY DISEASE, UNSPECIFIED CKD STAGE: ICD-10-CM

## 2023-04-28 DIAGNOSIS — E78.2 MIXED HYPERLIPIDEMIA: ICD-10-CM

## 2023-04-28 DIAGNOSIS — E11.40 CONTROLLED TYPE 2 DIABETES MELLITUS WITH DIABETIC NEUROPATHY, WITHOUT LONG-TERM CURRENT USE OF INSULIN (HCC): ICD-10-CM

## 2023-04-28 DIAGNOSIS — E53.8 B12 DEFICIENCY: Primary | ICD-10-CM

## 2023-04-28 DIAGNOSIS — E89.0 POSTSURGICAL HYPOTHYROIDISM: ICD-10-CM

## 2023-04-28 DIAGNOSIS — I10 ESSENTIAL HYPERTENSION: ICD-10-CM

## 2023-05-01 ENCOUNTER — NURSE ONLY (OUTPATIENT)
Dept: FAMILY MEDICINE CLINIC | Facility: CLINIC | Age: 78
End: 2023-05-01

## 2023-05-01 ENCOUNTER — NURSE ONLY (OUTPATIENT)
Dept: FAMILY MEDICINE CLINIC | Facility: CLINIC | Age: 78
End: 2023-05-01
Payer: MEDICARE

## 2023-05-01 DIAGNOSIS — I10 ESSENTIAL HYPERTENSION: ICD-10-CM

## 2023-05-01 DIAGNOSIS — N18.9 CHRONIC KIDNEY DISEASE, UNSPECIFIED CKD STAGE: ICD-10-CM

## 2023-05-01 DIAGNOSIS — E53.8 B12 DEFICIENCY: ICD-10-CM

## 2023-05-01 DIAGNOSIS — E78.2 MIXED HYPERLIPIDEMIA: ICD-10-CM

## 2023-05-01 DIAGNOSIS — E89.0 POSTSURGICAL HYPOTHYROIDISM: ICD-10-CM

## 2023-05-01 DIAGNOSIS — E11.40 CONTROLLED TYPE 2 DIABETES MELLITUS WITH DIABETIC NEUROPATHY, WITHOUT LONG-TERM CURRENT USE OF INSULIN (HCC): ICD-10-CM

## 2023-05-01 DIAGNOSIS — E53.8 B12 DEFICIENCY: Primary | ICD-10-CM

## 2023-05-01 LAB
BASOPHILS # BLD: 0.1 K/UL (ref 0–0.2)
BASOPHILS NFR BLD: 1 % (ref 0–2)
DIFFERENTIAL METHOD BLD: ABNORMAL
EOSINOPHIL # BLD: 0.3 K/UL (ref 0–0.8)
EOSINOPHIL NFR BLD: 4 % (ref 0.5–7.8)
ERYTHROCYTE [DISTWIDTH] IN BLOOD BY AUTOMATED COUNT: 14.4 % (ref 11.9–14.6)
EST. AVERAGE GLUCOSE BLD GHB EST-MCNC: 94 MG/DL
HBA1C MFR BLD: 4.9 % (ref 4.8–5.6)
HCT VFR BLD AUTO: 39.1 % (ref 41.1–50.3)
HGB BLD-MCNC: 12.2 G/DL (ref 13.6–17.2)
IMM GRANULOCYTES # BLD AUTO: 0 K/UL (ref 0–0.5)
IMM GRANULOCYTES NFR BLD AUTO: 0 % (ref 0–5)
LYMPHOCYTES # BLD: 1.9 K/UL (ref 0.5–4.6)
LYMPHOCYTES NFR BLD: 27 % (ref 13–44)
MCH RBC QN AUTO: 29.9 PG (ref 26.1–32.9)
MCHC RBC AUTO-ENTMCNC: 31.2 G/DL (ref 31.4–35)
MCV RBC AUTO: 95.8 FL (ref 82–102)
MONOCYTES # BLD: 0.7 K/UL (ref 0.1–1.3)
MONOCYTES NFR BLD: 9 % (ref 4–12)
NEUTS SEG # BLD: 4.1 K/UL (ref 1.7–8.2)
NEUTS SEG NFR BLD: 59 % (ref 43–78)
NRBC # BLD: 0 K/UL (ref 0–0.2)
PLATELET # BLD AUTO: 175 K/UL (ref 150–450)
PMV BLD AUTO: 11.1 FL (ref 9.4–12.3)
RBC # BLD AUTO: 4.08 M/UL (ref 4.23–5.6)
WBC # BLD AUTO: 7.1 K/UL (ref 4.3–11.1)

## 2023-05-01 PROCEDURE — 96372 THER/PROPH/DIAG INJ SC/IM: CPT | Performed by: STUDENT IN AN ORGANIZED HEALTH CARE EDUCATION/TRAINING PROGRAM

## 2023-05-01 RX ORDER — CYANOCOBALAMIN 1000 UG/ML
1000 INJECTION, SOLUTION INTRAMUSCULAR; SUBCUTANEOUS ONCE
Status: COMPLETED | OUTPATIENT
Start: 2023-05-01 | End: 2023-05-01

## 2023-05-01 RX ADMIN — CYANOCOBALAMIN 1000 MCG: 1000 INJECTION, SOLUTION INTRAMUSCULAR; SUBCUTANEOUS at 10:07

## 2023-05-02 LAB
ALBUMIN SERPL-MCNC: 3.5 G/DL (ref 3.2–4.6)
ALBUMIN/GLOB SERPL: 1.3 (ref 0.4–1.6)
ALP SERPL-CCNC: 56 U/L (ref 50–136)
ALT SERPL-CCNC: 15 U/L (ref 12–65)
ANION GAP SERPL CALC-SCNC: 2 MMOL/L (ref 2–11)
AST SERPL-CCNC: 20 U/L (ref 15–37)
BILIRUB SERPL-MCNC: 0.8 MG/DL (ref 0.2–1.1)
BUN SERPL-MCNC: 12 MG/DL (ref 8–23)
CALCIUM SERPL-MCNC: 8.7 MG/DL (ref 8.3–10.4)
CHLORIDE SERPL-SCNC: 109 MMOL/L (ref 101–110)
CHOLEST SERPL-MCNC: 129 MG/DL
CO2 SERPL-SCNC: 29 MMOL/L (ref 21–32)
CREAT SERPL-MCNC: 1 MG/DL (ref 0.8–1.5)
GLOBULIN SER CALC-MCNC: 2.8 G/DL (ref 2.8–4.5)
GLUCOSE SERPL-MCNC: 93 MG/DL (ref 65–100)
HDLC SERPL-MCNC: 43 MG/DL (ref 40–60)
HDLC SERPL: 3
LDLC SERPL CALC-MCNC: 61.2 MG/DL
POTASSIUM SERPL-SCNC: 3.9 MMOL/L (ref 3.5–5.1)
PROT SERPL-MCNC: 6.3 G/DL (ref 6.3–8.2)
SODIUM SERPL-SCNC: 140 MMOL/L (ref 133–143)
TRIGL SERPL-MCNC: 124 MG/DL (ref 35–150)
TSH, 3RD GENERATION: 1.34 UIU/ML (ref 0.36–3.74)
VIT B12 SERPL-MCNC: 326 PG/ML (ref 193–986)
VLDLC SERPL CALC-MCNC: 24.8 MG/DL (ref 6–23)

## 2023-05-25 RX ORDER — ASPIRIN 81 MG/1
81 TABLET ORAL DAILY
Qty: 90 TABLET | Refills: 1 | Status: SHIPPED | OUTPATIENT
Start: 2023-05-25

## 2023-05-25 RX ORDER — THIAMINE MONONITRATE (VIT B1) 100 MG
100 TABLET ORAL DAILY
Qty: 90 TABLET | Refills: 1 | Status: SHIPPED | OUTPATIENT
Start: 2023-05-25

## 2023-05-25 RX ORDER — TAMSULOSIN HYDROCHLORIDE 0.4 MG/1
0.4 CAPSULE ORAL DAILY
Qty: 90 CAPSULE | Refills: 1 | Status: SHIPPED | OUTPATIENT
Start: 2023-05-25

## 2023-05-25 RX ORDER — LANOLIN ALCOHOL/MO/W.PET/CERES
400 CREAM (GRAM) TOPICAL DAILY
Qty: 90 TABLET | Refills: 1 | Status: SHIPPED | OUTPATIENT
Start: 2023-05-25

## 2023-05-25 RX ORDER — PANTOPRAZOLE SODIUM 40 MG/1
40 TABLET, DELAYED RELEASE ORAL
Qty: 90 TABLET | Refills: 1 | Status: SHIPPED | OUTPATIENT
Start: 2023-05-25

## 2023-06-20 ENCOUNTER — TELEPHONE (OUTPATIENT)
Dept: FAMILY MEDICINE CLINIC | Facility: CLINIC | Age: 78
End: 2023-06-20

## 2023-06-30 ENCOUNTER — HOSPITAL ENCOUNTER (OUTPATIENT)
Dept: ULTRASOUND IMAGING | Age: 78
Discharge: HOME | End: 2023-06-30
Attending: STUDENT IN AN ORGANIZED HEALTH CARE EDUCATION/TRAINING PROGRAM
Payer: MEDICARE

## 2023-06-30 DIAGNOSIS — R59.0 SUBMANDIBULAR LYMPHADENOPATHY: ICD-10-CM

## 2023-06-30 PROCEDURE — 76536 US EXAM OF HEAD AND NECK: CPT

## 2023-09-07 ENCOUNTER — NURSE ONLY (OUTPATIENT)
Dept: FAMILY MEDICINE CLINIC | Facility: CLINIC | Age: 78
End: 2023-09-07

## 2023-09-07 DIAGNOSIS — E53.8 B12 DEFICIENCY: ICD-10-CM

## 2023-09-07 DIAGNOSIS — E51.9 THIAMINE DEFICIENCY: ICD-10-CM

## 2023-09-07 DIAGNOSIS — E11.40 CONTROLLED TYPE 2 DIABETES MELLITUS WITH DIABETIC NEUROPATHY, WITHOUT LONG-TERM CURRENT USE OF INSULIN (HCC): ICD-10-CM

## 2023-09-07 DIAGNOSIS — E83.42 HYPOMAGNESEMIA: ICD-10-CM

## 2023-09-07 DIAGNOSIS — I10 ESSENTIAL HYPERTENSION: ICD-10-CM

## 2023-09-07 DIAGNOSIS — E89.0 POSTSURGICAL HYPOTHYROIDISM: ICD-10-CM

## 2023-09-07 LAB
ALBUMIN SERPL-MCNC: 3.8 G/DL (ref 3.2–4.6)
ALBUMIN/GLOB SERPL: 1.3 (ref 0.4–1.6)
ALP SERPL-CCNC: 68 U/L (ref 50–136)
ALT SERPL-CCNC: 16 U/L (ref 12–65)
ANION GAP SERPL CALC-SCNC: 5 MMOL/L (ref 2–11)
AST SERPL-CCNC: 18 U/L (ref 15–37)
BASOPHILS # BLD: 0.1 K/UL (ref 0–0.2)
BASOPHILS NFR BLD: 1 % (ref 0–2)
BILIRUB SERPL-MCNC: 0.6 MG/DL (ref 0.2–1.1)
BUN SERPL-MCNC: 16 MG/DL (ref 8–23)
CALCIUM SERPL-MCNC: 8.9 MG/DL (ref 8.3–10.4)
CHLORIDE SERPL-SCNC: 111 MMOL/L (ref 101–110)
CHOLEST SERPL-MCNC: 121 MG/DL
CO2 SERPL-SCNC: 28 MMOL/L (ref 21–32)
CREAT SERPL-MCNC: 1.3 MG/DL (ref 0.8–1.5)
DIFFERENTIAL METHOD BLD: ABNORMAL
EOSINOPHIL # BLD: 0.3 K/UL (ref 0–0.8)
EOSINOPHIL NFR BLD: 4 % (ref 0.5–7.8)
ERYTHROCYTE [DISTWIDTH] IN BLOOD BY AUTOMATED COUNT: 16.1 % (ref 11.9–14.6)
GLOBULIN SER CALC-MCNC: 3 G/DL (ref 2.8–4.5)
GLUCOSE SERPL-MCNC: 117 MG/DL (ref 65–100)
HCT VFR BLD AUTO: 41.7 % (ref 41.1–50.3)
HDLC SERPL-MCNC: 52 MG/DL (ref 40–60)
HDLC SERPL: 2.3
HGB BLD-MCNC: 13.1 G/DL (ref 13.6–17.2)
IMM GRANULOCYTES # BLD AUTO: 0 K/UL (ref 0–0.5)
IMM GRANULOCYTES NFR BLD AUTO: 0 % (ref 0–5)
LDLC SERPL CALC-MCNC: 44.6 MG/DL
LYMPHOCYTES # BLD: 1.7 K/UL (ref 0.5–4.6)
LYMPHOCYTES NFR BLD: 23 % (ref 13–44)
MAGNESIUM SERPL-MCNC: 1.7 MG/DL (ref 1.8–2.4)
MCH RBC QN AUTO: 28.7 PG (ref 26.1–32.9)
MCHC RBC AUTO-ENTMCNC: 31.4 G/DL (ref 31.4–35)
MCV RBC AUTO: 91.2 FL (ref 82–102)
MONOCYTES # BLD: 0.6 K/UL (ref 0.1–1.3)
MONOCYTES NFR BLD: 7 % (ref 4–12)
NEUTS SEG # BLD: 4.8 K/UL (ref 1.7–8.2)
NEUTS SEG NFR BLD: 65 % (ref 43–78)
NRBC # BLD: 0 K/UL (ref 0–0.2)
PLATELET # BLD AUTO: 187 K/UL (ref 150–450)
PMV BLD AUTO: 11.7 FL (ref 9.4–12.3)
POTASSIUM SERPL-SCNC: 4.3 MMOL/L (ref 3.5–5.1)
PROT SERPL-MCNC: 6.8 G/DL (ref 6.3–8.2)
RBC # BLD AUTO: 4.57 M/UL (ref 4.23–5.6)
SODIUM SERPL-SCNC: 144 MMOL/L (ref 133–143)
TRIGL SERPL-MCNC: 122 MG/DL (ref 35–150)
TSH W FREE THYROID IF ABNORMAL: 3.13 UIU/ML (ref 0.36–3.74)
VIT B12 SERPL-MCNC: 264 PG/ML (ref 193–986)
VLDLC SERPL CALC-MCNC: 24.4 MG/DL (ref 6–23)
WBC # BLD AUTO: 7.4 K/UL (ref 4.3–11.1)

## 2023-09-08 LAB
EST. AVERAGE GLUCOSE BLD GHB EST-MCNC: 128 MG/DL
HBA1C MFR BLD: 6.1 % (ref 4.8–5.6)

## 2023-09-12 LAB — VIT B1 BLD-SCNC: 209.9 NMOL/L (ref 66.5–200)

## 2023-09-13 ENCOUNTER — OFFICE VISIT (OUTPATIENT)
Dept: FAMILY MEDICINE CLINIC | Facility: CLINIC | Age: 78
End: 2023-09-13
Payer: MEDICARE

## 2023-09-13 VITALS
HEIGHT: 67 IN | TEMPERATURE: 97 F | HEART RATE: 82 BPM | SYSTOLIC BLOOD PRESSURE: 138 MMHG | WEIGHT: 203 LBS | OXYGEN SATURATION: 97 % | DIASTOLIC BLOOD PRESSURE: 80 MMHG | BODY MASS INDEX: 31.86 KG/M2

## 2023-09-13 DIAGNOSIS — M79.605 BILATERAL LEG PAIN: ICD-10-CM

## 2023-09-13 DIAGNOSIS — E89.0 POSTSURGICAL HYPOTHYROIDISM: ICD-10-CM

## 2023-09-13 DIAGNOSIS — D51.0 PERNICIOUS ANEMIA: ICD-10-CM

## 2023-09-13 DIAGNOSIS — E53.8 B12 DEFICIENCY: ICD-10-CM

## 2023-09-13 DIAGNOSIS — M79.604 BILATERAL LEG PAIN: ICD-10-CM

## 2023-09-13 DIAGNOSIS — G89.29 CHRONIC PAIN OF RIGHT ANKLE: Primary | ICD-10-CM

## 2023-09-13 DIAGNOSIS — M25.571 CHRONIC PAIN OF RIGHT ANKLE: Primary | ICD-10-CM

## 2023-09-13 PROCEDURE — 99214 OFFICE O/P EST MOD 30 MIN: CPT | Performed by: STUDENT IN AN ORGANIZED HEALTH CARE EDUCATION/TRAINING PROGRAM

## 2023-09-13 PROCEDURE — 3078F DIAST BP <80 MM HG: CPT | Performed by: STUDENT IN AN ORGANIZED HEALTH CARE EDUCATION/TRAINING PROGRAM

## 2023-09-13 PROCEDURE — 96372 THER/PROPH/DIAG INJ SC/IM: CPT | Performed by: STUDENT IN AN ORGANIZED HEALTH CARE EDUCATION/TRAINING PROGRAM

## 2023-09-13 PROCEDURE — 1123F ACP DISCUSS/DSCN MKR DOCD: CPT | Performed by: STUDENT IN AN ORGANIZED HEALTH CARE EDUCATION/TRAINING PROGRAM

## 2023-09-13 PROCEDURE — 3074F SYST BP LT 130 MM HG: CPT | Performed by: STUDENT IN AN ORGANIZED HEALTH CARE EDUCATION/TRAINING PROGRAM

## 2023-09-13 RX ORDER — LEVOTHYROXINE SODIUM 112 UG/1
112 TABLET ORAL DAILY
Qty: 90 TABLET | Refills: 3 | Status: SHIPPED | OUTPATIENT
Start: 2023-09-13

## 2023-09-13 RX ORDER — EPLERENONE 50 MG/1
50 TABLET, FILM COATED ORAL DAILY
Qty: 90 TABLET | Refills: 3 | Status: SHIPPED | OUTPATIENT
Start: 2023-09-13

## 2023-09-13 RX ORDER — THIAMINE MONONITRATE (VIT B1) 100 MG
100 TABLET ORAL DAILY
Qty: 90 TABLET | Refills: 3 | Status: CANCELLED | OUTPATIENT
Start: 2023-09-13

## 2023-09-13 RX ORDER — METHOCARBAMOL 500 MG/1
500 TABLET, FILM COATED ORAL NIGHTLY
Qty: 90 TABLET | Refills: 3 | Status: SHIPPED | OUTPATIENT
Start: 2023-09-13

## 2023-09-13 RX ORDER — PANTOPRAZOLE SODIUM 40 MG/1
40 TABLET, DELAYED RELEASE ORAL
Qty: 90 TABLET | Refills: 3 | Status: SHIPPED | OUTPATIENT
Start: 2023-09-13

## 2023-09-13 RX ORDER — ASPIRIN 81 MG/1
81 TABLET ORAL DAILY
Qty: 90 TABLET | Refills: 3 | Status: SHIPPED | OUTPATIENT
Start: 2023-09-13

## 2023-09-13 RX ORDER — LANOLIN ALCOHOL/MO/W.PET/CERES
400 CREAM (GRAM) TOPICAL DAILY
Qty: 90 TABLET | Refills: 3 | Status: SHIPPED | OUTPATIENT
Start: 2023-09-13

## 2023-09-13 RX ORDER — TAMSULOSIN HYDROCHLORIDE 0.4 MG/1
0.4 CAPSULE ORAL DAILY
Qty: 90 CAPSULE | Refills: 3 | Status: SHIPPED | OUTPATIENT
Start: 2023-09-13

## 2023-09-13 RX ORDER — CYANOCOBALAMIN 1000 UG/ML
1000 INJECTION, SOLUTION INTRAMUSCULAR; SUBCUTANEOUS ONCE
Status: COMPLETED | OUTPATIENT
Start: 2023-09-13 | End: 2023-09-13

## 2023-09-13 RX ORDER — ROSUVASTATIN CALCIUM 20 MG/1
20 TABLET, COATED ORAL NIGHTLY
Qty: 90 TABLET | Refills: 3 | Status: SHIPPED | OUTPATIENT
Start: 2023-09-13

## 2023-09-13 RX ADMIN — CYANOCOBALAMIN 1000 MCG: 1000 INJECTION, SOLUTION INTRAMUSCULAR; SUBCUTANEOUS at 09:44

## 2023-09-13 NOTE — PROGRESS NOTES
[facility]  [address] [facility], [address]  Phone [phone]  Fax:  [phone]    Sana Forde (:  1945) is a 78 y.o. male here for evaluation of the following chief complaint(s):  Hypothyroidism, Gastroesophageal Reflux, and Benign Prostatic Hypertrophy (Refills WillyOuner labs )       ASSESSMENT/PLAN:  1. Chronic pain of right ankle  -     XR ANKLE RIGHT (MIN 3 VIEWS); Future  2. Bilateral leg pain  -     Ambulatory Referral to Cardiac Testing  - [facility] Cardiology  [facility] - Physical Therapy, [address] [place] [address] Internal Clinics  3. B12 deficiency  -     cyanocobalamin injection 1,000 mcg; 1,000 mcg, IntraMUSCular, ONCE, 1 dose, On 23 at 1000  4. Pernicious anemia  5. Postsurgical hypothyroidism    Recent vitamin B-1 level mildly elevated. Can stop thiamine supplementation. TSH normal, continue Synthroid 112 mcg daily for hypothyroidism. Hemoglobin A1c 6.1, diabetes well controlled with diet. CMP unremarkable. CBC shows mildly low hemoglobin at 13.1 but improved. Vitamin B12 level low normal.  Patient has pernicious anemia, was supposed to be getting monthly B12 injections but last injection was about 3 months ago. B12 injection given today. Pain in his hamstrings and calves over the past few months. Also reports numbness/tingling both lower legs. Will check ABIs to rule out PAD. Also refer to physical therapy. Reports lateral right ankle pain intermittently for months without injury, check x-ray of right ankle. Neck ultrasound 2023 showed asymmetric heterogenous echotexture of the right submandibular gland without significant enlargement, no other significant abnormalities noted. Patient reports the submandibular lump has gotten smaller in size. Followed by urology for history of prostate cancer s/p prostatectomy, PSA 2023 was 0.014. Colonoscopy 2022 showed internal hemorrhoids, rectal varices, diverticulosis, and tubulovillous adenoma.

## 2023-10-25 ENCOUNTER — OFFICE VISIT (OUTPATIENT)
Dept: FAMILY MEDICINE CLINIC | Facility: CLINIC | Age: 78
End: 2023-10-25
Payer: MEDICARE

## 2023-10-25 VITALS
WEIGHT: 201 LBS | SYSTOLIC BLOOD PRESSURE: 122 MMHG | OXYGEN SATURATION: 98 % | DIASTOLIC BLOOD PRESSURE: 82 MMHG | HEIGHT: 67 IN | BODY MASS INDEX: 31.55 KG/M2 | HEART RATE: 88 BPM | TEMPERATURE: 98 F

## 2023-10-25 DIAGNOSIS — M25.551 BILATERAL HIP PAIN: ICD-10-CM

## 2023-10-25 DIAGNOSIS — H81.12 BENIGN PAROXYSMAL POSITIONAL VERTIGO OF LEFT EAR: ICD-10-CM

## 2023-10-25 DIAGNOSIS — E11.40 CONTROLLED TYPE 2 DIABETES MELLITUS WITH DIABETIC NEUROPATHY, WITHOUT LONG-TERM CURRENT USE OF INSULIN (HCC): Primary | ICD-10-CM

## 2023-10-25 DIAGNOSIS — D51.0 PERNICIOUS ANEMIA: ICD-10-CM

## 2023-10-25 DIAGNOSIS — M25.552 BILATERAL HIP PAIN: ICD-10-CM

## 2023-10-25 DIAGNOSIS — R06.02 SOB (SHORTNESS OF BREATH): ICD-10-CM

## 2023-10-25 DIAGNOSIS — M79.604 RIGHT LEG PAIN: ICD-10-CM

## 2023-10-25 LAB
ANION GAP SERPL CALC-SCNC: 4 MMOL/L (ref 2–11)
BASOPHILS # BLD: 0.1 K/UL (ref 0–0.2)
BASOPHILS NFR BLD: 2 % (ref 0–2)
BUN SERPL-MCNC: 19 MG/DL (ref 8–23)
CALCIUM SERPL-MCNC: 9.3 MG/DL (ref 8.3–10.4)
CHLORIDE SERPL-SCNC: 107 MMOL/L (ref 101–110)
CO2 SERPL-SCNC: 29 MMOL/L (ref 21–32)
CREAT SERPL-MCNC: 1.3 MG/DL (ref 0.8–1.5)
DIFFERENTIAL METHOD BLD: NORMAL
EOSINOPHIL # BLD: 0.3 K/UL (ref 0–0.8)
EOSINOPHIL NFR BLD: 4 % (ref 0.5–7.8)
ERYTHROCYTE [DISTWIDTH] IN BLOOD BY AUTOMATED COUNT: 14.5 % (ref 11.9–14.6)
GLUCOSE SERPL-MCNC: 137 MG/DL (ref 65–100)
HCT VFR BLD AUTO: 45.1 % (ref 41.1–50.3)
HGB BLD-MCNC: 14.3 G/DL (ref 13.6–17.2)
IMM GRANULOCYTES # BLD AUTO: 0 K/UL (ref 0–0.5)
IMM GRANULOCYTES NFR BLD AUTO: 0 % (ref 0–5)
LYMPHOCYTES # BLD: 1.8 K/UL (ref 0.5–4.6)
LYMPHOCYTES NFR BLD: 26 % (ref 13–44)
MCH RBC QN AUTO: 29.7 PG (ref 26.1–32.9)
MCHC RBC AUTO-ENTMCNC: 31.7 G/DL (ref 31.4–35)
MCV RBC AUTO: 93.6 FL (ref 82–102)
MONOCYTES # BLD: 0.5 K/UL (ref 0.1–1.3)
MONOCYTES NFR BLD: 7 % (ref 4–12)
NEUTS SEG # BLD: 4.3 K/UL (ref 1.7–8.2)
NEUTS SEG NFR BLD: 61 % (ref 43–78)
NRBC # BLD: 0 K/UL (ref 0–0.2)
NT PRO BNP: 68 PG/ML
PLATELET # BLD AUTO: 182 K/UL (ref 150–450)
PMV BLD AUTO: 11.8 FL (ref 9.4–12.3)
POTASSIUM SERPL-SCNC: 4.4 MMOL/L (ref 3.5–5.1)
RBC # BLD AUTO: 4.82 M/UL (ref 4.23–5.6)
SODIUM SERPL-SCNC: 140 MMOL/L (ref 133–143)
VIT B12 SERPL-MCNC: 295 PG/ML (ref 193–986)
WBC # BLD AUTO: 7.1 K/UL (ref 4.3–11.1)

## 2023-10-25 PROCEDURE — 3078F DIAST BP <80 MM HG: CPT | Performed by: STUDENT IN AN ORGANIZED HEALTH CARE EDUCATION/TRAINING PROGRAM

## 2023-10-25 PROCEDURE — 3074F SYST BP LT 130 MM HG: CPT | Performed by: STUDENT IN AN ORGANIZED HEALTH CARE EDUCATION/TRAINING PROGRAM

## 2023-10-25 PROCEDURE — 99214 OFFICE O/P EST MOD 30 MIN: CPT | Performed by: STUDENT IN AN ORGANIZED HEALTH CARE EDUCATION/TRAINING PROGRAM

## 2023-10-25 PROCEDURE — 3044F HG A1C LEVEL LT 7.0%: CPT | Performed by: STUDENT IN AN ORGANIZED HEALTH CARE EDUCATION/TRAINING PROGRAM

## 2023-10-25 PROCEDURE — 1123F ACP DISCUSS/DSCN MKR DOCD: CPT | Performed by: STUDENT IN AN ORGANIZED HEALTH CARE EDUCATION/TRAINING PROGRAM

## 2023-10-25 ASSESSMENT — ENCOUNTER SYMPTOMS: SHORTNESS OF BREATH: 1

## 2023-10-25 NOTE — PROGRESS NOTES
[address], [address] [facility]  Phone [phone]  Fax:  [phone]    Alice Fall (:  1945) is a 78 y.o. male here for evaluation of the following chief complaint(s):  Follow-up       ASSESSMENT/PLAN:  1. Controlled type 2 diabetes mellitus with diabetic neuropathy, without long-term current use of insulin ([address])  -     Basic Metabolic Panel; Future  -     CBC with Auto Differential; Future  -     AMB POC URINALYSIS DIP STICK AUTO W/O MICRO  2. Pernicious anemia  -     Vitamin B12; Future  3. SOB (shortness of breath)  -     Brain Natriuretic Peptide; Future  -     XR CHEST PA LAT (2 VIEWS); Future  4. Right leg pain  -     XR LUMBAR SPINE (2-3 VIEWS); Future  -     [facility] - Physical Therapy, [facility] Internal Clinics  5. Benign paroxysmal positional vertigo of left ear  [facility] - Physical Therapy, [facility] Internal Clinics  6. Bilateral hip pain    Reports some shortness of breath of the past few weeks. Check chest x-ray and BNP/BMP. Also reports occasional heart fluttering sensation over the past week. Advised him to follow-up with his cardiologist.  Emergency precautions discussed. Recommended patient get right ankle x-ray and ABIs that were previously ordered done for further evaluation of his pain. Given report of pain all over his right leg as well as bilateral hip pain, check lumbar spine x-ray. Advised patient to try Tylenol. Will refer patient to physical therapy. Episodes of vertigo with change in head position. Did get symptoms with Drummond-Hallpike maneuver to the left. Will refer to physical therapy for this. History of pernicious anemia. Check CBC and vitamin B12 level. Followed by urology for history of prostate cancer s/p prostatectomy, PSA 2023 was 0.014. Colonoscopy 2022 showed internal hemorrhoids, rectal varices, diverticulosis, and tubulovillous adenoma.   Followed by endocrinology for history of papillary thyroid

## 2023-10-25 NOTE — PATIENT INSTRUCTIONS
[address] [place] [address] [facility] - [address] [facility], [address] [place] [address]    [phone]     Get in with your cardiologist.  Get xrays. Go to physical therapy.

## 2023-10-30 ENCOUNTER — OFFICE VISIT (OUTPATIENT)
Dept: FAMILY MEDICINE CLINIC | Facility: CLINIC | Age: 78
End: 2023-10-30
Payer: MEDICARE

## 2023-10-30 VITALS
WEIGHT: 198.8 LBS | HEIGHT: 67 IN | SYSTOLIC BLOOD PRESSURE: 130 MMHG | OXYGEN SATURATION: 96 % | DIASTOLIC BLOOD PRESSURE: 82 MMHG | BODY MASS INDEX: 31.2 KG/M2 | HEART RATE: 85 BPM | TEMPERATURE: 98.6 F

## 2023-10-30 DIAGNOSIS — Z00.00 MEDICARE ANNUAL WELLNESS VISIT, SUBSEQUENT: Primary | ICD-10-CM

## 2023-10-30 DIAGNOSIS — Z12.11 ENCOUNTER FOR SCREENING COLONOSCOPY: ICD-10-CM

## 2023-10-30 PROCEDURE — 3075F SYST BP GE 130 - 139MM HG: CPT | Performed by: NURSE PRACTITIONER

## 2023-10-30 PROCEDURE — 1123F ACP DISCUSS/DSCN MKR DOCD: CPT | Performed by: NURSE PRACTITIONER

## 2023-10-30 PROCEDURE — G0439 PPPS, SUBSEQ VISIT: HCPCS | Performed by: NURSE PRACTITIONER

## 2023-10-30 PROCEDURE — 3079F DIAST BP 80-89 MM HG: CPT | Performed by: NURSE PRACTITIONER

## 2023-10-30 ASSESSMENT — PATIENT HEALTH QUESTIONNAIRE - PHQ9
1. LITTLE INTEREST OR PLEASURE IN DOING THINGS: 0
SUM OF ALL RESPONSES TO PHQ QUESTIONS 1-9: 0
SUM OF ALL RESPONSES TO PHQ QUESTIONS 1-9: 0
SUM OF ALL RESPONSES TO PHQ9 QUESTIONS 1 & 2: 0
2. FEELING DOWN, DEPRESSED OR HOPELESS: 0
SUM OF ALL RESPONSES TO PHQ QUESTIONS 1-9: 0
SUM OF ALL RESPONSES TO PHQ QUESTIONS 1-9: 0

## 2023-10-30 ASSESSMENT — LIFESTYLE VARIABLES
HOW OFTEN DO YOU HAVE A DRINK CONTAINING ALCOHOL: NEVER
HOW MANY STANDARD DRINKS CONTAINING ALCOHOL DO YOU HAVE ON A TYPICAL DAY: PATIENT DOES NOT DRINK

## 2023-10-30 NOTE — PATIENT INSTRUCTIONS

## 2023-10-31 ENCOUNTER — HOSPITAL ENCOUNTER (OUTPATIENT)
Dept: GENERAL RADIOLOGY | Age: 78
Discharge: HOME | End: 2023-11-03
Payer: MEDICARE

## 2023-10-31 DIAGNOSIS — R06.02 SOB (SHORTNESS OF BREATH): ICD-10-CM

## 2023-10-31 DIAGNOSIS — M79.604 RIGHT LEG PAIN: ICD-10-CM

## 2023-10-31 DIAGNOSIS — M25.571 CHRONIC PAIN OF RIGHT ANKLE: ICD-10-CM

## 2023-10-31 DIAGNOSIS — G89.29 CHRONIC PAIN OF RIGHT ANKLE: ICD-10-CM

## 2023-10-31 PROCEDURE — 71046 X-RAY EXAM CHEST 2 VIEWS: CPT

## 2023-10-31 PROCEDURE — 72100 X-RAY EXAM L-S SPINE 2/3 VWS: CPT

## 2023-10-31 PROCEDURE — 73610 X-RAY EXAM OF ANKLE: CPT

## 2023-12-08 ENCOUNTER — OFFICE VISIT (OUTPATIENT)
Dept: GASTROENTEROLOGY | Age: 78
End: 2023-12-08
Payer: MEDICARE

## 2023-12-08 ENCOUNTER — PREP FOR PROCEDURE (OUTPATIENT)
Dept: GASTROENTEROLOGY | Age: 78
End: 2023-12-08

## 2023-12-08 ENCOUNTER — OFFICE VISIT (OUTPATIENT)
Dept: FAMILY MEDICINE CLINIC | Facility: CLINIC | Age: 78
End: 2023-12-08
Payer: MEDICARE

## 2023-12-08 VITALS
BODY MASS INDEX: 31.71 KG/M2 | HEIGHT: 67 IN | WEIGHT: 202 LBS | SYSTOLIC BLOOD PRESSURE: 144 MMHG | OXYGEN SATURATION: 98 % | DIASTOLIC BLOOD PRESSURE: 82 MMHG | TEMPERATURE: 97 F | HEART RATE: 96 BPM

## 2023-12-08 VITALS
HEIGHT: 67 IN | RESPIRATION RATE: 16 BRPM | BODY MASS INDEX: 33.59 KG/M2 | HEART RATE: 76 BPM | SYSTOLIC BLOOD PRESSURE: 138 MMHG | WEIGHT: 214 LBS | OXYGEN SATURATION: 98 % | DIASTOLIC BLOOD PRESSURE: 80 MMHG

## 2023-12-08 DIAGNOSIS — N39.46 MIXED STRESS AND URGE URINARY INCONTINENCE: ICD-10-CM

## 2023-12-08 DIAGNOSIS — Z12.11 ENCOUNTER FOR COLONOSCOPY DUE TO HISTORY OF ADENOMATOUS COLONIC POLYPS: ICD-10-CM

## 2023-12-08 DIAGNOSIS — Z86.010 ENCOUNTER FOR COLONOSCOPY DUE TO HISTORY OF ADENOMATOUS COLONIC POLYPS: ICD-10-CM

## 2023-12-08 DIAGNOSIS — D12.6 TUBULOVILLOUS ADENOMA OF COLON: Primary | ICD-10-CM

## 2023-12-08 DIAGNOSIS — R13.19 ESOPHAGEAL DYSPHAGIA: ICD-10-CM

## 2023-12-08 DIAGNOSIS — E53.8 B12 DEFICIENCY: ICD-10-CM

## 2023-12-08 DIAGNOSIS — Z23 NEED FOR IMMUNIZATION AGAINST INFLUENZA: ICD-10-CM

## 2023-12-08 DIAGNOSIS — R20.2 PARESTHESIA OF BOTH LOWER EXTREMITIES: Primary | ICD-10-CM

## 2023-12-08 PROBLEM — R13.10 DYSPHAGIA: Status: ACTIVE | Noted: 2023-12-08

## 2023-12-08 PROCEDURE — 3079F DIAST BP 80-89 MM HG: CPT | Performed by: STUDENT IN AN ORGANIZED HEALTH CARE EDUCATION/TRAINING PROGRAM

## 2023-12-08 PROCEDURE — 99213 OFFICE O/P EST LOW 20 MIN: CPT | Performed by: STUDENT IN AN ORGANIZED HEALTH CARE EDUCATION/TRAINING PROGRAM

## 2023-12-08 PROCEDURE — 90694 VACC AIIV4 NO PRSRV 0.5ML IM: CPT | Performed by: STUDENT IN AN ORGANIZED HEALTH CARE EDUCATION/TRAINING PROGRAM

## 2023-12-08 PROCEDURE — 99203 OFFICE O/P NEW LOW 30 MIN: CPT | Performed by: PHYSICIAN ASSISTANT

## 2023-12-08 PROCEDURE — G0008 ADMIN INFLUENZA VIRUS VAC: HCPCS | Performed by: STUDENT IN AN ORGANIZED HEALTH CARE EDUCATION/TRAINING PROGRAM

## 2023-12-08 PROCEDURE — 3075F SYST BP GE 130 - 139MM HG: CPT | Performed by: PHYSICIAN ASSISTANT

## 2023-12-08 PROCEDURE — 1123F ACP DISCUSS/DSCN MKR DOCD: CPT | Performed by: STUDENT IN AN ORGANIZED HEALTH CARE EDUCATION/TRAINING PROGRAM

## 2023-12-08 PROCEDURE — 96372 THER/PROPH/DIAG INJ SC/IM: CPT | Performed by: STUDENT IN AN ORGANIZED HEALTH CARE EDUCATION/TRAINING PROGRAM

## 2023-12-08 PROCEDURE — 3079F DIAST BP 80-89 MM HG: CPT | Performed by: PHYSICIAN ASSISTANT

## 2023-12-08 PROCEDURE — 3077F SYST BP >= 140 MM HG: CPT | Performed by: STUDENT IN AN ORGANIZED HEALTH CARE EDUCATION/TRAINING PROGRAM

## 2023-12-08 PROCEDURE — 1123F ACP DISCUSS/DSCN MKR DOCD: CPT | Performed by: PHYSICIAN ASSISTANT

## 2023-12-08 RX ORDER — CYANOCOBALAMIN 1000 UG/ML
1000 INJECTION, SOLUTION INTRAMUSCULAR; SUBCUTANEOUS ONCE
Status: COMPLETED | OUTPATIENT
Start: 2023-12-08 | End: 2023-12-08

## 2023-12-08 RX ORDER — LEVOTHYROXINE SODIUM 112 UG/1
112 TABLET ORAL DAILY
Qty: 90 TABLET | Refills: 3 | Status: SHIPPED | OUTPATIENT
Start: 2023-12-08

## 2023-12-08 RX ADMIN — CYANOCOBALAMIN 1000 MCG: 1000 INJECTION, SOLUTION INTRAMUSCULAR; SUBCUTANEOUS at 15:45

## 2023-12-08 NOTE — PATIENT INSTRUCTIONS
Call number below and tell them you need to schedule CHANTE of legs.   [facility]  [address] [facility], [facility], [address] [place] [address],[address]  [phone]

## 2023-12-08 NOTE — PROGRESS NOTES
[address], [address] [place] [address]  Phone [phone]  Fax:  [phone]    Jose Trujillo (:  1945) is a 70 y.o. male here for evaluation of the following chief complaint(s):  Hypothyroidism (refill)       ASSESSMENT/PLAN:  1. Paresthesia of both lower extremities  -     [address] (NCS/Nerve Conduction)  2. B12 deficiency  -     cyanocobalamin injection 1,000 mcg; 1,000 mcg, IntraMUSCular, ONCE, 1 dose, On 23 at 1615  3. Mixed stress and urge urinary incontinence  4. Need for immunization against influenza  -     Influenza, FLUAD, (age 72 y+), IM, Preservative Free, 0.5 mL    Reports numbness/tingling in both legs from feet up to knees. Does have a history of pernicious anemia, vitamin B12 level in October was low normal.  B12 injection given today. Will check NCS of lower extremities. Advised patient to call and schedule ABIs that were previously ordered. Reports worsening stress/urge urinary incontinence that started following prostatectomy. Handout on bladder training/Kegels given to patient. Advised him to see his urologist.    Followed by urology for history of prostate cancer s/p prostatectomy, PSA 2023 was 0.014. Colonoscopy 2022 showed internal hemorrhoids, rectal varices, diverticulosis, and tubulovillous adenoma. Patient is scheduled for repeat colonoscopy 1/15/2024. Followed by endocrinology for history of papillary thyroid carcinoma s/p total thyroidectomy . Followed by cardiology for CAD. Return in about 4 months (around 2024) for routine f/u. Subjective   SUBJECTIVE/OBJECTIVE:  HPI  74-year-old male with PMH of DM2, HTN, CAD, CKD, GERD, prostate cancer s/p prostatectomy, papillary thyroid carcinoma, hypothyroidism, pernicious anemia, and HLD who presents for 6-week follow-up of multiple issues.   -Reports some stress/urge urinary incontinence, started after prostatectomy and has

## 2023-12-08 NOTE — PROGRESS NOTES
12/08/23 1015   BP: 138/80   Pulse: 76   Resp: 16   SpO2: 98%       Physical Exam  Vitals reviewed. Constitutional:       General: He is not in acute distress. Appearance: He is not ill-appearing, toxic-appearing or diaphoretic. Eyes:      General: No scleral icterus. Cardiovascular:      Rate and Rhythm: Normal rate. Pulmonary:      Effort: Pulmonary effort is normal. No respiratory distress. Breath sounds: No stridor. No wheezing, rhonchi or rales. Abdominal:      General: There is no distension. Palpations: Abdomen is soft. Tenderness: There is no abdominal tenderness. There is no guarding or rebound. Musculoskeletal:      Cervical back: Neck supple. Lymphadenopathy:      Cervical: Cervical adenopathy (on the left, mildly tender) present. Skin:     General: Skin is warm and dry. Neurological:      General: No focal deficit present. Mental Status: He is alert and oriented to person, place, and time. Psychiatric:         Mood and Affect: Mood normal.         Behavior: Behavior normal.         Thought Content: Thought content normal.         Judgment: Judgment normal.              Return for scheduled EGD, scheduled colonoscopy. An electronic signature was used to authenticate this note.     --Kaylah Corbett PA-C

## 2023-12-11 RX ORDER — SODIUM CHLORIDE 0.9 % (FLUSH) 0.9 %
5-40 SYRINGE (ML) INJECTION PRN
Status: CANCELLED | OUTPATIENT
Start: 2023-12-11

## 2023-12-11 RX ORDER — SODIUM CHLORIDE 9 MG/ML
25 INJECTION, SOLUTION INTRAVENOUS PRN
Status: CANCELLED | OUTPATIENT
Start: 2023-12-11

## 2023-12-11 RX ORDER — SODIUM CHLORIDE 0.9 % (FLUSH) 0.9 %
5-40 SYRINGE (ML) INJECTION EVERY 12 HOURS SCHEDULED
Status: CANCELLED | OUTPATIENT
Start: 2023-12-11

## 2024-01-03 ENCOUNTER — PROCEDURE VISIT (OUTPATIENT)
Dept: NEUROLOGY | Age: 79
End: 2024-01-03
Payer: MEDICARE

## 2024-01-03 VITALS — HEART RATE: 66 BPM | WEIGHT: 211 LBS | OXYGEN SATURATION: 90 % | BODY MASS INDEX: 33.12 KG/M2 | HEIGHT: 67 IN

## 2024-01-03 DIAGNOSIS — R20.2 PARESTHESIA OF BOTH LOWER EXTREMITIES: Primary | ICD-10-CM

## 2024-01-03 DIAGNOSIS — R29.3 POSTURAL IMBALANCE: ICD-10-CM

## 2024-01-03 DIAGNOSIS — E11.40 CONTROLLED TYPE 2 DIABETES MELLITUS WITH DIABETIC NEUROPATHY, WITHOUT LONG-TERM CURRENT USE OF INSULIN (HCC): ICD-10-CM

## 2024-01-03 PROCEDURE — 1123F ACP DISCUSS/DSCN MKR DOCD: CPT | Performed by: PSYCHIATRY & NEUROLOGY

## 2024-01-03 PROCEDURE — 99202 OFFICE O/P NEW SF 15 MIN: CPT | Performed by: PSYCHIATRY & NEUROLOGY

## 2024-01-03 PROCEDURE — 95885 MUSC TST DONE W/NERV TST LIM: CPT | Performed by: PSYCHIATRY & NEUROLOGY

## 2024-01-03 PROCEDURE — 95910 NRV CNDJ TEST 7-8 STUDIES: CPT | Performed by: PSYCHIATRY & NEUROLOGY

## 2024-01-03 ASSESSMENT — VISUAL ACUITY: OU: 1

## 2024-01-03 NOTE — PROGRESS NOTES
EMG/Nerve Conduction Study Procedure Note  [address]    [address]  [place]  [address]   [phone]      Hx:    Exam:     78 y.o.   W  W male right-handed.  Diabetic.  Paresthesia of feet.  Bilateral.  Previous falls.  No specific chemo etc.  Does appear to be hammering at the present time with some higher arches and he did have a mother with feet problems.  Referring: Dr. Giuliano Noel  Technologist: Surya Pantoja  Height: 5 foot 7 inch        Summary            needle EMG of select muscles lower extremities with conduction velocity testing.          Controlled environmental factors / EMG lab.  Temperature.   NCV : sensory segments:    Markedly abnormal = ABSENT bilateral SNAP of the sural nerves.  NCV plantar sensory segments:     Deferred.  NCV Motor MCV segments:     Basically normal bilateral peroneal and tibial MCV.  F-wave studies:         Normal bilateral peroneal and tibial F waves.  H-REFLEX Studies:    Borderline bilateral H-reflexes.   NEEDLE EMG:   Tested muscles::    Normal bilateral TA MG VL muscles tested.      INTERPRETATION:        ELECTROPHYSIOLOGIC TESTING REVEALING MINIMAL CHANGES OF THE LOWER EXTREMITIES SUSPECT SENSORY NEUROPATHY BEGINNING.  COULD BE THE BEGINNINGS OF SENSORIMOTOR NEUROPATHY.  UNDERLYING ENDOCRINOPATHY SUSPECTED.  NO MYOPATHY MYOTONIA OR FASCICULATIONS NOTABLE.  NO PROXIMAL ABNORMALITIES.         CONCLUSION:      Suspected early neuropathy = sensory so far.      Procedure Details:       Correlates with history of fall and diabetes and likely progressive neuropathy that could also be early features of hereditary motor or sensory neuropathy etc.    Patient made aware                  Please Note::     Data and waveforms * filed under Procedure category [facility].    See Procedure Files for complete data pages.     *Extra to EMG Time*  See accompanying consultation.     Separate and Extra E/M Time to EMG Time*  ==         Patient is referred for consultation

## 2024-01-03 NOTE — PROGRESS NOTES
1/3/2024  Scott Cm     Patient is referred by the following provider for consultation regarding as below:     paresthesia feet.     Dear      Giuliano Noel MD                                    NEUROLOGY  brief    CONSULTATION                   Chief Complaint:  Falls   Feet and gait   Imbalance   diabetes      Diabetic WWMale w fall risk increasing and leg / feet problems.                        right handed 78 y.o.      male w at least one fall.      * I reviewed the available and pertinent records - including eHR and Care Everywhere - notes of PMHx, PSHx, Fam Hx, and  and have examined patient with the following findings:       IMAGING REVIEW:  I REVIEWED PERTINENT  IMAGES AND REPORTS WITH THE PATIENT PERSONALLY, DIRECTLY AND FULLY.  7 extra  MINUTES.     Past Medical History:  Past Medical History:   Diagnosis Date    Anxiety     r/t difficulty swallowing    Arthritis     CAD (coronary artery disease)     stent placed 2003, Folowed by Dr. Pulido     Chronic pain     Knees, hips, feet and neck     Diabetes ([facility]) 2003    type 2; oral meds, avg blood sugar-100-120, Last A1C 6.4 on 5/10/19, Hypoglycemic signs at 50    Dysphasia     Esophageal dilatation     x 2 times, last in 2-12     GERD (gastroesophageal reflux disease)     controlled with as needed medication    Gout     Hypercholesteremia     Daily meds     Hypertension     Managed with medication    Kidney stones     hx    Sleep apnea     Pt states history of and no longer uses c-pap machine     Stroke ([facility])     2003-TIA- no deficits     Unspecified adverse effect of anesthesia        Past Surgical History:  Past Surgical History:   Procedure Laterality Date    CARDIAC CATHETERIZATION  2003    One stent     COLONOSCOPY  2016    repeat in 2019    COLONOSCOPY N/A 9/8/2017    COLONOSCOPY/ 34 performed by Vincent Perkins MD at [facility] ENDOSCOPY    COLONOSCOPY N/A 4/20/2022    COLONOSCOPY  performed by Earl Draper MD at [facility] ENDOSCOPY

## 2024-01-12 NOTE — PROGRESS NOTES
Unable to reach patient regarding procedure time. No identifiers on voicemail. Left message with arrival time, NPO status, and callback number for further instructions.

## 2024-01-15 ENCOUNTER — HOSPITAL ENCOUNTER (OUTPATIENT)
Age: 79
Setting detail: OUTPATIENT SURGERY
Discharge: HOME | End: 2024-01-15
Attending: STUDENT IN AN ORGANIZED HEALTH CARE EDUCATION/TRAINING PROGRAM | Admitting: STUDENT IN AN ORGANIZED HEALTH CARE EDUCATION/TRAINING PROGRAM
Payer: MEDICARE

## 2024-01-15 ENCOUNTER — ANESTHESIA (OUTPATIENT)
Dept: ENDOSCOPY | Age: 79
End: 2024-01-15
Payer: MEDICARE

## 2024-01-15 ENCOUNTER — ANESTHESIA EVENT (OUTPATIENT)
Dept: ENDOSCOPY | Age: 79
End: 2024-01-15
Payer: MEDICARE

## 2024-01-15 VITALS
TEMPERATURE: 97.5 F | RESPIRATION RATE: 18 BRPM | SYSTOLIC BLOOD PRESSURE: 112 MMHG | HEART RATE: 78 BPM | WEIGHT: 203 LBS | OXYGEN SATURATION: 99 % | HEIGHT: 67 IN | DIASTOLIC BLOOD PRESSURE: 60 MMHG | BODY MASS INDEX: 31.86 KG/M2

## 2024-01-15 PROBLEM — Z12.11 COLON CANCER SCREENING: Status: ACTIVE | Noted: 2024-01-15

## 2024-01-15 LAB
GLUCOSE BLD STRIP.AUTO-MCNC: 123 MG/DL (ref 65–100)
SERVICE CMNT-IMP: ABNORMAL

## 2024-01-15 PROCEDURE — 2709999900 HC NON-CHARGEABLE SUPPLY: Performed by: STUDENT IN AN ORGANIZED HEALTH CARE EDUCATION/TRAINING PROGRAM

## 2024-01-15 PROCEDURE — C1889 IMPLANT/INSERT DEVICE, NOC: HCPCS | Performed by: STUDENT IN AN ORGANIZED HEALTH CARE EDUCATION/TRAINING PROGRAM

## 2024-01-15 PROCEDURE — 3700000001 HC ADD 15 MINUTES (ANESTHESIA): Performed by: STUDENT IN AN ORGANIZED HEALTH CARE EDUCATION/TRAINING PROGRAM

## 2024-01-15 PROCEDURE — C1769 GUIDE WIRE: HCPCS | Performed by: STUDENT IN AN ORGANIZED HEALTH CARE EDUCATION/TRAINING PROGRAM

## 2024-01-15 PROCEDURE — 3609012700 HC EGD DILATION SAVORY: Performed by: STUDENT IN AN ORGANIZED HEALTH CARE EDUCATION/TRAINING PROGRAM

## 2024-01-15 PROCEDURE — 6360000002 HC RX W HCPCS: Performed by: REGISTERED NURSE

## 2024-01-15 PROCEDURE — 3609010600 HC COLONOSCOPY POLYPECTOMY SNARE/COLD BIOPSY: Performed by: STUDENT IN AN ORGANIZED HEALTH CARE EDUCATION/TRAINING PROGRAM

## 2024-01-15 PROCEDURE — 88305 TISSUE EXAM BY PATHOLOGIST: CPT

## 2024-01-15 PROCEDURE — 3700000000 HC ANESTHESIA ATTENDED CARE: Performed by: STUDENT IN AN ORGANIZED HEALTH CARE EDUCATION/TRAINING PROGRAM

## 2024-01-15 PROCEDURE — 7100000011 HC PHASE II RECOVERY - ADDTL 15 MIN: Performed by: STUDENT IN AN ORGANIZED HEALTH CARE EDUCATION/TRAINING PROGRAM

## 2024-01-15 PROCEDURE — 2580000003 HC RX 258: Performed by: ANESTHESIOLOGY

## 2024-01-15 PROCEDURE — 82962 GLUCOSE BLOOD TEST: CPT

## 2024-01-15 PROCEDURE — 7100000010 HC PHASE II RECOVERY - FIRST 15 MIN: Performed by: STUDENT IN AN ORGANIZED HEALTH CARE EDUCATION/TRAINING PROGRAM

## 2024-01-15 PROCEDURE — 2580000003 HC RX 258: Performed by: REGISTERED NURSE

## 2024-01-15 PROCEDURE — 2500000003 HC RX 250 WO HCPCS: Performed by: REGISTERED NURSE

## 2024-01-15 DEVICE — WORKING LENGTH 235CM, WORKING CHANNEL 2.8MM
Type: IMPLANTABLE DEVICE | Status: FUNCTIONAL
Brand: RESOLUTION 360 CLIP

## 2024-01-15 RX ORDER — SODIUM CHLORIDE, SODIUM LACTATE, POTASSIUM CHLORIDE, CALCIUM CHLORIDE 600; 310; 30; 20 MG/100ML; MG/100ML; MG/100ML; MG/100ML
INJECTION, SOLUTION INTRAVENOUS CONTINUOUS
Status: DISCONTINUED | OUTPATIENT
Start: 2024-01-15 | End: 2024-01-15 | Stop reason: HOSPADM

## 2024-01-15 RX ORDER — EPHEDRINE SULFATE/0.9% NACL/PF 50 MG/5 ML
SYRINGE (ML) INTRAVENOUS PRN
Status: DISCONTINUED | OUTPATIENT
Start: 2024-01-15 | End: 2024-01-15 | Stop reason: SDUPTHER

## 2024-01-15 RX ORDER — SODIUM CHLORIDE 9 MG/ML
INJECTION, SOLUTION INTRAVENOUS PRN
Status: DISCONTINUED | OUTPATIENT
Start: 2024-01-15 | End: 2024-01-15 | Stop reason: HOSPADM

## 2024-01-15 RX ORDER — LIDOCAINE HYDROCHLORIDE 20 MG/ML
INJECTION, SOLUTION EPIDURAL; INFILTRATION; INTRACAUDAL; PERINEURAL PRN
Status: DISCONTINUED | OUTPATIENT
Start: 2024-01-15 | End: 2024-01-15 | Stop reason: SDUPTHER

## 2024-01-15 RX ORDER — SODIUM CHLORIDE 0.9 % (FLUSH) 0.9 %
5-40 SYRINGE (ML) INJECTION PRN
Status: DISCONTINUED | OUTPATIENT
Start: 2024-01-15 | End: 2024-01-15 | Stop reason: HOSPADM

## 2024-01-15 RX ORDER — SODIUM CHLORIDE 0.9 % (FLUSH) 0.9 %
5-40 SYRINGE (ML) INJECTION EVERY 12 HOURS SCHEDULED
Status: DISCONTINUED | OUTPATIENT
Start: 2024-01-15 | End: 2024-01-15 | Stop reason: HOSPADM

## 2024-01-15 RX ORDER — PROPOFOL 10 MG/ML
INJECTION, EMULSION INTRAVENOUS PRN
Status: DISCONTINUED | OUTPATIENT
Start: 2024-01-15 | End: 2024-01-15 | Stop reason: SDUPTHER

## 2024-01-15 RX ORDER — SODIUM CHLORIDE 9 MG/ML
25 INJECTION, SOLUTION INTRAVENOUS PRN
Status: DISCONTINUED | OUTPATIENT
Start: 2024-01-15 | End: 2024-01-15 | Stop reason: HOSPADM

## 2024-01-15 RX ORDER — LIDOCAINE HYDROCHLORIDE 10 MG/ML
1 INJECTION, SOLUTION INFILTRATION; PERINEURAL
Status: DISCONTINUED | OUTPATIENT
Start: 2024-01-15 | End: 2024-01-15 | Stop reason: HOSPADM

## 2024-01-15 RX ADMIN — PHENYLEPHRINE HYDROCHLORIDE 100 MCG: 10 INJECTION INTRAVENOUS at 07:42

## 2024-01-15 RX ADMIN — PROPOFOL 50 MG: 10 INJECTION, EMULSION INTRAVENOUS at 07:16

## 2024-01-15 RX ADMIN — PHENYLEPHRINE HYDROCHLORIDE 100 MCG: 10 INJECTION INTRAVENOUS at 07:48

## 2024-01-15 RX ADMIN — LIDOCAINE HYDROCHLORIDE 40 MG: 20 INJECTION, SOLUTION EPIDURAL; INFILTRATION; INTRACAUDAL; PERINEURAL at 07:16

## 2024-01-15 RX ADMIN — PROPOFOL 180 MCG/KG/MIN: 10 INJECTION, EMULSION INTRAVENOUS at 07:17

## 2024-01-15 RX ADMIN — SODIUM CHLORIDE, POTASSIUM CHLORIDE, SODIUM LACTATE AND CALCIUM CHLORIDE: 600; 310; 30; 20 INJECTION, SOLUTION INTRAVENOUS at 06:33

## 2024-01-15 RX ADMIN — PHENYLEPHRINE HYDROCHLORIDE 100 MCG: 10 INJECTION INTRAVENOUS at 07:36

## 2024-01-15 RX ADMIN — Medication 5 MG: at 07:33

## 2024-01-15 ASSESSMENT — PAIN - FUNCTIONAL ASSESSMENT: PAIN_FUNCTIONAL_ASSESSMENT: NONE - DENIES PAIN

## 2024-01-15 NOTE — OP NOTE
Endoscopy Note          Operative Report    Patient: Scott Cm MRN: [id number]      YOB: 1945  Age: 78 y.o.  Sex: male            Indications:  Dysphagia    Preoperative Evaluation: The patient was evaluated prior to the procedure in the GI lab admission area, the patient ASA was recorded .  Consent was obtained from the patient with the risk of perforation bleeding and aspiration.    Anesthesia: OLIVA-per anesthesia  Complications: None  EBL: Unremarkable  Procedure: The patient was sedated in the left lateral decubitus position. Scope was advance from the mouth to the third portion of the duodenum. The scope was withdrawn to the stomach and a refroflexed view was performed.     Findings:  Normal esophagus, small hiatal hernia, irregular Z-line, biopsies were obtained for Yang's esophagus.  Empiric dilation was done with savary 51 [place], no mucosal rent on postdilation exam.  Normal-appearing stomach and duodenum.    Postoperative Diagnosis:  Hiatal hernia.     Recommendations:   Continue Protonix 40 mg daily given this controlling her reflux symptoms.  If you have persistent dysphagia then we could consider esophageal manometry.    Signed By:  Aliyah Newman MD     January 15, 2024

## 2024-01-15 NOTE — OP NOTE
Operative Report    Patient: Scott Cm MRN: [id number]      YOB: 1945  Age: 78 y.o.  Sex: male            Indications:  Screening    Preoperative Evaluation: The patient was evaluated prior to the procedure in the GI lab admission area, the patient ASA was recorded .  Consent was obtained from the patient with the risk of perforation bleeding and aspiration.    Anesthesia: OLIVA-per anesthesia    Complications: None; patient tolerated the procedure well.    EBL -insignificant      Procedure: The patient was sedated in the left lateral decubitus position.  Scope was advanced from the rectum to the cecum.  Evaluation was performed to the cecum twice.  The scope was withdrawn to the rectum, retroflexed view was performed.  The rectal exam was normal.  Preparation was adequate [place] score of 9 .    Findings:    1 polyp located in the ascending colon, 5 mm in size, removed with cold snare polypectomy.  Previous EMR site was located with tattoo, there was a residual sessile polyp, 12 mm in size, removed via hot snare polypectomy. One clip was placed to close the defect.   There were 4 polyps located in the transverse colon, size ranging between 5 to 8 mm, removed via cold snare polypectomy.  Otherwise normal-appearing colon.    Postoperative Diagnosis:   6 polyps.     Recommendations:   Interval of the next colonoscopy will be determined by pending pathology, likely 1 years)    Signed By:  Aliyah Newman MD     January 15, 2024

## 2024-01-15 NOTE — H&P
Scott Cm (:  1945) is a 78 y.o. male new patient referred to our office for evaluation of the following chief complaint(s):  New Patient (Encounter for screening Colonoscopy)           ASSESSMENT/PLAN:  1. Tubulovillous adenoma of colon  2. Encounter for colonoscopy due to history of adenomatous colonic polyps  3. Esophageal dysphagia        Multiple colonoscopies through [facility] with large TVA removal requiring EMR. Obtain [facility] records for review; patient left without signing records release form. Will mail the release form for him to sign. A/w solid-food dysphagia, preferentially swallowing on the right side. Has some cervical LAD on the left that is mildly tender; endorses recent onset cold symptoms. Should be monitored for resolution versus further evaluation. If EGD unrevealing can consider speech referral. Schedule EGD and colonoscopy.     Subjective   SUBJECTIVE/OBJECTIVE  Scott Cm is a 78 y.o. year old male with PMH is pertinent for HTN, HPL, NIDDM, CAD, HOLLY, CVA, LVDD, GERD, papillary thyroid carcinoma, prostate cancer s/p prostatectomy (declined radiation), diabetic neuropathy, nephrolithiasis, chronic pain, anemia, gout. Surgical history is pertinent for total thyroidectomy, cardiac stent placement. Med list is pertinent for Protonix 40 mg QD, Aspirin 81 mg QD, Levsin TID PRN.      Patient was referred to our office for screening colonoscopy. Referral notes reviewed from 10/30/23. Prior pertinent GI evaluation includes:      -Laryngoscopy and bronchoscopy 3/5/12 (Dr. Austin)  -Colonoscopy 6/15/17 (Dr. Vincent Perkins): descending colon, sigmoid colon polyps TVA; recall 3 months for EMR  -Colonoscopy 17 (Dr. Vincent Perkins): hepatic flexure polyps 7 mm and 20-25 mm (2); hepatic flexure colon polyp TVA  -EGD 22 (Dr. Draper): gastric biopsies with features of repair, negative H.pylori  -Colonoscopy 22 (Dr. Draper): gaines-diverticulosis, large carpeting transverse colon polyp s/p

## 2024-01-15 NOTE — ANESTHESIA POSTPROCEDURE EVALUATION
Department of Anesthesiology  Postprocedure Note    Patient: Scott Cm  MRN: [id number]  YOB: 1945  Date of evaluation: 1/15/2024    Procedure Summary       Date: 01/15/24 Room / Location: [facility] ENDO 05 / [facility] ENDOSCOPY    Anesthesia Start: 0711 Anesthesia Stop: 0806    Procedures:       COLONOSCOPY POLYPECTOMY SNARE/COLD and HOT      EGD DILATION biopsy (Upper GI Region) Diagnosis:       Dysphagia      (Dysphagia [R13.10])    Surgeons: Aliyah Newman MD Responsible Provider: Danyel Álvarez MD    Anesthesia Type: TIVA ASA Status: 3            Anesthesia Type: No value filed.    Danni Phase I: Danni Score: 10    Danni Phase II: Danni Score: 10    Anesthesia Post Evaluation    Patient location during evaluation: PACU  Patient participation: complete - patient participated  Level of consciousness: awake and alert  Airway patency: patent  Nausea & Vomiting: no nausea and no vomiting  Cardiovascular status: hemodynamically stable  Respiratory status: acceptable, nonlabored ventilation and spontaneous ventilation  Hydration status: euvolemic  Comments: /60   Pulse 78   Temp 97.5 °F (36.4 °C) (Axillary)   Resp 18   Ht 1.702 m (5' 7\")   Wt 92.1 kg (203 lb)   SpO2 99%   BMI 31.79 kg/m²     Multimodal analgesia pain management approach  Pain management: adequate and satisfactory to patient    No notable events documented.

## 2024-01-15 NOTE — DISCHARGE INSTRUCTIONS
Gastrointestinal Colonoscopy/Flexible Sigmoidoscopy - Lower Exam Discharge Instructions  Call Dr. BRANCH for any problems or questions.  Contact the doctor’s office for follow up appointment as directed  Medication may cause drowsiness for several hours, therefore, do not drive or operate machinery for remainder of the day.  No alcohol today.  Do not make any important decisions such signing legal paperwork.  Ordinarily, you may resume regular diet and activity after exam unless otherwise specified by your physician.  Because of air put into your colon during exam, you may experience some abdominal distension, relieved by the passage of gas, for several hours.  Contact your physician if you have any of the following:  Excessive amount of bleeding - large amount when having a bowel movement.  Occasional specks of blood with bowel movement would not be unusual.  Severe abdominal pain  Fever or Chills  Polyp Removal - follow these additional instructions  Clear liquid diet for the next meal (jell-o, broth, clear drinks)  Soft diet for 24 hours, then resume regular diet   Take Metamucil - 1 tablespoon in juice every morning for 3 days, if needed.  No Aspirin, Advil, Aleve, Nuprin, Ibuprofen, or medications that contain these drugs for 2 weeks.      MEDICATION INTERACTION:  During your procedure you potentially received a medication or medications which may reduce the effectiveness of oral contraceptives. Please consider other forms of contraception for 1 month following your procedure if you are currently using oral contraceptives as your primary form of birth control. In addition to this, we recommend continuing your oral contraceptive as prescribed, unless otherwise instructed by your physician, during this time    After general anesthesia or intravenous sedation, for 24 hours or while taking prescription Narcotics:  Limit your activities  Some people will feel drowsy or dizzy for up to a few hours after waking

## 2024-01-15 NOTE — ANESTHESIA PRE PROCEDURE
98%   Weight: 92.1 kg (203 lb)   Height: 1.702 m (5' 7\")                                              BP Readings from Last 3 Encounters:   01/15/24 (!) 145/68   12/08/23 (!) 144/82   12/08/23 138/80       NPO Status: Time of last liquid consumption: 0530 (sips with meds)                        Time of last solid consumption: 1900                        Date of last liquid consumption: 01/15/24                        Date of last solid food consumption: 01/13/24    BMI:   Wt Readings from Last 3 Encounters:   01/15/24 92.1 kg (203 lb)   01/03/24 95.7 kg (211 lb)   12/08/23 91.6 kg (202 lb)     Body mass index is 31.79 kg/m².    CBC:   Lab Results   Component Value Date/Time    WBC 7.1 10/25/2023 11:40 AM    RBC 4.82 10/25/2023 11:40 AM    HGB 14.3 10/25/2023 11:40 AM    HCT 45.1 10/25/2023 11:40 AM    MCV 93.6 10/25/2023 11:40 AM    RDW 14.5 10/25/2023 11:40 AM     10/25/2023 11:40 AM       CMP:   Lab Results   Component Value Date/Time     10/25/2023 11:40 AM    K 4.4 10/25/2023 11:40 AM     10/25/2023 11:40 AM    CO2 29 10/25/2023 11:40 AM    BUN 19 10/25/2023 11:40 AM    CREATININE 1.30 10/25/2023 11:40 AM    GFRAA >60 05/25/2022 07:27 PM    AGRATIO 1.2 04/18/2022 01:23 PM    LABGLOM 57 10/25/2023 11:40 AM    GLUCOSE 137 10/25/2023 11:40 AM    PROT 6.8 09/07/2023 09:04 AM    CALCIUM 9.3 10/25/2023 11:40 AM    BILITOT 0.6 09/07/2023 09:04 AM    ALKPHOS 68 09/07/2023 09:04 AM    ALKPHOS 73 04/18/2022 01:23 PM    AST 18 09/07/2023 09:04 AM    ALT 16 09/07/2023 09:04 AM       POC Tests:   Recent Labs     01/15/24  0630   POCGLU 123*       Coags: No results found for: \"PROTIME\", \"INR\", \"APTT\"    HCG (If Applicable): No results found for: \"PREGTESTUR\", \"PREGSERUM\", \"HCG\", \"HCGQUANT\"     ABGs: No results found for: \"PHART\", \"PO2ART\", \"VEA7CCE\", \"RMX5EJA\", \"BEART\", \"L3GQQMIQ\"     Type & Screen (If Applicable):  No results found for: \"LABABO\", \"LABRH\"    Drug/Infectious Status (If Applicable):  Lab

## 2024-01-31 ENCOUNTER — OFFICE VISIT (OUTPATIENT)
Dept: FAMILY MEDICINE CLINIC | Facility: CLINIC | Age: 79
End: 2024-01-31
Payer: MEDICARE

## 2024-01-31 VITALS
WEIGHT: 203 LBS | OXYGEN SATURATION: 98 % | HEART RATE: 81 BPM | DIASTOLIC BLOOD PRESSURE: 82 MMHG | BODY MASS INDEX: 31.86 KG/M2 | HEIGHT: 67 IN | SYSTOLIC BLOOD PRESSURE: 138 MMHG | TEMPERATURE: 98.1 F

## 2024-01-31 DIAGNOSIS — Z00.00 MEDICARE ANNUAL WELLNESS VISIT, SUBSEQUENT: Primary | ICD-10-CM

## 2024-01-31 PROCEDURE — 3075F SYST BP GE 130 - 139MM HG: CPT | Performed by: NURSE PRACTITIONER

## 2024-01-31 PROCEDURE — 1123F ACP DISCUSS/DSCN MKR DOCD: CPT | Performed by: NURSE PRACTITIONER

## 2024-01-31 PROCEDURE — G0439 PPPS, SUBSEQ VISIT: HCPCS | Performed by: NURSE PRACTITIONER

## 2024-01-31 PROCEDURE — 3079F DIAST BP 80-89 MM HG: CPT | Performed by: NURSE PRACTITIONER

## 2024-01-31 ASSESSMENT — PATIENT HEALTH QUESTIONNAIRE - PHQ9
2. FEELING DOWN, DEPRESSED OR HOPELESS: 0
1. LITTLE INTEREST OR PLEASURE IN DOING THINGS: 0
SUM OF ALL RESPONSES TO PHQ QUESTIONS 1-9: 0
SUM OF ALL RESPONSES TO PHQ QUESTIONS 1-9: 0
SUM OF ALL RESPONSES TO PHQ9 QUESTIONS 1 & 2: 0
SUM OF ALL RESPONSES TO PHQ QUESTIONS 1-9: 0
SUM OF ALL RESPONSES TO PHQ QUESTIONS 1-9: 0

## 2024-01-31 NOTE — PATIENT INSTRUCTIONS
These include candy, desserts, and soda pop.   Lifestyle changes    If your doctor recommends it, get more exercise. Walking is a good choice. Bit by bit, increase the amount you walk every day. Try for at least 30 minutes on most days of the week. You also may want to swim, bike, or do other activities.     Do not smoke. If you need help quitting, talk to your doctor about stop-smoking programs and medicines. These can increase your chances of quitting for good. Quitting smoking may be the most important step you can take to protect your heart. It is never too late to quit.     Limit alcohol to 2 drinks a day for men and 1 drink a day for women. Too much alcohol can cause health problems.     Manage other health problems such as diabetes, high blood pressure, and high cholesterol. If you think you may have a problem with alcohol or drug use, talk to your doctor.   Medicines    Take your medicines exactly as prescribed. Call your doctor if you think you are having a problem with your medicine.     If your doctor recommends aspirin, take the amount directed each day. Make sure you take aspirin and not another kind of pain reliever, such as acetaminophen (Tylenol).   When should you call for help?   Call 911 if you have symptoms of a heart attack. These may include:    Chest pain or pressure, or a strange feeling in the chest.     Sweating.     Shortness of breath.     Pain, pressure, or a strange feeling in the back, neck, jaw, or upper belly or in one or both shoulders or arms.     Lightheadedness or sudden weakness.     A fast or irregular heartbeat.   After you call 911, the  may tell you to chew 1 adult-strength or 2 to 4 low-dose aspirin. Wait for an ambulance. Do not try to drive yourself.  Watch closely for changes in your health, and be sure to contact your doctor if you have any problems.  Where can you learn more?  Go to https://www.healthwise.net/patientEd and enter F075 to learn more about \"A

## 2024-01-31 NOTE — PROGRESS NOTES
Medicare Annual Wellness Visit    Scott Cm is here for Medicare AWV    Assessment & Plan   Medicare annual wellness visit, subsequent  Recommendations for Preventive Services Due: see orders and patient instructions/AVS.  Recommended screening schedule for the next 5-10 years is provided to the patient in written form: see Patient Instructions/AVS.     No follow-ups on file.     Subjective       Patient's complete Health Risk Assessment and screening values have been reviewed and are found in Flowsheets. The following problems were reviewed today and where indicated follow up appointments were made and/or referrals ordered.    Positive Risk Factor Screenings with Interventions:       Cognitive:      Words recalled: 1 Word Recalled     Total Score Interpretation: Abnormal Mini-Cog  Interventions:  See AVS for additional education material          Self-assessment of health:  In general, how would you say your health is?: (!) Poor    Interventions:  See AVS for additional education material     Activity, Diet, and Weight:  On average, how many days per week do you engage in moderate to strenuous exercise (like a brisk walk)?: 0 days  On average, how many minutes do you engage in exercise at this level?: 0 min    Do you eat balanced/healthy meals regularly?: Yes    Body mass index is 31.79 kg/m². (!) Abnormal      Inactivity Interventions:  See AVS for additional education material  Obesity Interventions:  See AVS for additional education material            Dentist Screen:  Have you seen the dentist within the past year?: (!) No    Intervention:  Advised to schedule with their dentist     Vision Screen:  Do you have difficulty driving, watching TV, or doing any of your daily activities because of your eyesight?: No  Have you had an eye exam within the past year?: (!) No  No results found.    Interventions:   Patient encouraged to make appointment with their eye specialist    Safety:  Do you have working smoke  Limited review of system as patient did not provide answer to most questions despite use of .   CONSTITUTIONAL: + subjective fever but cannot say what temperature. + pain all over. + tired   HEENT: No cough.  RESPIRATORY: No shortness of breath  CARDIOVASCULAR: No chest pain  GASTROINTESTINAL: No abdominal pain. No nausea/ vomiting.   GENITOURINARY:+ dysuria   SKIN: + Bruises on back, hips, shoulders   MSK: + severe pain bilateral shoulders, bilateral hips, bilateral wrists (R > L)

## 2024-02-14 PROBLEM — Z12.11 COLON CANCER SCREENING: Status: RESOLVED | Noted: 2024-01-15 | Resolved: 2024-02-14

## 2024-02-21 ENCOUNTER — TELEPHONE (OUTPATIENT)
Dept: FAMILY MEDICINE CLINIC | Facility: CLINIC | Age: 79
End: 2024-02-21

## 2024-02-23 ENCOUNTER — OFFICE VISIT (OUTPATIENT)
Dept: FAMILY MEDICINE CLINIC | Facility: CLINIC | Age: 79
End: 2024-02-23
Payer: MEDICARE

## 2024-02-23 VITALS
TEMPERATURE: 99 F | HEIGHT: 67 IN | DIASTOLIC BLOOD PRESSURE: 84 MMHG | HEART RATE: 65 BPM | SYSTOLIC BLOOD PRESSURE: 133 MMHG | OXYGEN SATURATION: 98 % | WEIGHT: 206.6 LBS | BODY MASS INDEX: 32.43 KG/M2
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
## 2024-02-23 DIAGNOSIS — S20.212S CONTUSION OF LEFT CHEST WALL, SEQUELA: Primary | ICD-10-CM

## 2024-02-23 PROCEDURE — 3079F DIAST BP 80-89 MM HG: CPT | Performed by: STUDENT IN AN ORGANIZED HEALTH CARE EDUCATION/TRAINING PROGRAM

## 2024-02-23 PROCEDURE — 3075F SYST BP GE 130 - 139MM HG: CPT | Performed by: STUDENT IN AN ORGANIZED HEALTH CARE EDUCATION/TRAINING PROGRAM

## 2024-02-23 PROCEDURE — 1123F ACP DISCUSS/DSCN MKR DOCD: CPT | Performed by: STUDENT IN AN ORGANIZED HEALTH CARE EDUCATION/TRAINING PROGRAM

## 2024-02-23 PROCEDURE — 99213 OFFICE O/P EST LOW 20 MIN: CPT | Performed by: STUDENT IN AN ORGANIZED HEALTH CARE EDUCATION/TRAINING PROGRAM

## 2024-02-23 RX ORDER — NAPROXEN 375 MG/1
375 TABLET ORAL EVERY 12 HOURS PRN
COMMUNITY
Start: 2024-02-16

## 2024-02-23 RX ORDER — TIZANIDINE 4 MG/1
4 TABLET ORAL 3 TIMES DAILY PRN
COMMUNITY
Start: 2024-02-16

## 2024-02-23 RX ORDER — HYDROCODONE BITARTRATE AND ACETAMINOPHEN 5; 325 MG/1; MG/1
1 TABLET ORAL EVERY 6 HOURS PRN
COMMUNITY
Start: 2024-02-16

## 2024-02-23 RX ORDER — EPLERENONE 50 MG/1
50 TABLET, FILM COATED ORAL DAILY
Qty: 90 TABLET | Refills: 3 | Status: SHIPPED | OUTPATIENT
Start: 2024-02-23

## 2024-02-23 RX ORDER — LIDOCAINE 50 MG/G
1 PATCH TOPICAL DAILY
COMMUNITY
Start: 2024-02-16 | End: 2024-02-23 | Stop reason: SDUPTHER

## 2024-02-23 RX ORDER — LEVOTHYROXINE SODIUM 112 UG/1
112 TABLET ORAL DAILY
Qty: 90 TABLET | Refills: 3 | Status: SHIPPED | OUTPATIENT
Start: 2024-02-23

## 2024-02-23 RX ORDER — LANOLIN ALCOHOL/MO/W.PET/CERES
400 CREAM (GRAM) TOPICAL DAILY
Qty: 90 TABLET | Refills: 3 | Status: SHIPPED | OUTPATIENT
Start: 2024-02-23

## 2024-02-23 RX ORDER — LIDOCAINE 50 MG/G
1 PATCH TOPICAL DAILY
Qty: 14 PATCH | Refills: 0 | Status: SHIPPED | OUTPATIENT
Start: 2024-02-23

## 2024-02-23 RX ORDER — PSEUDOEPHEDRINE HCL 30 MG
100 TABLET ORAL 2 TIMES DAILY
Qty: 60 CAPSULE | Refills: 5 | Status: SHIPPED | OUTPATIENT
Start: 2024-02-23

## 2024-02-23 NOTE — PROGRESS NOTES
[facility]  [address]  [place] [address]  Phone [phone]  Fax:  [phone]    Scott Cm (:  1945) is a 78 y.o. male here for evaluation of the following chief complaint(s):  Follow-Up from Hospital (Patient had a fall on 24, he tripped over a cord and fell on his side. The left side of his ribs and left wrist and thumb are painful. X rays were negative. He reports his neck a little sore. )       ASSESSMENT/PLAN:  1. Contusion of left chest wall, sequela  2. Fall from ground level    Recent fall from tripping.  Was evaluated in the ED with unremarkable lab workup.  No acute issues on CT cervical spine or CT chest/abdomen/pelvis.  CT head showed no acute intracranial abnormality.  Recommended patient continue lidocaine patches to help control his left rib soreness.  Can take Norco or naproxen as needed.  Return precautions discussed.    Return if symptoms worsen or fail to improve.         Subjective   SUBJECTIVE/OBJECTIVE:  HPI  78-year-old male with PMH of DM2, HTN, CAD, CKD, GERD, prostate cancer s/p prostatectomy, papillary thyroid carcinoma, hypothyroidism, pernicious anemia, and HLD who presents for ED follow-up.  Presented to [facility]  after fall from standing.  Reportedly tripped over a cord chasing after a dog and fell onto his left side.  Noted to have abrasions over the left side of his face and reported pain in his left hand and over left chest.  BMP and CBC unremarkable, troponin negative.  Scans were negative for acute injury.  CT cervical spine showed no acute fractures but did demonstrate an os odontoideum of C2.  Ortho spine was consulted in the ED.  Ortho spine determined no acute intervention was needed, patient elected to forego cervical collar.  Prescribed naproxen, lidocaine patches, and Norco.  -Still has soreness over left lateral ribs and left chest wall but hasn't taken Norco or Naproxen yesterday or today  -Lidocaine patch has

## 2024-05-08 ENCOUNTER — OFFICE VISIT (OUTPATIENT)
Dept: FAMILY MEDICINE CLINIC | Facility: CLINIC | Age: 79
End: 2024-05-08
Payer: MEDICARE

## 2024-05-08 VITALS
SYSTOLIC BLOOD PRESSURE: 138 MMHG | OXYGEN SATURATION: 94 % | BODY MASS INDEX: 32.65 KG/M2 | HEIGHT: 67 IN | TEMPERATURE: 98 F | WEIGHT: 208 LBS | DIASTOLIC BLOOD PRESSURE: 82 MMHG | HEART RATE: 86 BPM

## 2024-05-08 DIAGNOSIS — C61 PROSTATE CANCER (HCC): ICD-10-CM

## 2024-05-08 DIAGNOSIS — E83.42 HYPOMAGNESEMIA: ICD-10-CM

## 2024-05-08 DIAGNOSIS — E78.2 MIXED HYPERLIPIDEMIA: ICD-10-CM

## 2024-05-08 DIAGNOSIS — E89.0 POSTSURGICAL HYPOTHYROIDISM: ICD-10-CM

## 2024-05-08 DIAGNOSIS — I10 ESSENTIAL HYPERTENSION: ICD-10-CM

## 2024-05-08 DIAGNOSIS — D51.0 PERNICIOUS ANEMIA: ICD-10-CM

## 2024-05-08 DIAGNOSIS — N18.9 CHRONIC KIDNEY DISEASE, UNSPECIFIED CKD STAGE: ICD-10-CM

## 2024-05-08 DIAGNOSIS — E11.40 CONTROLLED TYPE 2 DIABETES MELLITUS WITH DIABETIC NEUROPATHY, WITHOUT LONG-TERM CURRENT USE OF INSULIN (HCC): Primary | ICD-10-CM

## 2024-05-08 DIAGNOSIS — C73 MALIGNANT NEOPLASM OF THYROID GLAND (HCC): ICD-10-CM

## 2024-05-08 DIAGNOSIS — E11.40 CONTROLLED TYPE 2 DIABETES MELLITUS WITH DIABETIC NEUROPATHY, WITHOUT LONG-TERM CURRENT USE OF INSULIN (HCC): ICD-10-CM

## 2024-05-08 LAB
ALBUMIN SERPL-MCNC: 3.6 G/DL (ref 3.2–4.6)
ALBUMIN/GLOB SERPL: 1.3 (ref 1–1.9)
ALP SERPL-CCNC: 77 U/L (ref 40–129)
ALT SERPL-CCNC: 13 U/L (ref 12–65)
ANION GAP SERPL CALC-SCNC: 11 MMOL/L (ref 9–18)
AST SERPL-CCNC: 26 U/L (ref 15–37)
BILIRUB SERPL-MCNC: 0.6 MG/DL (ref 0–1.2)
BUN SERPL-MCNC: 21 MG/DL (ref 8–23)
CALCIUM SERPL-MCNC: 8.8 MG/DL (ref 8.8–10.2)
CHLORIDE SERPL-SCNC: 104 MMOL/L (ref 98–107)
CHOLEST SERPL-MCNC: 123 MG/DL (ref 0–200)
CO2 SERPL-SCNC: 26 MMOL/L (ref 20–28)
CREAT SERPL-MCNC: 1.19 MG/DL (ref 0.8–1.3)
EST. AVERAGE GLUCOSE BLD GHB EST-MCNC: 141 MG/DL
GLOBULIN SER CALC-MCNC: 2.7 G/DL (ref 2.3–3.5)
GLUCOSE SERPL-MCNC: 131 MG/DL (ref 70–99)
HBA1C MFR BLD: 6.5 % (ref 0–5.6)
HDLC SERPL-MCNC: 35 MG/DL (ref 40–60)
HDLC SERPL: 3.5 (ref 0–5)
LDLC SERPL CALC-MCNC: 45 MG/DL (ref 0–100)
MAGNESIUM SERPL-MCNC: 1.4 MG/DL (ref 1.8–2.4)
POTASSIUM SERPL-SCNC: 4 MMOL/L (ref 3.5–5.1)
PROT SERPL-MCNC: 6.3 G/DL (ref 6.3–8.2)
SODIUM SERPL-SCNC: 141 MMOL/L (ref 136–145)
TRIGL SERPL-MCNC: 217 MG/DL (ref 0–150)
TSH W FREE THYROID IF ABNORMAL: 2.06 UIU/ML (ref 0.27–4.2)
VIT B12 SERPL-MCNC: 296 PG/ML (ref 193–986)
VLDLC SERPL CALC-MCNC: 43 MG/DL (ref 6–23)

## 2024-05-08 PROCEDURE — 99214 OFFICE O/P EST MOD 30 MIN: CPT | Performed by: STUDENT IN AN ORGANIZED HEALTH CARE EDUCATION/TRAINING PROGRAM

## 2024-05-08 PROCEDURE — 3079F DIAST BP 80-89 MM HG: CPT | Performed by: STUDENT IN AN ORGANIZED HEALTH CARE EDUCATION/TRAINING PROGRAM

## 2024-05-08 PROCEDURE — 3075F SYST BP GE 130 - 139MM HG: CPT | Performed by: STUDENT IN AN ORGANIZED HEALTH CARE EDUCATION/TRAINING PROGRAM

## 2024-05-08 PROCEDURE — 3044F HG A1C LEVEL LT 7.0%: CPT | Performed by: STUDENT IN AN ORGANIZED HEALTH CARE EDUCATION/TRAINING PROGRAM

## 2024-05-08 PROCEDURE — 1123F ACP DISCUSS/DSCN MKR DOCD: CPT | Performed by: STUDENT IN AN ORGANIZED HEALTH CARE EDUCATION/TRAINING PROGRAM

## 2024-05-08 PROCEDURE — G2211 COMPLEX E/M VISIT ADD ON: HCPCS | Performed by: STUDENT IN AN ORGANIZED HEALTH CARE EDUCATION/TRAINING PROGRAM

## 2024-05-08 RX ORDER — PANTOPRAZOLE SODIUM 40 MG/1
40 TABLET, DELAYED RELEASE ORAL
Qty: 90 TABLET | Refills: 3 | Status: SHIPPED | OUTPATIENT
Start: 2024-05-08

## 2024-05-08 RX ORDER — ROSUVASTATIN CALCIUM 20 MG/1
20 TABLET, COATED ORAL NIGHTLY
Qty: 90 TABLET | Refills: 3 | Status: SHIPPED | OUTPATIENT
Start: 2024-05-08

## 2024-05-08 RX ORDER — METHOCARBAMOL 500 MG/1
500 TABLET, FILM COATED ORAL NIGHTLY
Qty: 90 TABLET | Refills: 3 | Status: SHIPPED | OUTPATIENT
Start: 2024-05-08

## 2024-05-08 RX ORDER — TAMSULOSIN HYDROCHLORIDE 0.4 MG/1
0.4 CAPSULE ORAL DAILY
Qty: 90 CAPSULE | Refills: 3 | Status: SHIPPED | OUTPATIENT
Start: 2024-05-08

## 2024-05-08 SDOH — ECONOMIC STABILITY: INCOME INSECURITY: HOW HARD IS IT FOR YOU TO PAY FOR THE VERY BASICS LIKE FOOD, HOUSING, MEDICAL CARE, AND HEATING?: NOT HARD AT ALL

## 2024-05-08 SDOH — ECONOMIC STABILITY: FOOD INSECURITY: WITHIN THE PAST 12 MONTHS, YOU WORRIED THAT YOUR FOOD WOULD RUN OUT BEFORE YOU GOT MONEY TO BUY MORE.: NEVER TRUE

## 2024-05-08 SDOH — ECONOMIC STABILITY: FOOD INSECURITY: WITHIN THE PAST 12 MONTHS, THE FOOD YOU BOUGHT JUST DIDN'T LAST AND YOU DIDN'T HAVE MONEY TO GET MORE.: NEVER TRUE

## 2024-05-08 NOTE — PATIENT INSTRUCTIONS
Call Dr. Peraza's office to get appointment for follow-up of your thyroid cancer.    Marlon Peraza MD, FACE   [facility]   [address], [place] [address]   Phone [phone]

## 2024-05-08 NOTE — PROGRESS NOTES
[facility]  [address] [place] [address]  Phone [phone]  Fax:  [phone]    Scott Cm (:  1945) is a 78 y.o. male here for evaluation of the following chief complaint(s):  Follow-up (No labs/Unsure if needs refills/Did not bring meds in today)    Assessment & Plan   ASSESSMENT/PLAN:  1. Controlled type 2 diabetes mellitus with diabetic neuropathy, without long-term current use of insulin (HCC)  -     Hemoglobin A1C; Future  -     Comprehensive Metabolic Panel; Future  2. Malignant neoplasm of thyroid gland (HCC)  3. Pernicious anemia  -     Vitamin B12; Future  4. Postsurgical hypothyroidism  -     TSH with Reflex; Future  5. Essential hypertension  -     Comprehensive Metabolic Panel; Future  6. Hypomagnesemia  -     Magnesium; Future  7. Mixed hyperlipidemia  -     Lipid Panel; Future  8. Chronic kidney disease, unspecified CKD stage  9. Prostate cancer (HCC)  -     PSA, ultrasensitive; Future    Patient is a poor historian, advised him to bring his medications with him to next appointment.  Check basic labs.  History of pernicious anemia, will check vitamin B12 level.    Blood pressure controlled, continue eplerenone.  Continue Synthroid 112 mcg daily for hypothyroidism.  Continue Protonix for GERD.    History of CAD, advised patient to start aspirin 81 mg daily, continue Crestor 20 mg daily.    Has been followed by urology for history of prostate cancer s/p prostatectomy, PSA 2023 was 0.014.  Advised to get back in with his urologist.  Check PSA.  Has been followed by endocrinology for history of papillary thyroid carcinoma s/p total thyroidectomy .  Advised to get back in with his endocrinologist.  Has been followed by cardiology for CAD.     Colonoscopy 2024 showed mixed tubulovillous adenoma, 1 year repeat recommended.      Return in about 6 months (around 2024) for routine f/u.         Subjective   SUBJECTIVE/OBJECTIVE:  HPI  78-year-old male

## 2024-05-11 LAB — PSA SERPL DL<=0.01 NG/ML-MCNC: <0.006 NG/ML (ref 0–4)

## 2024-08-14 ENCOUNTER — TELEPHONE (OUTPATIENT)
Dept: FAMILY MEDICINE CLINIC | Facility: CLINIC | Age: 79
End: 2024-08-14

## 2024-08-14 NOTE — TELEPHONE ENCOUNTER
----- Message from Marge SANTOS sent at 8/12/2024  3:50 PM EDT -----  Regarding: [facility] Appointment Request  [facility] Appointment Request    Patient needs appointment for [facility] Appointment Type: ED Follow-Up.    Patient Requested Dates(s): for this week if possible tomorrow August 13, 2024  Patient Requested Time: anytime in afternoon   Provider Name:Giuliano Noel MD    Reason for Appointment Request: Established Patient - Available appointments did not meet patient need  --------------------------------------------------------------------------------------------------------------------------    Relationship to Patient: Other Friend      Call Back Information: OK to leave message on voicemail  Preferred Call Back Number: Phone  [phone]       Note: the patient was in the ER last couple of days since he has infection on his lungs and needs an urgent appointment for a follow up. The patient also experiencing cough and cold, congested, coughing a lot of phlegm and has body aches.

## 2024-08-15 ENCOUNTER — OFFICE VISIT (OUTPATIENT)
Dept: FAMILY MEDICINE CLINIC | Facility: CLINIC | Age: 79
End: 2024-08-15

## 2024-08-15 ENCOUNTER — HOSPITAL ENCOUNTER (OUTPATIENT)
Dept: GENERAL RADIOLOGY | Age: 79
Discharge: HOME | End: 2024-08-15
Payer: MEDICARE

## 2024-08-15 VITALS
DIASTOLIC BLOOD PRESSURE: 72 MMHG | HEART RATE: 78 BPM | OXYGEN SATURATION: 97 % | BODY MASS INDEX: 31.23 KG/M2 | HEIGHT: 67 IN | SYSTOLIC BLOOD PRESSURE: 118 MMHG | TEMPERATURE: 98 F | WEIGHT: 199 LBS

## 2024-08-15 DIAGNOSIS — R05.2 SUBACUTE COUGH: ICD-10-CM

## 2024-08-15 DIAGNOSIS — R05.2 SUBACUTE COUGH: Primary | ICD-10-CM

## 2024-08-15 LAB
ANION GAP SERPL CALC-SCNC: 12 MMOL/L (ref 9–18)
BASOPHILS # BLD: 0.1 K/UL (ref 0–0.2)
BASOPHILS NFR BLD: 1 % (ref 0–2)
BUN SERPL-MCNC: 12 MG/DL (ref 8–23)
CALCIUM SERPL-MCNC: 9.2 MG/DL (ref 8.8–10.2)
CHLORIDE SERPL-SCNC: 102 MMOL/L (ref 98–107)
CO2 SERPL-SCNC: 28 MMOL/L (ref 20–28)
CREAT SERPL-MCNC: 1.14 MG/DL (ref 0.8–1.3)
DIFFERENTIAL METHOD BLD: NORMAL
EOSINOPHIL # BLD: 0.3 K/UL (ref 0–0.8)
EOSINOPHIL NFR BLD: 3 % (ref 0.5–7.8)
ERYTHROCYTE [DISTWIDTH] IN BLOOD BY AUTOMATED COUNT: 13.4 % (ref 11.9–14.6)
GLUCOSE SERPL-MCNC: 114 MG/DL (ref 70–99)
HCT VFR BLD AUTO: 44.6 % (ref 41.1–50.3)
HGB BLD-MCNC: 14.3 G/DL (ref 13.6–17.2)
IMM GRANULOCYTES # BLD AUTO: 0 K/UL (ref 0–0.5)
IMM GRANULOCYTES NFR BLD AUTO: 0 % (ref 0–5)
LYMPHOCYTES # BLD: 2.3 K/UL (ref 0.5–4.6)
LYMPHOCYTES NFR BLD: 22 % (ref 13–44)
MCH RBC QN AUTO: 29.9 PG (ref 26.1–32.9)
MCHC RBC AUTO-ENTMCNC: 32.1 G/DL (ref 31.4–35)
MCV RBC AUTO: 93.3 FL (ref 82–102)
MONOCYTES # BLD: 0.6 K/UL (ref 0.1–1.3)
MONOCYTES NFR BLD: 6 % (ref 4–12)
NEUTS SEG # BLD: 6.8 K/UL (ref 1.7–8.2)
NEUTS SEG NFR BLD: 68 % (ref 43–78)
NRBC # BLD: 0 K/UL (ref 0–0.2)
PLATELET # BLD AUTO: 193 K/UL (ref 150–450)
PMV BLD AUTO: 11.5 FL (ref 9.4–12.3)
POTASSIUM SERPL-SCNC: 4.1 MMOL/L (ref 3.5–5.1)
RBC # BLD AUTO: 4.78 M/UL (ref 4.23–5.6)
SODIUM SERPL-SCNC: 141 MMOL/L (ref 136–145)
WBC # BLD AUTO: 10.2 K/UL (ref 4.3–11.1)

## 2024-08-15 PROCEDURE — 71046 X-RAY EXAM CHEST 2 VIEWS: CPT

## 2024-08-15 RX ORDER — ALBUTEROL SULFATE 90 UG/1
2 AEROSOL, METERED RESPIRATORY (INHALATION) 4 TIMES DAILY PRN
Qty: 18 G | Refills: 0 | Status: SHIPPED | OUTPATIENT
Start: 2024-08-15

## 2024-08-15 RX ORDER — CEFDINIR 300 MG/1
300 CAPSULE ORAL 2 TIMES DAILY
Qty: 14 CAPSULE | Refills: 0 | Status: SHIPPED | OUTPATIENT
Start: 2024-08-15 | End: 2024-08-22

## 2024-08-15 RX ORDER — DOXYCYCLINE HYCLATE 100 MG
100 TABLET ORAL 2 TIMES DAILY
Qty: 14 TABLET | Refills: 0 | Status: SHIPPED | OUTPATIENT
Start: 2024-08-15 | End: 2024-08-22

## 2024-08-16 ENCOUNTER — TELEPHONE (OUTPATIENT)
Dept: FAMILY MEDICINE CLINIC | Facility: CLINIC | Age: 79
End: 2024-08-16

## 2024-08-16 NOTE — TELEPHONE ENCOUNTER
Called to check on pt . He is taking ab ,no fever. Unable to get pulse ox last night but will get one today. Advised if worsen to go to ED over week end. Pt VU.   Pt in no distress at time of call.  
no pain in ED well appearing abd is soft jem po  rapid strep neg, ua neg, xray neg  f/u with peds if symptoms cont rec GI f/u

## 2024-08-20 ENCOUNTER — TELEPHONE (OUTPATIENT)
Dept: FAMILY MEDICINE CLINIC | Facility: CLINIC | Age: 79
End: 2024-08-20

## 2024-08-20 NOTE — TELEPHONE ENCOUNTER
Pt caregiver Dayna brought consent forms from [facility] and our last AVS. Explained to her that this would not tell me what he was discharged from hospital on ,as in medications.   Advised her that I would look into and call her back.

## 2024-08-20 NOTE — TELEPHONE ENCOUNTER
Advice  Got out of the hospital yesterday evening and needs to talk to you about his medication phone call routed to Kylie

## 2024-09-30 RX ORDER — PSEUDOEPHEDRINE HCL 30 MG
100 TABLET ORAL 2 TIMES DAILY
Qty: 60 CAPSULE | Refills: 5 | Status: SHIPPED | OUTPATIENT
Start: 2024-09-30

## 2024-09-30 RX ORDER — METHOCARBAMOL 500 MG/1
500 TABLET, FILM COATED ORAL NIGHTLY
Qty: 90 TABLET | Refills: 3 | Status: SHIPPED | OUTPATIENT
Start: 2024-09-30

## 2024-09-30 RX ORDER — EPLERENONE 50 MG/1
50 TABLET, FILM COATED ORAL DAILY
Qty: 90 TABLET | Refills: 3 | Status: SHIPPED | OUTPATIENT
Start: 2024-09-30

## 2024-09-30 RX ORDER — ROSUVASTATIN CALCIUM 20 MG/1
20 TABLET, COATED ORAL NIGHTLY
Qty: 90 TABLET | Refills: 3 | Status: SHIPPED | OUTPATIENT
Start: 2024-09-30

## 2024-09-30 RX ORDER — PANTOPRAZOLE SODIUM 40 MG/1
40 TABLET, DELAYED RELEASE ORAL
Qty: 90 TABLET | Refills: 3 | Status: SHIPPED | OUTPATIENT
Start: 2024-09-30

## 2024-09-30 RX ORDER — TAMSULOSIN HYDROCHLORIDE 0.4 MG/1
0.4 CAPSULE ORAL DAILY
Qty: 90 CAPSULE | Refills: 3 | Status: SHIPPED | OUTPATIENT
Start: 2024-09-30

## 2024-09-30 RX ORDER — LEVOTHYROXINE SODIUM 112 UG/1
112 TABLET ORAL DAILY
Qty: 90 TABLET | Refills: 3 | Status: SHIPPED | OUTPATIENT
Start: 2024-09-30

## 2024-09-30 RX ORDER — LIDOCAINE 50 MG/G
1 PATCH TOPICAL DAILY
Qty: 14 PATCH | Refills: 0 | Status: SHIPPED | OUTPATIENT
Start: 2024-09-30

## 2024-09-30 RX ORDER — LANOLIN ALCOHOL/MO/W.PET/CERES
400 CREAM (GRAM) TOPICAL DAILY
Qty: 90 TABLET | Refills: 3 | Status: SHIPPED | OUTPATIENT
Start: 2024-09-30

## 2024-11-08 ENCOUNTER — OFFICE VISIT (OUTPATIENT)
Dept: FAMILY MEDICINE CLINIC | Facility: CLINIC | Age: 79
End: 2024-11-08

## 2024-11-08 VITALS
TEMPERATURE: 98 F | DIASTOLIC BLOOD PRESSURE: 78 MMHG | SYSTOLIC BLOOD PRESSURE: 128 MMHG | BODY MASS INDEX: 31.23 KG/M2 | OXYGEN SATURATION: 97 % | HEART RATE: 80 BPM | HEIGHT: 67 IN | WEIGHT: 199 LBS

## 2024-11-08 DIAGNOSIS — E89.0 POSTSURGICAL HYPOTHYROIDISM: ICD-10-CM

## 2024-11-08 DIAGNOSIS — I10 ESSENTIAL HYPERTENSION: ICD-10-CM

## 2024-11-08 DIAGNOSIS — E83.42 HYPOMAGNESEMIA: ICD-10-CM

## 2024-11-08 DIAGNOSIS — D51.0 PERNICIOUS ANEMIA: ICD-10-CM

## 2024-11-08 DIAGNOSIS — E11.40 CONTROLLED TYPE 2 DIABETES MELLITUS WITH DIABETIC NEUROPATHY, WITHOUT LONG-TERM CURRENT USE OF INSULIN (HCC): ICD-10-CM

## 2024-11-08 DIAGNOSIS — E53.8 B12 DEFICIENCY: ICD-10-CM

## 2024-11-08 DIAGNOSIS — E78.2 MIXED HYPERLIPIDEMIA: ICD-10-CM

## 2024-11-08 DIAGNOSIS — C61 PROSTATE CANCER (HCC): ICD-10-CM

## 2024-11-08 DIAGNOSIS — M79.605 BILATERAL LEG PAIN: Primary | ICD-10-CM

## 2024-11-08 DIAGNOSIS — M79.604 BILATERAL LEG PAIN: Primary | ICD-10-CM

## 2024-11-08 LAB
ALBUMIN SERPL-MCNC: 3.8 G/DL (ref 3.2–4.6)
ALBUMIN/GLOB SERPL: 1.1 (ref 1–1.9)
ALP SERPL-CCNC: 78 U/L (ref 40–129)
ALT SERPL-CCNC: 14 U/L (ref 8–55)
ANION GAP SERPL CALC-SCNC: 10 MMOL/L (ref 7–16)
AST SERPL-CCNC: 28 U/L (ref 15–37)
BASOPHILS # BLD: 0.1 K/UL (ref 0–0.2)
BASOPHILS NFR BLD: 2 % (ref 0–2)
BILIRUB SERPL-MCNC: 0.5 MG/DL (ref 0–1.2)
BUN SERPL-MCNC: 19 MG/DL (ref 8–23)
CALCIUM SERPL-MCNC: 9.2 MG/DL (ref 8.8–10.2)
CHLORIDE SERPL-SCNC: 103 MMOL/L (ref 98–107)
CHOLEST SERPL-MCNC: 128 MG/DL (ref 0–200)
CO2 SERPL-SCNC: 25 MMOL/L (ref 20–29)
CREAT SERPL-MCNC: 1.38 MG/DL (ref 0.8–1.3)
DIFFERENTIAL METHOD BLD: NORMAL
EOSINOPHIL # BLD: 0.2 K/UL (ref 0–0.8)
EOSINOPHIL NFR BLD: 3 % (ref 0.5–7.8)
ERYTHROCYTE [DISTWIDTH] IN BLOOD BY AUTOMATED COUNT: 13.8 % (ref 11.9–14.6)
EST. AVERAGE GLUCOSE BLD GHB EST-MCNC: 144 MG/DL
GLOBULIN SER CALC-MCNC: 3.6 G/DL (ref 2.3–3.5)
GLUCOSE SERPL-MCNC: 133 MG/DL (ref 70–99)
HBA1C MFR BLD: 6.6 % (ref 0–5.6)
HCT VFR BLD AUTO: 45.5 % (ref 41.1–50.3)
HDLC SERPL-MCNC: 42 MG/DL (ref 40–60)
HDLC SERPL: 3 (ref 0–5)
HGB BLD-MCNC: 14.4 G/DL (ref 13.6–17.2)
IMM GRANULOCYTES # BLD AUTO: 0 K/UL (ref 0–0.5)
IMM GRANULOCYTES NFR BLD AUTO: 0 % (ref 0–5)
LDLC SERPL CALC-MCNC: 53 MG/DL (ref 0–100)
LYMPHOCYTES # BLD: 2.3 K/UL (ref 0.5–4.6)
LYMPHOCYTES NFR BLD: 31 % (ref 13–44)
MAGNESIUM SERPL-MCNC: 1.8 MG/DL (ref 1.8–2.4)
MCH RBC QN AUTO: 29.1 PG (ref 26.1–32.9)
MCHC RBC AUTO-ENTMCNC: 31.6 G/DL (ref 31.4–35)
MCV RBC AUTO: 92.1 FL (ref 82–102)
MONOCYTES # BLD: 0.6 K/UL (ref 0.1–1.3)
MONOCYTES NFR BLD: 8 % (ref 4–12)
NEUTS SEG # BLD: 4.1 K/UL (ref 1.7–8.2)
NEUTS SEG NFR BLD: 56 % (ref 43–78)
NRBC # BLD: 0 K/UL (ref 0–0.2)
PLATELET # BLD AUTO: 190 K/UL (ref 150–450)
PMV BLD AUTO: 12.2 FL (ref 9.4–12.3)
POTASSIUM SERPL-SCNC: 4.5 MMOL/L (ref 3.5–5.1)
PROT SERPL-MCNC: 7.4 G/DL (ref 6.3–8.2)
RBC # BLD AUTO: 4.94 M/UL (ref 4.23–5.6)
SODIUM SERPL-SCNC: 139 MMOL/L (ref 136–145)
TRIGL SERPL-MCNC: 165 MG/DL (ref 0–150)
TSH W FREE THYROID IF ABNORMAL: 0.85 UIU/ML (ref 0.27–4.2)
VIT B12 SERPL-MCNC: 313 PG/ML (ref 193–986)
VLDLC SERPL CALC-MCNC: 33 MG/DL (ref 6–23)
WBC # BLD AUTO: 7.4 K/UL (ref 4.3–11.1)

## 2024-11-08 RX ORDER — LEVOTHYROXINE SODIUM 112 UG/1
112 TABLET ORAL DAILY
Qty: 90 TABLET | Refills: 3 | Status: SHIPPED | OUTPATIENT
Start: 2024-11-08

## 2024-11-08 RX ORDER — ASPIRIN 81 MG/1
81 TABLET ORAL DAILY
Qty: 90 TABLET | Refills: 3 | Status: SHIPPED | OUTPATIENT
Start: 2024-11-08

## 2024-11-08 RX ORDER — CYANOCOBALAMIN 1000 UG/ML
1000 INJECTION, SOLUTION INTRAMUSCULAR; SUBCUTANEOUS ONCE
Status: COMPLETED | OUTPATIENT
Start: 2024-11-08 | End: 2024-11-08

## 2024-11-08 RX ADMIN — CYANOCOBALAMIN 1000 MCG: 1000 INJECTION, SOLUTION INTRAMUSCULAR; SUBCUTANEOUS at 15:03

## 2024-11-08 NOTE — PATIENT INSTRUCTIONS
Bring all medications with you to next office visit.  Cardiology office should call you to schedule test for your legs.

## 2024-11-08 NOTE — PROGRESS NOTES
[facility]  [address] [place] [address]  Phone [phone]  Fax:  [phone]    Scott Cm (:  1945) is a 78 y.o. male here for evaluation of the following chief complaint(s):  Follow-up (No labs/Did not bring meds/Med confusion ? Pharmacy or pt)    Assessment & Plan   ASSESSMENT/PLAN:  1. Bilateral leg pain  -     Ambulatory Referral to Cardiac Testing  - [facility]  2. Controlled type 2 diabetes mellitus with diabetic neuropathy, without long-term current use of insulin (HCC)  -     Hemoglobin A1C; Future  -     Comprehensive Metabolic Panel; Future  -     CBC with Auto Differential; Future  3. Pernicious anemia  -     Vitamin B12; Future  4. Postsurgical hypothyroidism  -     TSH with Reflex; Future  5. Essential hypertension  -     Comprehensive Metabolic Panel; Future  -     CBC with Auto Differential; Future  6. Hypomagnesemia  -     Magnesium; Future  7. Mixed hyperlipidemia  -     Lipid Panel; Future  8. Prostate cancer (HCC)  9. B12 deficiency  -     Vitamin B12; Future  -     cyanocobalamin injection 1,000 mcg; 1,000 mcg, IntraMUSCular, ONCE, 1 dose, On 24 at 1530    Reports leg muscle pain with walking that improves after he lays down for a while.  Check ABIs.    History of pernicious anemia, will check vitamin B12 level.  Vitamin B12 injection given today.     Check labs today.  Diabetes has been diet controlled.  Blood pressure controlled, continue eplerenone.  Continue Synthroid 112 mcg daily for hypothyroidism.  Continue Protonix for GERD.     History of CAD, continue aspirin 81 mg daily and Crestor 20 mg daily.     Has been followed by urology for history of prostate cancer s/p prostatectomy, negative PSA 2024.  Has been advised to get back in with his urologist.  Has been followed by endocrinology for history of papillary thyroid carcinoma s/p total thyroidectomy .  Has been advised to get back in with his endocrinologist.  Has been

## 2024-12-30 ENCOUNTER — TELEPHONE (OUTPATIENT)
Dept: FAMILY MEDICINE CLINIC | Facility: CLINIC | Age: 79
End: 2024-12-30

## 2024-12-31 ENCOUNTER — TELEPHONE (OUTPATIENT)
Dept: FAMILY MEDICINE CLINIC | Facility: CLINIC | Age: 79
End: 2024-12-31

## 2025-01-24 ENCOUNTER — TELEPHONE (OUTPATIENT)
Dept: FAMILY MEDICINE CLINIC | Facility: CLINIC | Age: 80
End: 2025-01-24

## 2025-01-27 ENCOUNTER — NURSE ONLY (OUTPATIENT)
Dept: FAMILY MEDICINE CLINIC | Facility: CLINIC | Age: 80
End: 2025-01-27

## 2025-01-27 RX ORDER — LEVOTHYROXINE SODIUM 112 UG/1
112 TABLET ORAL DAILY
Qty: 90 TABLET | Refills: 3 | Status: SHIPPED | OUTPATIENT
Start: 2025-01-27

## 2025-01-27 SDOH — ECONOMIC STABILITY: FOOD INSECURITY: WITHIN THE PAST 12 MONTHS, THE FOOD YOU BOUGHT JUST DIDN'T LAST AND YOU DIDN'T HAVE MONEY TO GET MORE.: NEVER TRUE

## 2025-01-27 SDOH — ECONOMIC STABILITY: FOOD INSECURITY: WITHIN THE PAST 12 MONTHS, YOU WORRIED THAT YOUR FOOD WOULD RUN OUT BEFORE YOU GOT MONEY TO BUY MORE.: NEVER TRUE

## 2025-01-27 ASSESSMENT — PATIENT HEALTH QUESTIONNAIRE - PHQ9
SUM OF ALL RESPONSES TO PHQ QUESTIONS 1-9: 0
SUM OF ALL RESPONSES TO PHQ QUESTIONS 1-9: 0
SUM OF ALL RESPONSES TO PHQ9 QUESTIONS 1 & 2: 0
1. LITTLE INTEREST OR PLEASURE IN DOING THINGS: NOT AT ALL
2. FEELING DOWN, DEPRESSED OR HOPELESS: NOT AT ALL
SUM OF ALL RESPONSES TO PHQ QUESTIONS 1-9: 0
SUM OF ALL RESPONSES TO PHQ QUESTIONS 1-9: 0

## 2025-05-09 ENCOUNTER — OFFICE VISIT (OUTPATIENT)
Dept: FAMILY MEDICINE CLINIC | Facility: CLINIC | Age: 80
End: 2025-05-09

## 2025-05-09 VITALS
WEIGHT: 201 LBS | OXYGEN SATURATION: 97 % | TEMPERATURE: 97 F | SYSTOLIC BLOOD PRESSURE: 128 MMHG | BODY MASS INDEX: 31.55 KG/M2 | DIASTOLIC BLOOD PRESSURE: 82 MMHG | HEART RATE: 98 BPM | HEIGHT: 67 IN

## 2025-05-09 DIAGNOSIS — E11.40 CONTROLLED TYPE 2 DIABETES MELLITUS WITH DIABETIC NEUROPATHY, WITHOUT LONG-TERM CURRENT USE OF INSULIN (HCC): ICD-10-CM

## 2025-05-09 DIAGNOSIS — E78.2 MIXED HYPERLIPIDEMIA: ICD-10-CM

## 2025-05-09 DIAGNOSIS — M79.661 PAIN OF RIGHT LOWER LEG: ICD-10-CM

## 2025-05-09 DIAGNOSIS — E89.0 POSTSURGICAL HYPOTHYROIDISM: ICD-10-CM

## 2025-05-09 DIAGNOSIS — L98.9 SKIN LESION OF FACE: ICD-10-CM

## 2025-05-09 DIAGNOSIS — D51.0 PERNICIOUS ANEMIA: ICD-10-CM

## 2025-05-09 DIAGNOSIS — Z00.00 MEDICARE ANNUAL WELLNESS VISIT, SUBSEQUENT: Primary | ICD-10-CM

## 2025-05-09 DIAGNOSIS — M79.605 BILATERAL LEG PAIN: ICD-10-CM

## 2025-05-09 DIAGNOSIS — E83.42 HYPOMAGNESEMIA: ICD-10-CM

## 2025-05-09 DIAGNOSIS — C61 PROSTATE CANCER (HCC): ICD-10-CM

## 2025-05-09 DIAGNOSIS — M79.604 BILATERAL LEG PAIN: ICD-10-CM

## 2025-05-09 DIAGNOSIS — I10 ESSENTIAL HYPERTENSION: ICD-10-CM

## 2025-05-09 LAB
ALBUMIN SERPL-MCNC: 4 G/DL (ref 3.2–4.6)
ALBUMIN/GLOB SERPL: 1.2 (ref 1–1.9)
ALP SERPL-CCNC: 77 U/L (ref 40–129)
ALT SERPL-CCNC: 25 U/L (ref 8–55)
ANION GAP SERPL CALC-SCNC: 14 MMOL/L (ref 7–16)
AST SERPL-CCNC: 41 U/L (ref 15–37)
BASOPHILS # BLD: 0.1 K/UL (ref 0–0.2)
BASOPHILS NFR BLD: 1.2 % (ref 0–2)
BILIRUB SERPL-MCNC: 1 MG/DL (ref 0–1.2)
BUN SERPL-MCNC: 16 MG/DL (ref 8–23)
CALCIUM SERPL-MCNC: 9.4 MG/DL (ref 8.8–10.2)
CHLORIDE SERPL-SCNC: 104 MMOL/L (ref 98–107)
CHOLEST SERPL-MCNC: 145 MG/DL (ref 0–200)
CO2 SERPL-SCNC: 24 MMOL/L (ref 20–29)
CREAT SERPL-MCNC: 1.32 MG/DL (ref 0.8–1.3)
CREAT UR-MCNC: 428 MG/DL (ref 39–259)
DIFFERENTIAL METHOD BLD: ABNORMAL
EOSINOPHIL # BLD: 0.22 K/UL (ref 0–0.8)
EOSINOPHIL NFR BLD: 2.7 % (ref 0.5–7.8)
ERYTHROCYTE [DISTWIDTH] IN BLOOD BY AUTOMATED COUNT: 14 % (ref 11.9–14.6)
EST. AVERAGE GLUCOSE BLD GHB EST-MCNC: 143 MG/DL
GLOBULIN SER CALC-MCNC: 3.2 G/DL (ref 2.3–3.5)
GLUCOSE SERPL-MCNC: 119 MG/DL (ref 70–99)
HBA1C MFR BLD: 6.6 % (ref 0–5.6)
HCT VFR BLD AUTO: 46.5 % (ref 41.1–50.3)
HDLC SERPL-MCNC: 46 MG/DL (ref 40–60)
HDLC SERPL: 3.2 (ref 0–5)
HGB BLD-MCNC: 15 G/DL (ref 13.6–17.2)
IMM GRANULOCYTES # BLD AUTO: 0.03 K/UL (ref 0–0.5)
IMM GRANULOCYTES NFR BLD AUTO: 0.4 % (ref 0–5)
LDLC SERPL CALC-MCNC: 65 MG/DL (ref 0–100)
LYMPHOCYTES # BLD: 2.09 K/UL (ref 0.5–4.6)
LYMPHOCYTES NFR BLD: 25.8 % (ref 13–44)
MAGNESIUM SERPL-MCNC: 1.6 MG/DL (ref 1.8–2.4)
MCH RBC QN AUTO: 30.1 PG (ref 26.1–32.9)
MCHC RBC AUTO-ENTMCNC: 32.3 G/DL (ref 31.4–35)
MCV RBC AUTO: 93.4 FL (ref 82–102)
MICROALBUMIN UR-MCNC: 2.82 MG/DL (ref 0–20)
MICROALBUMIN/CREAT UR-RTO: 7 MG/G (ref 0–30)
MONOCYTES # BLD: 0.6 K/UL (ref 0.1–1.3)
MONOCYTES NFR BLD: 7.4 % (ref 4–12)
NEUTS SEG # BLD: 5.07 K/UL (ref 1.7–8.2)
NEUTS SEG NFR BLD: 62.5 % (ref 43–78)
NRBC # BLD: 0 K/UL (ref 0–0.2)
PLATELET # BLD AUTO: 166 K/UL (ref 150–450)
PMV BLD AUTO: 12.4 FL (ref 9.4–12.3)
POTASSIUM SERPL-SCNC: 4.3 MMOL/L (ref 3.5–5.1)
PROT SERPL-MCNC: 7.2 G/DL (ref 6.3–8.2)
RBC # BLD AUTO: 4.98 M/UL (ref 4.23–5.6)
SODIUM SERPL-SCNC: 142 MMOL/L (ref 136–145)
TRIGL SERPL-MCNC: 170 MG/DL (ref 0–150)
TSH W FREE THYROID IF ABNORMAL: 2.67 UIU/ML (ref 0.27–4.2)
VIT B12 SERPL-MCNC: 249 PG/ML (ref 193–986)
VLDLC SERPL CALC-MCNC: 34 MG/DL (ref 6–23)
WBC # BLD AUTO: 8.1 K/UL (ref 4.3–11.1)

## 2025-05-09 RX ORDER — TAMSULOSIN HYDROCHLORIDE 0.4 MG/1
0.4 CAPSULE ORAL DAILY
Qty: 90 CAPSULE | Refills: 3 | Status: SHIPPED | OUTPATIENT
Start: 2025-05-09

## 2025-05-09 RX ORDER — METHOCARBAMOL 500 MG/1
500 TABLET, FILM COATED ORAL NIGHTLY
Qty: 90 TABLET | Refills: 3 | Status: SHIPPED | OUTPATIENT
Start: 2025-05-09

## 2025-05-09 RX ORDER — ALBUTEROL SULFATE 90 UG/1
2 INHALANT RESPIRATORY (INHALATION) 4 TIMES DAILY PRN
Qty: 18 G | Refills: 5 | Status: SHIPPED | OUTPATIENT
Start: 2025-05-09

## 2025-05-09 RX ORDER — PSEUDOEPHEDRINE HCL 30 MG
100 TABLET ORAL 2 TIMES DAILY
Qty: 60 CAPSULE | Refills: 5 | Status: SHIPPED | OUTPATIENT
Start: 2025-05-09

## 2025-05-09 RX ORDER — ASPIRIN 81 MG/1
81 TABLET ORAL DAILY
Qty: 90 TABLET | Refills: 3 | Status: SHIPPED | OUTPATIENT
Start: 2025-05-09

## 2025-05-09 RX ORDER — LANOLIN ALCOHOL/MO/W.PET/CERES
400 CREAM (GRAM) TOPICAL DAILY
Qty: 90 TABLET | Refills: 3 | Status: SHIPPED | OUTPATIENT
Start: 2025-05-09

## 2025-05-09 RX ORDER — ACETAMINOPHEN 500 MG
500 TABLET ORAL 3 TIMES DAILY PRN
Qty: 90 TABLET | Refills: 5 | Status: SHIPPED | OUTPATIENT
Start: 2025-05-09

## 2025-05-09 RX ORDER — ROSUVASTATIN CALCIUM 20 MG/1
20 TABLET, COATED ORAL NIGHTLY
Qty: 90 TABLET | Refills: 3 | Status: SHIPPED | OUTPATIENT
Start: 2025-05-09

## 2025-05-09 RX ORDER — LEVOTHYROXINE SODIUM 112 UG/1
112 TABLET ORAL DAILY
Qty: 90 TABLET | Refills: 3 | Status: SHIPPED | OUTPATIENT
Start: 2025-05-09

## 2025-05-09 RX ORDER — PANTOPRAZOLE SODIUM 40 MG/1
40 TABLET, DELAYED RELEASE ORAL
Qty: 90 TABLET | Refills: 3 | Status: SHIPPED | OUTPATIENT
Start: 2025-05-09

## 2025-05-09 RX ORDER — EPLERENONE 50 MG/1
50 TABLET ORAL DAILY
Qty: 90 TABLET | Refills: 3 | Status: SHIPPED | OUTPATIENT
Start: 2025-05-09

## 2025-05-09 RX ORDER — ONDANSETRON 4 MG/1
4 TABLET, FILM COATED ORAL EVERY 8 HOURS PRN
COMMUNITY

## 2025-05-09 ASSESSMENT — PATIENT HEALTH QUESTIONNAIRE - PHQ9
1. LITTLE INTEREST OR PLEASURE IN DOING THINGS: NOT AT ALL
2. FEELING DOWN, DEPRESSED OR HOPELESS: NOT AT ALL
SUM OF ALL RESPONSES TO PHQ QUESTIONS 1-9: 0

## 2025-05-09 NOTE — PATIENT INSTRUCTIONS
proteins include seafood, lean meats and poultry, eggs, beans, peas, nuts, seeds, and soy products.     Limit drinks and foods with added sugar. These include candy, desserts, and soda pop.   Heart-healthy lifestyle    If your doctor recommends it, get more exercise. For many people, walking is a good choice. Or you may want to swim, bike, or do other activities. Bit by bit, increase the time you're active every day. Try for at least 30 minutes on most days of the week.     Try to quit or cut back on using tobacco and other nicotine products. This includes smoking and vaping. If you need help quitting, talk to your doctor about stop-smoking programs and medicines. These can increase your chances of quitting for good. Quitting is one of the most important things you can do to protect your heart. It is never too late to quit. Try to avoid secondhand smoke too.     Stay at a weight that's healthy for you. Talk to your doctor if you need help losing weight.     Try to get 7 to 9 hours of sleep each night.     Limit alcohol to 2 drinks a day for men and 1 drink a day for women. Too much alcohol can cause health problems.     Manage other health problems such as diabetes, high blood pressure, and high cholesterol. If you think you may have a problem with alcohol or drug use, talk to your doctor.   Medicines    Take your medicines exactly as prescribed. Call your doctor if you think you are having a problem with your medicine.     If your doctor recommends aspirin, take the amount directed each day. Make sure you take aspirin and not another kind of pain reliever, such as acetaminophen (Tylenol).   When should you call for help?   Call 911 if you have symptoms of a heart attack. These may include:    Chest pain or pressure, or a strange feeling in the chest.     Sweating.     Shortness of breath.     Pain, pressure, or a strange feeling in the back, neck, jaw, or upper belly or in one or both shoulders or arms.

## 2025-05-09 NOTE — PROGRESS NOTES
[facility]  [address] [place] [address]  Phone [phone]  Fax:  [phone]    Scott Cm (:  1945) is a 79 y.o. male here for evaluation of the following chief complaint(s):  Medicare AWV, Benign Prostatic Hypertrophy, Cholesterol Problem (Last labs 2024/refills), Gastroesophageal Reflux (Earth 2024 /Needs repeat ), and Hypothyroidism (No labs refills/)    Assessment & Plan   ASSESSMENT/PLAN:  1. Medicare annual wellness visit, subsequent  2. Postsurgical hypothyroidism  -     TSH reflex to FT4; Future  3. Pernicious anemia  -     CBC with Auto Differential; Future  -     Vitamin B12; Future  4. Bilateral leg pain  -     [facility] - Physical Therapy, [facility]  5. Essential hypertension  6. Hypomagnesemia  -     Magnesium; Future  7. Mixed hyperlipidemia  -     Lipid Panel; Future  8. Prostate cancer (HCC)  -     PSA, ultrasensitive; Future  9. Pain of right lower leg  -     XR TIBIA FIBULA RIGHT (2 VIEWS); Future  10. Controlled type 2 diabetes mellitus with diabetic neuropathy, without long-term current use of insulin ([facility])  -     Hemoglobin A1C; Future  -     Comprehensive Metabolic Panel; Future  -     Albumin/Creatinine Ratio, Urine; Future  11. Skin lesion of face  -     [facility]    Reports muscle cramping in both legs over the past several months.  ABIs 2024 were normal.  Check CMP, magnesium, and CBC.  Recommended patient continue daily magnesium.  He is also having right lateral lower leg pain/tenderness for which Tylenol helps.  Check right tib-fib x-ray.  Recommended patient take Tylenol scheduled.  Will refer patient to physical therapy for his leg pain.    Skin lesion over left temple, will refer to dermatology for further evaluation.     History of pernicious anemia, will check vitamin B12 level.      Check labs today.  Diabetes has been diet controlled.  Blood pressure controlled, continue eplerenone.  Continue

## 2025-05-11 LAB — PSA SERPL DL<=0.01 NG/ML-MCNC: <0.006 NG/ML (ref 0–4)

## 2025-05-12 DIAGNOSIS — E83.42 HYPOMAGNESEMIA: Primary | ICD-10-CM

## 2025-05-12 RX ORDER — LANOLIN ALCOHOL/MO/W.PET/CERES
400 CREAM (GRAM) TOPICAL 2 TIMES DAILY
Qty: 180 TABLET | Refills: 3 | Status: SHIPPED | OUTPATIENT
Start: 2025-05-12

## 2025-06-18 ENCOUNTER — APPOINTMENT (OUTPATIENT)
Dept: URBAN - METROPOLITAN AREA CLINIC 23 | Facility: CLINIC | Age: 80
Setting detail: DERMATOLOGY
End: 2025-06-18

## 2025-06-18 DIAGNOSIS — D485 NEOPLASM OF UNCERTAIN BEHAVIOR OF SKIN: ICD-10-CM

## 2025-06-18 PROBLEM — D48.5 NEOPLASM OF UNCERTAIN BEHAVIOR OF SKIN: Status: ACTIVE | Noted: 2025-06-18

## 2025-06-18 PROCEDURE — ? BIOPSY BY SHAVE METHOD

## 2025-06-18 PROCEDURE — ? PHOTO-DOCUMENTATION

## 2025-06-18 PROCEDURE — ? REFERRAL CORRESPONDENCE

## 2025-06-18 ASSESSMENT — LOCATION SIMPLE DESCRIPTION DERM
LOCATION SIMPLE: LEFT TEMPLE
LOCATION SIMPLE: NOSE

## 2025-06-18 ASSESSMENT — LOCATION DETAILED DESCRIPTION DERM
LOCATION DETAILED: LEFT NASAL DORSUM
LOCATION DETAILED: LEFT LATERAL TEMPLE

## 2025-06-18 ASSESSMENT — LOCATION ZONE DERM
LOCATION ZONE: FACE
LOCATION ZONE: NOSE

## 2025-06-18 NOTE — PROCEDURE: BIOPSY BY SHAVE METHOD
Detail Level: Detailed
Depth Of Biopsy: dermis
Was A Bandage Applied: Yes
Size Of Lesion In Cm: 0
Additional Anticipated Plans: If malignant consider Mohs surgery
Biopsy Type: H and E
Biopsy Method: Dermablade
Anesthesia Type: 1% lidocaine with epinephrine
Anesthesia Volume In Cc: 0.5
Hemostasis: Drysol
Wound Care: Petrolatum
Dressing: bandage
Destruction After The Procedure: No
Type Of Destruction Used: Curettage
Curettage Text: The wound bed was treated with curettage after the biopsy was performed.
Cryotherapy Text: The wound bed was treated with cryotherapy after the biopsy was performed.
Electrodesiccation Text: The wound bed was treated with electrodesiccation after the biopsy was performed.
Electrodesiccation And Curettage Text: The wound bed was treated with electrodesiccation and curettage after the biopsy was performed.
Silver Nitrate Text: The wound bed was treated with silver nitrate after the biopsy was performed.
Lab: [id number]
Lab Facility: [id number]
Medical Necessity Information: It is in your best interest to select a reason for this procedure from the list below. All of these items fulfill various CMS LCD requirements except the new and changing color options.
Consent: Written consent was obtained and risks were reviewed including but not limited to scarring, infection, bleeding, scabbing, incomplete removal, nerve damage and allergy to anesthesia.
Post-Care Instructions: I reviewed with the patient in detail post-care instructions. Patient is to keep the biopsy site dry overnight, and then apply bacitracin twice daily until healed. Patient may apply hydrogen peroxide soaks to remove any crusting.
Notification Instructions: Patient will be notified of biopsy results. However, patient instructed to call the office if not contacted within 2 weeks.
Billing Type: Third-Party Bill
Information: Selecting Yes will display possible errors in your note based on the variables you have selected. This validation is only offered as a suggestion for you. PLEASE NOTE THAT THE VALIDATION TEXT WILL BE REMOVED WHEN YOU FINALIZE YOUR NOTE. IF YOU WANT TO FAX A PRELIMINARY NOTE YOU WILL NEED TO TOGGLE THIS TO 'NO' IF YOU DO NOT WANT IT IN YOUR FAXED NOTE.

## (undated) DEVICE — NDL PRT INJ NSAF BLNT 18GX1.5 --

## (undated) DEVICE — GAUZE,SPONGE,4"X4",12PLY,WOVEN,NS,LF: Brand: MEDLINE

## (undated) DEVICE — BUTTON SWITCH PENCIL BLADE ELECTRODE, HOLSTER: Brand: EDGE

## (undated) DEVICE — YANKAUER,BULB TIP,W/O VENT,RIGID,STERILE: Brand: MEDLINE

## (undated) DEVICE — NEEDLE SYR 18GA L1.5IN RED PLAS HUB S STL BLNT FILL W/O

## (undated) DEVICE — KIT COLON WITH 1.1 OZ ORCA HYDRA SEAL 2 GOWN ADAPT SECONDARY

## (undated) DEVICE — REM POLYHESIVE ADULT PATIENT RETURN ELECTRODE: Brand: VALLEYLAB

## (undated) DEVICE — Device: Brand: SPOT EX ENDOSCOPIC TATTOO

## (undated) DEVICE — INSULATED BLADE ELECTRODE: Brand: EDGE

## (undated) DEVICE — BLOCK BITE 60FR W/ DENT RIM SCRIP ONLY

## (undated) DEVICE — CONTAINER,SPECIMEN,O.R.STRL,4.5OZ: Brand: MEDLINE

## (undated) DEVICE — SINGLE-USE POLYPECTOMY SNARE: Brand: CAPTIVATOR

## (undated) DEVICE — SINGLE PORT MANIFOLD: Brand: NEPTUNE 2

## (undated) DEVICE — SUTURE PERMAHAND SZ 2-0 L18IN NONABSORBABLE BLK L26MM PS 1588H

## (undated) DEVICE — CLIP HEMO ENDOSCP 235CM BX/10 -- RESOLUTION 360

## (undated) DEVICE — CONTAINER PREFIL FRMLN 40ML --

## (undated) DEVICE — GEL SUBMUCOSAL LIFT AGNT -- ORISE

## (undated) DEVICE — SYRINGE, LUER SLIP, STERILE, 60ML: Brand: MEDLINE

## (undated) DEVICE — CANNULA NSL ORAL AD FOR CAPNOFLEX CO2 O2 AIRLFE

## (undated) DEVICE — Device

## (undated) DEVICE — FCPS BX HOT RJ4 2.2MMX240CM -- RADIAL JAW 4 BX/40

## (undated) DEVICE — VALVE SUCTION AIR H2O SET ORCA POD + DISP

## (undated) DEVICE — HEAD AND NECK: Brand: MEDLINE INDUSTRIES, INC.

## (undated) DEVICE — BIPOLAR FORCEPS CORD: Brand: VALLEYLAB

## (undated) DEVICE — CONNECTOR TBNG OD5-7MM O2 END DISP

## (undated) DEVICE — SNARE VASC L240CM LOOP W10MM SHTH DIA2.4MM RND STIFF CLD

## (undated) DEVICE — DRAPE TWL SURG 16X26IN BLU ORB04] ALLCARE INC]

## (undated) DEVICE — FORCEPS BX L240CM JAW DIA2.8MM L CAP W/ NDL MIC MESH TOOTH

## (undated) DEVICE — 2000CC GUARDIAN II: Brand: GUARDIAN

## (undated) DEVICE — X-RAY SPONGES,12 PLY: Brand: DERMACEA

## (undated) DEVICE — BW-412T DISP COMBO CLEANING BRUSH: Brand: SINGLE USE COMBINATION CLEANING BRUSH

## (undated) DEVICE — KENDALL RADIOLUCENT FOAM MONITORING ELECTRODE RECTANGULAR SHAPE: Brand: KENDALL

## (undated) DEVICE — SNARE ENDOSCP POLYP MED STD AD 2.4X27X240 CM 2.8 MM OVL SENS

## (undated) DEVICE — CANNULA NSL AD CO2 SAMP FOR DASH 3000 4000 MON LO FLO

## (undated) DEVICE — Device: Brand: DISPOSABLE ELECTROSURGICAL SNARE

## (undated) DEVICE — TUBING, SUCTION, 3/16" X 6', STRAIGHT: Brand: MEDLINE

## (undated) DEVICE — PROBE 8225101 5PK STD PRASS FL TIP ROHS

## (undated) DEVICE — SYR 5ML 1/5 GRAD LL NSAF LF --

## (undated) DEVICE — SYRINGE MED 10ML LUERLOCK TIP W/O SFTY DISP

## (undated) DEVICE — SYRINGE MEDICAL 3ML CLEAR PLASTIC STANDARD NON CONTROL LUERLOCK TIP DISPOSABLE

## (undated) DEVICE — TRAP SPEC COLL POLYP POLYSTYR --

## (undated) DEVICE — KENDALL SCD EXPRESS SLEEVES, KNEE LENGTH, MEDIUM: Brand: KENDALL SCD

## (undated) DEVICE — CONTAINER FORMALIN PREFILLED 10% NBF 60ML

## (undated) DEVICE — AIR/WATER CLEANING ADAPTER FOR OLYMPUS® GI ENDOSCOPE: Brand: BULLDOG®

## (undated) DEVICE — APPLIER CLP L9.375IN APER 2.1MM CLS L3.8MM 20 SM TI CLP

## (undated) DEVICE — GUIDEWIRE WITH SPRING TIP - SINGLE USE: Brand: SAFEGUIDE®

## (undated) DEVICE — NEEDLE INJ 25GA P5MM SHFT L230CM SHTH DIA2.5MM S STL TEF

## (undated) DEVICE — SPONGE: SPECIALTY PEANUT XR 100/CS: Brand: MEDICAL ACTION INDUSTRIES

## (undated) DEVICE — ELECTRODE PT RET AD L9FT HI MOIST COND ADH HYDRGEL CORDED

## (undated) DEVICE — MAGNETIC DRAPE: Brand: DEVON

## (undated) DEVICE — LUBE JELLY FOIL PACK 1.4 OZ: Brand: MEDLINE INDUSTRIES, INC.

## (undated) DEVICE — TRAP SPEC POLYP REM STRNR CLN DSGN MAGNIFYING WIND DISP

## (undated) DEVICE — SPONGE DISSECT PNUT SM 3/8IN -- 5/PK

## (undated) DEVICE — SUTURE MCRYL SZ 5-0 L18IN ABSRB UD L13MM P-3 3/8 CIR PRIM Y493G

## (undated) DEVICE — CLIPPING DEVICE: Brand: RESOLUTION CLIP

## (undated) DEVICE — AIRLIFE™ OXYGEN TUBING 7 FEET (2.1 M) CRUSH RESISTANT OXYGEN TUBING, VINYL TIPPED: Brand: AIRLIFE™

## (undated) DEVICE — DRAIN SURG L49IN DIA1/8IN 10IN H SIL W/O TRCR END PERF

## (undated) DEVICE — SYR 3ML LL TIP 1/10ML GRAD --

## (undated) DEVICE — TRAY PREP DRY W/ PREM GLV 2 APPL 6 SPNG 2 UNDPD 1 OVERWRAP

## (undated) DEVICE — SINGLE-USE BIOPSY FORCEPS: Brand: RADIAL JAW 4

## (undated) DEVICE — MOUTHPIECE ENDOSCP L CTRL OPN AND SIDE PORTS DISP

## (undated) DEVICE — SUTURE PERMAHAND SZ 3-0 L18IN NONABSORBABLE BLK SILK BRAID A184H

## (undated) DEVICE — SUTURE PERMAHAND SZ 2-0 L12X18IN NONABSORBABLE BLK SILK A185H

## (undated) DEVICE — ENDOSCOPIC KIT 1.1+ OP4 CA DE 2 GWN AAMI LEVEL 3

## (undated) DEVICE — SOLUTION IV 1000ML 0.9% SOD CHL

## (undated) DEVICE — BLOCK BITE AD 60FR W/ VELC STRP ADDRESSES MOST PT AND

## (undated) DEVICE — EMG TUBE 8229708 NIM TRIVANTAGE 8.0MM ID: Brand: NIM TRIVANTAGE™

## (undated) DEVICE — SUTURE VCRL SZ 3-0 L27IN ABSRB UD L26MM SH 1/2 CIR J416H